# Patient Record
Sex: FEMALE | Race: WHITE | NOT HISPANIC OR LATINO | Employment: OTHER | URBAN - METROPOLITAN AREA
[De-identification: names, ages, dates, MRNs, and addresses within clinical notes are randomized per-mention and may not be internally consistent; named-entity substitution may affect disease eponyms.]

---

## 2017-01-10 DIAGNOSIS — G47.00 INSOMNIA, UNSPECIFIED TYPE: ICD-10-CM

## 2017-01-11 RX ORDER — ZOLPIDEM TARTRATE 10 MG/1
TABLET ORAL
Qty: 30 TAB | Refills: 3 | Status: SHIPPED
Start: 2017-01-11 | End: 2017-01-12 | Stop reason: SDUPTHER

## 2017-01-12 DIAGNOSIS — G47.00 INSOMNIA, UNSPECIFIED TYPE: ICD-10-CM

## 2017-01-12 RX ORDER — ZOLPIDEM TARTRATE 10 MG/1
TABLET ORAL
Qty: 30 TAB | Refills: 3 | Status: SHIPPED | OUTPATIENT
Start: 2017-01-12 | End: 2017-04-13 | Stop reason: SDUPTHER

## 2017-01-12 NOTE — TELEPHONE ENCOUNTER
Caller Name: Isatu Dang                 Call Back Number: [phone] (home)         Patient approves a detailed voicemail message: N\A    Have we ever prescribed this med? Yes.  If yes, what date? 7/26/2016    Last OV: 7/26/2016 lui     Next OV: 3/13/2017 lui    DX: insomnia    Medications:  Current Outpatient Prescriptions   Medication Sig Dispense Refill   • Cyanocobalamin (VITAMIN B-12 SL) Place  under tongue every day.     • citalopram (CELEXA) 20 MG Tab TAKE ONE TABLET BY MOUTH ONCE DAILY 90 Tab 3   • zolpidem (AMBIEN) 10 MG Tab Take 1 Tab by mouth at bedtime as needed for Sleep. 30 Tab 3   • potassium chloride SA (K-DUR) 10 MEQ Tab CR Take 1 Tab by mouth every day. 90 Tab 3   • furosemide (LASIX) 40 MG Tab Take 1 Tab by mouth 2 Times a Day. 60 Tab 11   • carvedilol (COREG) 25 MG Tab Take 1 Tab by mouth 2 times a day, with meals. 180 Tab 3   • losartan (COZAAR) 50 MG Tab Take 1 Tab by mouth every day. 90 Tab 3   • metformin (GLUCOPHAGE) 1000 MG tablet Take 1,000 mg by mouth 2 times a day, with meals.     • digoxin (LANOXIN) 125 MCG Tab Take 1 Tab by mouth every day. 90 Tab 3   • aspirin (ASA) 325 MG TABS Take 325 mg by mouth every day.     • Multiple Vitamin (MULTI-VITAMINS) TABS Take 1 Tab by mouth every day.     • Omega-3 Fatty Acids (FISH OIL) 300 MG CAPS Take 1 Tab by mouth every day.     • Calcium Carbonate-Vitamin D (CALCIUM 600+D) 600-200 MG-UNIT TABS Take 2 Tabs by mouth every day.     • allopurinol (ZYLOPRIM) 300 MG TABS Take 300 mg by mouth 2 times a day.     • gabapentin (NEURONTIN) 300 MG CAPS Take 600 mg by mouth 3 times a day.       No current facility-administered medications for this visit.

## 2017-01-12 NOTE — TELEPHONE ENCOUNTER
Caller Name: Isatu Dang                 Call Back Number: [phone] (home)         Patient approves a detailed voicemail message: N\A    Have we ever prescribed this med? Yes.  If yes, what date? 1/11/17    Last OV: 11/4/16    Next OV: 3/13/17    DX: insomnia     Medications:  Current Outpatient Prescriptions   Medication Sig Dispense Refill   • zolpidem (AMBIEN) 10 MG Tab TAKE ONE TABLET BY MOUTH AT BEDTIME 30 Tab 3   • Cyanocobalamin (VITAMIN B-12 SL) Place  under tongue every day.     • citalopram (CELEXA) 20 MG Tab TAKE ONE TABLET BY MOUTH ONCE DAILY 90 Tab 3   • potassium chloride SA (K-DUR) 10 MEQ Tab CR Take 1 Tab by mouth every day. 90 Tab 3   • furosemide (LASIX) 40 MG Tab Take 1 Tab by mouth 2 Times a Day. 60 Tab 11   • carvedilol (COREG) 25 MG Tab Take 1 Tab by mouth 2 times a day, with meals. 180 Tab 3   • losartan (COZAAR) 50 MG Tab Take 1 Tab by mouth every day. 90 Tab 3   • metformin (GLUCOPHAGE) 1000 MG tablet Take 1,000 mg by mouth 2 times a day, with meals.     • digoxin (LANOXIN) 125 MCG Tab Take 1 Tab by mouth every day. 90 Tab 3   • aspirin (ASA) 325 MG TABS Take 325 mg by mouth every day.     • Multiple Vitamin (MULTI-VITAMINS) TABS Take 1 Tab by mouth every day.     • Omega-3 Fatty Acids (FISH OIL) 300 MG CAPS Take 1 Tab by mouth every day.     • Calcium Carbonate-Vitamin D (CALCIUM 600+D) 600-200 MG-UNIT TABS Take 2 Tabs by mouth every day.     • allopurinol (ZYLOPRIM) 300 MG TABS Take 300 mg by mouth 2 times a day.     • gabapentin (NEURONTIN) 300 MG CAPS Take 600 mg by mouth 3 times a day.       No current facility-administered medications for this visit.

## 2017-01-13 NOTE — TELEPHONE ENCOUNTER
[facility] [address] [place] [address] - [address] [place] [id number]  Phone: [phone] Fax: [phone]    Called rx into pharmacy.

## 2017-01-13 NOTE — TELEPHONE ENCOUNTER
Faxed Ambien to [facility] on [address]. Spoke with pt and let her no I received confirmation

## 2017-02-06 ENCOUNTER — HOSPITAL ENCOUNTER (OUTPATIENT)
Dept: LAB | Facility: MEDICAL CENTER | Age: 67
End: 2017-02-06
Attending: PSYCHIATRY & NEUROLOGY
Payer: MEDICARE

## 2017-02-06 ENCOUNTER — HOSPITAL ENCOUNTER (OUTPATIENT)
Dept: LAB | Facility: MEDICAL CENTER | Age: 67
End: 2017-02-06
Attending: INTERNAL MEDICINE
Payer: MEDICARE

## 2017-02-06 LAB
25(OH)D3 SERPL-MCNC: 46 NG/ML (ref 30–100)
ALBUMIN SERPL BCP-MCNC: 4.4 G/DL (ref 3.2–4.9)
ALBUMIN/GLOB SERPL: 1.8 G/DL
ALP SERPL-CCNC: 60 U/L (ref 30–99)
ALT SERPL-CCNC: 19 U/L (ref 2–50)
ANION GAP SERPL CALC-SCNC: 9 MMOL/L (ref 0–11.9)
AST SERPL-CCNC: 32 U/L (ref 12–45)
BASOPHILS # BLD AUTO: 0.09 K/UL (ref 0–0.12)
BASOPHILS NFR BLD AUTO: 1.1 % (ref 0–1.8)
BILIRUB SERPL-MCNC: 0.6 MG/DL (ref 0.1–1.5)
BUN SERPL-MCNC: 13 MG/DL (ref 8–22)
CALCIUM SERPL-MCNC: 9.7 MG/DL (ref 8.5–10.5)
CHLORIDE SERPL-SCNC: 101 MMOL/L (ref 96–112)
CO2 SERPL-SCNC: 32 MMOL/L (ref 20–33)
CREAT SERPL-MCNC: 0.59 MG/DL (ref 0.5–1.4)
EOSINOPHIL # BLD: 1.01 K/UL (ref 0–0.51)
EOSINOPHIL NFR BLD AUTO: 12.5 % (ref 0–6.9)
ERYTHROCYTE [DISTWIDTH] IN BLOOD BY AUTOMATED COUNT: 45.5 FL (ref 35.9–50)
FOLATE SERPL-MCNC: 19.9 NG/ML
GLOBULIN SER CALC-MCNC: 2.4 G/DL (ref 1.9–3.5)
GLUCOSE SERPL-MCNC: 130 MG/DL (ref 65–99)
HCT VFR BLD AUTO: 41.8 % (ref 37–47)
HGB BLD-MCNC: 14.5 G/DL (ref 12–16)
IMM GRANULOCYTES # BLD AUTO: 0.03 K/UL (ref 0–0.11)
IMM GRANULOCYTES NFR BLD AUTO: 0.4 % (ref 0–0.9)
LDH SERPL L TO P-CCNC: 168 U/L (ref 107–266)
LYMPHOCYTES # BLD: 1.58 K/UL (ref 1–4.8)
LYMPHOCYTES NFR BLD AUTO: 19.6 % (ref 22–41)
MCH RBC QN AUTO: 32.3 PG (ref 27–33)
MCHC RBC AUTO-ENTMCNC: 34.7 G/DL (ref 33.6–35)
MCV RBC AUTO: 93.1 FL (ref 81.4–97.8)
MONOCYTES # BLD: 0.49 K/UL (ref 0–0.85)
MONOCYTES NFR BLD AUTO: 6.1 % (ref 0–13.4)
NEUTROPHILS # BLD: 4.86 K/UL (ref 2–7.15)
NEUTROPHILS NFR BLD AUTO: 60.3 % (ref 44–72)
NRBC # BLD AUTO: 0 K/UL
NRBC BLD-RTO: 0 /100 WBC
PLATELET # BLD AUTO: 201 K/UL (ref 164–446)
PMV BLD AUTO: 10.7 FL (ref 9–12.9)
POTASSIUM SERPL-SCNC: 4.3 MMOL/L (ref 3.6–5.5)
PROT SERPL-MCNC: 6.8 G/DL (ref 6–8.2)
RBC # BLD AUTO: 4.49 M/UL (ref 4.2–5.4)
SODIUM SERPL-SCNC: 142 MMOL/L (ref 135–145)
T3 SERPL-MCNC: 100.5 NG/DL (ref 60–181)
T4 SERPL-MCNC: 9.1 UG/DL (ref 4–12)
TSH SERPL DL<=0.005 MIU/L-ACNC: 1.2 UIU/ML (ref 0.3–3.7)
VIT B12 SERPL-MCNC: 601 PG/ML (ref 211–911)
WBC # BLD AUTO: 8.1 K/UL (ref 4.8–10.8)

## 2017-02-06 PROCEDURE — 84443 ASSAY THYROID STIM HORMONE: CPT

## 2017-02-06 PROCEDURE — 84436 ASSAY OF TOTAL THYROXINE: CPT

## 2017-02-06 PROCEDURE — 84480 ASSAY TRIIODOTHYRONINE (T3): CPT

## 2017-02-06 PROCEDURE — 82306 VITAMIN D 25 HYDROXY: CPT

## 2017-02-06 PROCEDURE — 82746 ASSAY OF FOLIC ACID SERUM: CPT

## 2017-02-06 PROCEDURE — 82607 VITAMIN B-12: CPT

## 2017-02-27 DIAGNOSIS — I47.20 VT (VENTRICULAR TACHYCARDIA) (HCC): ICD-10-CM

## 2017-02-28 RX ORDER — DIGOXIN 125 MCG
TABLET ORAL
Qty: 90 TAB | Refills: 3 | Status: SHIPPED | OUTPATIENT
Start: 2017-02-28 | End: 2017-04-18 | Stop reason: SDUPTHER

## 2017-03-03 ENCOUNTER — NON-PROVIDER VISIT (OUTPATIENT)
Dept: PULMONOLOGY | Facility: HOSPICE | Age: 67
End: 2017-03-03
Payer: MEDICARE

## 2017-03-03 DIAGNOSIS — J98.4 RESTRICTIVE LUNG DISEASE: ICD-10-CM

## 2017-03-03 PROCEDURE — 94726 PLETHYSMOGRAPHY LUNG VOLUMES: CPT | Performed by: INTERNAL MEDICINE

## 2017-03-03 PROCEDURE — 94060 EVALUATION OF WHEEZING: CPT | Performed by: INTERNAL MEDICINE

## 2017-03-03 PROCEDURE — 94729 DIFFUSING CAPACITY: CPT | Performed by: INTERNAL MEDICINE

## 2017-03-03 ASSESSMENT — PULMONARY FUNCTION TESTS
FEV1_PERCENT_PREDICTED: 46
FVC_PERCENT_PREDICTED: 55
FEV1_PERCENT_PREDICTED: 50
FEV1_PREDICTED: 2.04
FVC_PREDICTED: 2.7
FVC: 1.45
FEV1/FVC_PERCENT_PREDICTED: 87
FEV1/FVC: 66
FEV1/FVC_PERCENT_PREDICTED: 91
FVC_PERCENT_PREDICTED: 53
FEV1: 1.04
FEV1: .96
FVC: 1.49
FEV1_PERCENT_CHANGE: 3
FEV1/FVC_PERCENT_PREDICTED: 76
FEV1/FVC: 69.8
FEV1/FVC_PERCENT_CHANGE: 267
FEV1_PERCENT_CHANGE: 8

## 2017-03-03 NOTE — PROCEDURES
Technician: Lg Gauthier, RRT/CPFT  Technician Comments:  Good patient effort & cooperation.  The results of this test meet the ATS/ERS standards for acceptability and repeatability.  Test was performed on the [employer] Body Plethysmograph- Elite DX system.  Predicted equations for Spirometry are NHanes, DLCO- [facility] & Lung volumes- [facility].  The DLCO was uncorrected for Hgb.  A bronchodilator of Ventolin HFA- 2puffs via spacer administered.  SPIROMETRY:  1. FVC was 1.45L, 53 % of predicted  2. FEV1 was 0.96L, 46 % of predicted   3. FEV1/FVC ratio was 69 %  4. There was no significant response to bronchodilators   5. Flow volume loop small and school but consistent with mixed ventilatory defect    LUNG VOLUMES:  1. TLC was 74 % of predicted   2. RV was  90 % of predicted     DIFFUSION CAPACITY:  1.Defusion capacity was  89 % of predicted       IMPRESSION:  The patient has severe decrease in FVC and FEV1 with a reduced FEV1/FVC ratio. Her total lung capacity was also mildly decreased. These findings are consistent with mixed restrictive and obstructive ventilatory defect. Clinical correlation is required.

## 2017-03-03 NOTE — MR AVS SNAPSHOT
Isatu Dang   3/3/2017 8:00 AM   Non-Provider Visit   MRN: [id number]    Department:  [facility]   Dept Phone:  [phone]    Description:  Female : 1950   Provider:  Catie Santana M.D.           Reason for Visit     Shortness of Breath           Allergies as of 3/3/2017     Allergen Noted Reactions    Ace Inhibitors 10/20/2011       cough    Tape 2011   Contact Dermatitis      You were diagnosed with     Restrictive lung disease   [627603]         Vital Signs     Smoking Status                   Former Smoker           Basic Information     Date Of Birth Sex Race Ethnicity Preferred Language    1950 Female White Non- English      Your appointments     Mar 13, 2017 10:00 AM   Follow UP with JEAN CLAUDE Coe   [facility] (--)    [address]  [place] [address]   [phone]              Problem List              ICD-10-CM Priority Class Noted - Resolved    Congestive heart failure (CHF) (CMS-HCC) I50.9   Unknown - Present    Depression F32.9   Unknown - Present    Type 2 diabetes mellitus without complication (CMS-HCC) E11.9   Unknown - Present    Dilated cardiomyopathy (CMS-HCC) I42.0   Unknown - Present    Hypercholesteremia E78.00   Unknown - Present    LBBB (left bundle branch block) I44.7   Unknown - Present    Obesity E66.9   Unknown - Present    Peripheral neuropathy (CMS-HCC) G62.9   Unknown - Present    Renal cyst, acquired, left N28.1   Unknown - Present    Ventricular tachycardia (CMS-HCC) I47.2   Unknown - Present    Presence of biventricular AICD Z95.810 Medium  2011 - Present    Lymphoma (CMS-HCC) C85.90   2012 - Present    Mediastinal mass J98.59   2012 - Present    Insomnia G47.00   2013 - Present    Obstructive sleep apnea G47.33 Low  2013 - Present    Lung cancer (CMS-HCC) C34.90   2013 - Present    Acute systolic heart failure (CMS-HCC) I50.21 High  2016 -  Present    Type 2 diabetes mellitus with diabetic polyneuropathy (CMS-HCC) E11.42 Low  1/31/2016 - Present    Essential hypertension I10 Medium  1/31/2016 - Present    Aortic regurgitation I35.1   5/27/2016 - Present    Nocturnal hypoxemia G47.34   7/26/2016 - Present    Restrictive lung disease J98.4   7/26/2016 - Present      Health Maintenance        Date Due Completion Dates    DIABETES MONOFILAMENT / LE EXAM 5/15/1949 ---    RETINAL SCREENING 11/15/1966 ---    IMM DTaP/Tdap/Td Vaccine (1 - Tdap) 11/15/1967 ---    PAP SMEAR 11/15/1969 ---    COLONOSCOPY 11/15/1998 ---    IMM ZOSTER VACCINE 11/15/2008 ---    IMM PNEUMOCOCCAL 65+ (ADULT) HIGH/HIGHEST RISK SERIES (1 of 2 - PCV13) 11/15/2013 ---    A1C SCREENING 7/18/2016 1/18/2016, 11/24/2015, 8/24/2015, 3/26/2014, 6/14/2011    IMM INFLUENZA (1) 9/1/2016 10/1/2015, 9/14/2011    FASTING LIPID PROFILE 1/18/2017 1/18/2016, 8/24/2015, 3/26/2014, 2/24/2012, 9/12/2011, 6/14/2011    URINE ACR / MICROALBUMIN 1/18/2017 1/18/2016, 11/24/2015    BONE DENSITY 3/5/2017 3/5/2012    MAMMOGRAM 10/26/2017 10/26/2016, 9/4/2015, 3/28/2014, 3/24/2014, 3/22/2013, 3/5/2012, 11/5/2010, 11/17/2008, 11/17/2008, 6/17/2008, 6/17/2008, 11/26/2007, 10/18/2007, 10/18/2007, 5/12/2006, 8/5/2004    SERUM CREATININE 2/6/2018 2/6/2017, 11/9/2016, 7/12/2016, 5/24/2016, 1/30/2016, 1/30/2016, 1/18/2016, 8/24/2015, 7/27/2015, 5/11/2015, 11/14/2014, 10/6/2014, 3/26/2014, 11/26/2013, 11/12/2013, 6/6/2013, 3/5/2013, 3/5/2013, 11/14/2012, 8/28/2012, 8/13/2012, 6/14/2012, 5/22/2012, 3/23/2012, 2/24/2012, 10/20/2011, 9/13/2011, 9/11/2011, 6/14/2011, 3/25/2008, 5/21/2007            Current Immunizations     Influenza TIV (IM) 10/1/2015, 9/14/2011 11:27 AM    Pneumococcal Vaccine (PCV7) Historical Data 10/1/2015    Pneumococcal Vaccine (UF)Historical Data 1/12/2009      Below and/or attached are the medications your provider expects you to take. Review all of your home medications and newly ordered medications  with your provider and/or pharmacist. Follow medication instructions as directed by your provider and/or pharmacist. Please keep your medication list with you and share with your provider. Update the information when medications are discontinued, doses are changed, or new medications (including over-the-counter products) are added; and carry medication information at all times in the event of emergency situations     Allergies:  ACE INHIBITORS - (reactions not documented)     TAPE - Contact Dermatitis               Medications  Valid as of: March 03, 2017 -  9:07 AM    Generic Name Brand Name Tablet Size Instructions for use    Allopurinol (Tab) ZYLOPRIM 300 MG Take 300 mg by mouth 2 times a day.        Aspirin (Tab)  MG Take 325 mg by mouth every day.        Calcium Carbonate-Vitamin D (Tab) Calcium Carbonate-Vitamin D 600-200 MG-UNIT Take 2 Tabs by mouth every day.        Carvedilol (Tab) COREG 25 MG Take 1 Tab by mouth 2 times a day, with meals.        Citalopram Hydrobromide (Tab) CELEXA 20 MG TAKE ONE TABLET BY MOUTH ONCE DAILY        Cyanocobalamin   Place  under tongue every day.        Digoxin (Tab) LANOXIN 125 MCG TAKE ONE TABLET BY MOUTH ONCE DAILY        Furosemide (Tab) LASIX 40 MG Take 1 Tab by mouth 2 Times a Day.        Gabapentin (Cap) NEURONTIN 300 MG Take 600 mg by mouth 3 times a day.        Losartan Potassium (Tab) COZAAR 50 MG Take 1 Tab by mouth every day.        MetFORMIN HCl (Tab) GLUCOPHAGE 1000 MG Take 1,000 mg by mouth 2 times a day, with meals.        Multiple Vitamin (Tab) MULTI-VITAMINS  Take 1 Tab by mouth every day.        Omega-3 Fatty Acids (Cap) Fish Oil 300 MG Take 1 Tab by mouth every day.        Potassium Chloride Crystal CR (Tab CR) K-DUR 10 MEQ Take 1 Tab by mouth every day.        Zolpidem Tartrate (Tab) AMBIEN 10 MG TAKE ONE TABLET BY MOUTH AT BEDTIME        .                 Medicines prescribed today were sent to:     [facility] - [place], [place] - [address]  [address] [address]    Phone: [phone] Fax: [phone]    Open 24 Hours?: No      Medication refill instructions:       If your prescription bottle indicates you have medication refills left, it is not necessary to call your provider’s office. Please contact your pharmacy and they will refill your medication.    If your prescription bottle indicates you do not have any refills left, you may request refills at any time through one of the following ways: The online [email or web address] system (except Urgent Care), by calling your provider’s office, or by asking your pharmacy to contact your provider’s office with a refill request. Medication refills are processed only during regular business hours and may not be available until the next business day. Your provider may request additional information or to have a follow-up visit with you prior to refilling your medication.   *Please Note: Medication refills are assigned a new Rx number when refilled electronically. Your pharmacy may indicate that no refills were authorized even though a new prescription for the same medication is available at the pharmacy. Please request the medicine by name with the pharmacy before contacting your provider for a refill.           [email or web address] Access Code: [email or web address]  Expires: 3/8/2017  7:37 AM    PresseTrends.com  A secure, online tool to manage your health information     [employer]’s PresseTrends.com® is a secure, online tool that connects you to your personalized health information from the privacy of your home -- day or night - making it very easy for you to manage your healthcare. Once the activation process is completed, you can even access your medical information using the PresseTrends.com cristobal, which is available for free in the Apple Cristobal store or Google Play store.     PresseTrends.com provides the following levels of access (as shown below):   My Chart Features   [facility] Primary Care Doctor [facility]  Specialists [facility]  Urgent  Care  [facility]  Primary [facility]  Doctor   Email your healthcare team securely and privately 24/7 X X X    Manage appointments: schedule your next appointment; view details of past/upcoming appointments X      Request prescription refills. X      View recent personal medical records, including lab and immunizations X X X X   View health record, including health history, allergies, medications X X X X   Read reports about your outpatient visits, procedures, consult and ER notes X X X X   See your discharge summary, which is a recap of your hospital and/or ER visit that includes your diagnosis, lab results, and care plan. X X       How to register for [employer]:  1. Go to  [email or web address].  2. Click on the Sign Up Now box, which takes you to the New Member Sign Up page. You will need to provide the following information:  a. Enter your [employer] Access Code exactly as it appears at the top of this page. (You will not need to use this code after you’ve completed the sign-up process. If you do not sign up before the expiration date, you must request a new code.)   b. Enter your date of birth.   c. Enter your home email address.   d. Click Submit, and follow the next screen’s instructions.  3. Create a Cinchcast ID. This will be your [employer] login ID and cannot be changed, so think of one that is secure and easy to remember.  4. Create a [employer] password. You can change your password at any time.  5. Enter your Password Reset Question and Answer. This can be used at a later time if you forget your password.   6. Enter your e-mail address. This allows you to receive e-mail notifications when new information is available in [employer].  7. Click Sign Up. You can now view your health information.    For assistance activating your [employer] account, call [phone]

## 2017-03-13 ENCOUNTER — SLEEP CENTER VISIT (OUTPATIENT)
Dept: SLEEP MEDICINE | Facility: MEDICAL CENTER | Age: 67
End: 2017-03-13
Payer: MEDICARE

## 2017-03-13 VITALS
BODY MASS INDEX: 30.55 KG/M2 | WEIGHT: 166 LBS | OXYGEN SATURATION: 94 % | SYSTOLIC BLOOD PRESSURE: 116 MMHG | TEMPERATURE: 97.6 F | HEIGHT: 62 IN | HEART RATE: 78 BPM | RESPIRATION RATE: 16 BRPM | DIASTOLIC BLOOD PRESSURE: 64 MMHG

## 2017-03-13 DIAGNOSIS — J98.59 MEDIASTINAL MASS: ICD-10-CM

## 2017-03-13 DIAGNOSIS — C82.80 OTHER TYPE OF FOLLICULAR LYMPHOMA: ICD-10-CM

## 2017-03-13 DIAGNOSIS — Z85.118 H/O: LUNG CANCER: ICD-10-CM

## 2017-03-13 DIAGNOSIS — G47.33 OBSTRUCTIVE SLEEP APNEA: ICD-10-CM

## 2017-03-13 DIAGNOSIS — J98.4 RESTRICTIVE LUNG DISEASE: ICD-10-CM

## 2017-03-13 DIAGNOSIS — G47.34 NOCTURNAL HYPOXEMIA: ICD-10-CM

## 2017-03-13 PROCEDURE — 99214 OFFICE O/P EST MOD 30 MIN: CPT | Performed by: NURSE PRACTITIONER

## 2017-03-13 RX ORDER — ALBUTEROL SULFATE 90 UG/1
2 AEROSOL, METERED RESPIRATORY (INHALATION) EVERY 4 HOURS PRN
Qty: 1 INHALER | Refills: 6 | Status: SHIPPED | OUTPATIENT
Start: 2017-03-13 | End: 2017-09-29

## 2017-03-13 NOTE — PATIENT INSTRUCTIONS
Plan:    1) Continue CPAP at 11 CM H20 with 2 LPM 02 bleed in.   2) Sleep hygiene discussed. Continue prn Ambien qhs.   3) Reviewed PFT's in detail. She has had a decline in her FEV1. Her DLCO has improved. She does not dyspnea with exertion. Trial of Spiriva 2.5 mcg respimat 2 puffs INH daily along with an Albuterol HFA inhaler 2 puffs q 4 hours as needed. Encouraged routine walking/exercise.   4) Continue to follow with Cardiology.  5) Continue to follow with Oncology. Request consult records from Dr. Rodríguez.   6) Scheduled repeat PFT's in 6 months give decline in FEV1.  7) 3 month follow up at the Pulmonary office, sooner if needed.

## 2017-03-13 NOTE — MR AVS SNAPSHOT
"Isatu Dang   3/13/2017 10:00 AM   Sleep Center Visit   MRN: [id number]    Department:  [facility]   Dept Phone:  [phone]    Description:  Female : 1950   Provider:  JEAN CLAUDE Coe           Reason for Visit     Apnea CPAP 11      Allergies as of 3/13/2017     Allergen Noted Reactions    Ace Inhibitors 10/20/2011       cough    Tape 2011   Contact Dermatitis      You were diagnosed with     Obstructive sleep apnea   [769217]       Nocturnal hypoxemia   [236142]       Mediastinal mass   [516729]       H/O: lung cancer   [484501]       Other type of follicular lymphoma (CMS-HCC)   [1418616]       Restrictive lung disease   [665053]         Vital Signs     Blood Pressure Pulse Temperature Respirations Height Weight    116/64 mmHg 78 36.4 °C (97.6 °F) 16 1.575 m (5' 2\") 75.297 kg (166 lb)    Body Mass Index Oxygen Saturation Smoking Status             30.35 kg/m2 94% Former Smoker         Basic Information     Date Of Birth Sex Race Ethnicity Preferred Language    1950 Female White Non- English      Your appointments     2017  9:00 AM   Follow UP with JEAN CLAUDE Coe   [facility] (--)    [address] [facility]  [place] [id number] [phone]              Problem List              ICD-10-CM Priority Class Noted - Resolved    Congestive heart failure (CHF) (CMS-HCC) I50.9   Unknown - Present    Depression F32.9   Unknown - Present    Type 2 diabetes mellitus without complication (CMS-HCC) E11.9   Unknown - Present    Dilated cardiomyopathy (CMS-HCC) I42.0   Unknown - Present    Hypercholesteremia E78.00   Unknown - Present    LBBB (left bundle branch block) I44.7   Unknown - Present    Obesity E66.9   Unknown - Present    Peripheral neuropathy (CMS-HCC) G62.9   Unknown - Present    Renal cyst, acquired, left N28.1   Unknown - Present    Ventricular tachycardia (CMS-HCC) I47.2   Unknown - Present   " Presence of biventricular AICD Z95.810 Medium  12/20/2011 - Present    Lymphoma (CMS-HCC) C85.90   8/17/2012 - Present    Mediastinal mass J98.59   8/17/2012 - Present    Insomnia G47.00   4/17/2013 - Present    Obstructive sleep apnea G47.33 Low  4/17/2013 - Present    Lung cancer (CMS-HCC) C34.90   7/16/2013 - Present    Acute systolic heart failure (CMS-HCC) I50.21 High  1/30/2016 - Present    Type 2 diabetes mellitus with diabetic polyneuropathy (CMS-HCC) E11.42 Low  1/31/2016 - Present    Essential hypertension I10 Medium  1/31/2016 - Present    Aortic regurgitation I35.1   5/27/2016 - Present    Nocturnal hypoxemia G47.34   7/26/2016 - Present    Restrictive lung disease J98.4   7/26/2016 - Present      Health Maintenance        Date Due Completion Dates    DIABETES MONOFILAMENT / LE EXAM 5/15/1949 ---    RETINAL SCREENING 11/15/1966 ---    IMM DTaP/Tdap/Td Vaccine (1 - Tdap) 11/15/1967 ---    PAP SMEAR 11/15/1969 ---    COLONOSCOPY 11/15/1998 ---    IMM ZOSTER VACCINE 11/15/2008 ---    IMM PNEUMOCOCCAL 65+ (ADULT) HIGH/HIGHEST RISK SERIES (1 of 2 - PCV13) 11/15/2013 ---    A1C SCREENING 7/18/2016 1/18/2016, 11/24/2015, 8/24/2015, 3/26/2014, 6/14/2011    IMM INFLUENZA (1) 9/1/2016 10/1/2015, 9/14/2011    FASTING LIPID PROFILE 1/18/2017 1/18/2016, 8/24/2015, 3/26/2014, 2/24/2012, 9/12/2011, 6/14/2011    URINE ACR / MICROALBUMIN 1/18/2017 1/18/2016, 11/24/2015    BONE DENSITY 3/5/2017 3/5/2012    MAMMOGRAM 10/26/2017 10/26/2016, 9/4/2015, 3/28/2014, 3/24/2014, 3/22/2013, 3/5/2012, 11/5/2010, 11/17/2008, 11/17/2008, 6/17/2008, 6/17/2008, 11/26/2007, 10/18/2007, 10/18/2007, 5/12/2006, 8/5/2004    SERUM CREATININE 2/6/2018 2/6/2017, 11/9/2016, 7/12/2016, 5/24/2016, 1/30/2016, 1/30/2016, 1/18/2016, 8/24/2015, 7/27/2015, 5/11/2015, 11/14/2014, 10/6/2014, 3/26/2014, 11/26/2013, 11/12/2013, 6/6/2013, 3/5/2013, 3/5/2013, 11/14/2012, 8/28/2012, 8/13/2012, 6/14/2012, 5/22/2012, 3/23/2012, 2/24/2012, 10/20/2011, 9/13/2011,  9/11/2011, 6/14/2011, 3/25/2008, 5/21/2007            Current Immunizations     Influenza TIV (IM) 10/1/2015, 9/14/2011 11:27 AM    Pneumococcal Vaccine (PCV7) Historical Data 10/1/2015    Pneumococcal Vaccine (UF)Historical Data 1/12/2009      Below and/or attached are the medications your provider expects you to take. Review all of your home medications and newly ordered medications with your provider and/or pharmacist. Follow medication instructions as directed by your provider and/or pharmacist. Please keep your medication list with you and share with your provider. Update the information when medications are discontinued, doses are changed, or new medications (including over-the-counter products) are added; and carry medication information at all times in the event of emergency situations     Allergies:  ACE INHIBITORS - (reactions not documented)     TAPE - Contact Dermatitis               Medications  Valid as of: March 13, 2017 - 10:56 AM    Generic Name Brand Name Tablet Size Instructions for use    Albuterol Sulfate (Aero Soln) albuterol 108 (90 BASE) MCG/ACT Inhale 2 Puffs by mouth every four hours as needed for Shortness of Breath (Wheezing).        Allopurinol (Tab) ZYLOPRIM 300 MG Take 300 mg by mouth 2 times a day.        Aspirin (Tab)  MG Take 325 mg by mouth every day.        Calcium Carbonate-Vitamin D (Tab) Calcium Carbonate-Vitamin D 600-200 MG-UNIT Take 2 Tabs by mouth every day.        Carvedilol (Tab) COREG 25 MG Take 1 Tab by mouth 2 times a day, with meals.        Citalopram Hydrobromide (Tab) CELEXA 20 MG TAKE ONE TABLET BY MOUTH ONCE DAILY        Cyanocobalamin   Place  under tongue every day.        Digoxin (Tab) LANOXIN 125 MCG TAKE ONE TABLET BY MOUTH ONCE DAILY        Furosemide (Tab) LASIX 40 MG Take 1 Tab by mouth 2 Times a Day.        Gabapentin (Cap) NEURONTIN 300 MG Take 600 mg by mouth 3 times a day.        Losartan Potassium (Tab) COZAAR 50 MG Take 1 Tab by mouth every  day.        MetFORMIN HCl (Tab) GLUCOPHAGE 1000 MG Take 1,000 mg by mouth 2 times a day, with meals.        Multiple Vitamin (Tab) MULTI-VITAMINS  Take 1 Tab by mouth every day.        Omega-3 Fatty Acids (Cap) Fish Oil 300 MG Take 1 Tab by mouth every day.        Potassium Chloride Crystal CR (Tab CR) K-DUR 10 MEQ Take 1 Tab by mouth every day.        Tiotropium Bromide Monohydrate (Aero Soln) Tiotropium Bromide Monohydrate 2.5 MCG/ACT Inhale 2 Inhalation by mouth every day. Assemble and prime.        Zolpidem Tartrate (Tab) AMBIEN 10 MG TAKE ONE TABLET BY MOUTH AT BEDTIME        .                 Medicines prescribed today were sent to:     [facility] [address] [place] [address] [place] - [address] [place] [address]    Phone: [phone] Fax: [phone]    Open 24 Hours?: No      Medication refill instructions:       If your prescription bottle indicates you have medication refills left, it is not necessary to call your provider’s office. Please contact your pharmacy and they will refill your medication.    If your prescription bottle indicates you do not have any refills left, you may request refills at any time through one of the following ways: The online Triggit system (except Urgent Care), by calling your provider’s office, or by asking your pharmacy to contact your provider’s office with a refill request. Medication refills are processed only during regular business hours and may not be available until the next business day. Your provider may request additional information or to have a follow-up visit with you prior to refilling your medication.   *Please Note: Medication refills are assigned a new Rx number when refilled electronically. Your pharmacy may indicate that no refills were authorized even though a new prescription for the same medication is available at the pharmacy. Please request the medicine by name with the pharmacy before contacting your provider for a refill.           Your To Do List     Future Labs/Procedures Complete By Expires    AMB PULMONARY FUNCTION TEST/LAB  9/13/2017 (Approximate) 3/13/2018    Comments:    In 6 months      Instructions    Plan:    1) Continue CPAP at 11 CM H20 with 2 LPM 02 bleed in.   2) Sleep hygiene discussed. Continue prn Ambien qhs.   3) Reviewed PFT's in detail. She has had a decline in her FEV1. Her DLCO has improved. She does not dyspnea with exertion. Trial of Spiriva 2.5 mcg respimat 2 puffs INH daily along with an Albuterol HFA inhaler 2 puffs q 4 hours as needed. Encouraged routine walking/exercise.   4) Continue to follow with Cardiology.  5) Continue to follow with Oncology. Request consult records from Dr. Rodríguez.   6) Scheduled repeat PFT's in 6 months give decline in FEV1.  7) 3 month follow up at the Pulmonary office, sooner if needed.           FlexGen Access Code: [email or web address]  Expires: 4/12/2017 10:56 AM    FlexGen  A secure, online tool to manage your health information     [employer]’s FlexGen® is a secure, online tool that connects you to your personalized health information from the privacy of your home -- day or night - making it very easy for you to manage your healthcare. Once the activation process is completed, you can even access your medical information using the FlexGen cristobal, which is available for free in the Apple Cristobal store or Google Play store.     FlexGen provides the following levels of access (as shown below):   My Chart Features   [facility] Doctor [facility]  Specialists [facility]  Urgent  Care Non-[facility]  Primary Care  Doctor   Email your healthcare team securely and privately 24/7 X X X    Manage appointments: schedule your next appointment; view details of past/upcoming appointments X      Request prescription refills. X      View recent personal medical records, including lab and immunizations X X X X   View health record, including health history, allergies, medications X X X X   Read reports about  your outpatient visits, procedures, consult and ER notes X X X X   See your discharge summary, which is a recap of your hospital and/or ER visit that includes your diagnosis, lab results, and care plan. X X       How to register for etaskr:  1. Go to  [email or web address].  2. Click on the Sign Up Now box, which takes you to the New Member Sign Up page. You will need to provide the following information:  a. Enter your etaskr Access Code exactly as it appears at the top of this page. (You will not need to use this code after you’ve completed the sign-up process. If you do not sign up before the expiration date, you must request a new code.)   b. Enter your date of birth.   c. Enter your home email address.   d. Click Submit, and follow the next screen’s instructions.  3. Create a etaskr ID. This will be your etaskr login ID and cannot be changed, so think of one that is secure and easy to remember.  4. Create a etaskr password. You can change your password at any time.  5. Enter your Password Reset Question and Answer. This can be used at a later time if you forget your password.   6. Enter your e-mail address. This allows you to receive e-mail notifications when new information is available in etaskr.  7. Click Sign Up. You can now view your health information.    For assistance activating your etaskr account, call [phone]

## 2017-03-13 NOTE — PROGRESS NOTES
Chief Complaint   Patient presents with   • Apnea     CPAP 11       HPI:  Isatu Dang is a 66 y.o. year old female here today for follow-up on her Mild ISMAEL. She is compliant on CPAP at 11 CM H20 with 2 LPM 02 bleed in. Her compliance card download indicates an AHI of 2.3 on a CPAP pressure of 11 CM with an average use of 7-8 hours at night. She tolerates her CPAP pressure well. She uses Ambien at night as needed and feels this helps her to maintain sleep. She denies any morning headaches. She does feel more refreshed on therapy.   She has a history of Follicular Lymphoma and Lung Cancer with a prior lung resection in 2012. She is followed by Oncology, Dr. Rodríguez. She had PFT's while she was in the hospital February 2016 for an exacerbation of her CHF. PFT's indicated evidence of restriction with TLC 75% which was similar to her 2013 PFT's. However, her DLCO was 64% predicted and in 2013 it was 90% predicted.   PFT's were updated 3/3/2017 and indicate an FEV1 of 0.96 L, 46% predicted with an FEV1/FVC ratio of 66 with a TLC of 74% predicted and a DLCO of 89% predicted. Her FEV1 has declined from her February 2016 PFT's in which her FEV1 was 1.32 L, 63% predicted.   She notes dyspnea with exertion. She states she has not been walking as much as she had in the past. She denies any cough or mucous. She denies any wheezing. She denies any fevers or chills.     CT chest, abdomen and pelvis 8/4/2016;  1.  Focal opacities in the right middle lobe have resolved when compared to previous exam. Small bilateral pleural-based nodules are again appreciated. The nodule in the posterior left lower lobe appears broader and flatter than it did on the previous   examination. This changes of uncertain significance.  2.  Slight increase in size of heterogeneous enhancing anterior superior mediastinal mass with current measurements of 3.2 x 3.6 cm.  3.  Hepatomegaly with fatty infiltration.  4.  Interval resolution of small  bilateral pleural effusions.      Past Medical History   Diagnosis Date   • Joint replacement          • Neuropathy    • Dilated cardiomyopathy    • Hypercholesteremia    • Obesity    • Peripheral neuropathy (CMS-HCC)    • Depression    • Unspecified hemorrhagic conditions (CMS-HCC)      from aspirin   • Renal cyst, acquired, left       History of benign cystic mass on the lower left kidney.   • Hypertension 1998     Pt states bp runs low on current meds   • Myocardial infarct (CMS-[facility]) 1998   • Congestive heart failure (CHF) (CMS-HCC)    • Arrhythmia    • Cardiac arrest (CMS-HCC) 1998         • LBBB (left bundle branch block)    • Ventricular tachycardia (CMS-[facility])    • Pain 2011     Legs pain controled on meds   • Indigestion    • Sleep apnea    • Lung cancer (CMS-HCC) 2012         • Cancer (CMS-HCC) 2012     Lymphoma to neck   • CATARACT      Beginning stages no surgery yet   • Diabetes (CMS-HCC)    • Bowel habit changes      Diarrhea.   • Dental disorder      Upper/Lower dentures.       Past Surgical History   Procedure Laterality Date   • Hysterectomy, total abdominal  1995   • Implantable cardioverter defibrillator (icd)  4/1/98, 03/2007, 10/2011   • Carpal tunnel release Right 05/07         • Carpal tunnel release  4/3/08     Performed by DEEP GREEN at SURGERY [facility] ORS   • Lipoma excision  4/3/08     Performed by DEEP GREEN at SURGERY [facility] ORS   • Recovery  10/21/2011     Performed by SURGERY, CATH-RECOVERY at SURGERY SAME DAY [facility] ORS   • Hysterectomy, total abdominal           • Tonsillectomy and adenoidectomy           • Neck mass excision  5/24/2012     Performed by LAURA RAMIRES at SURGERY SAME DAY [facility] ORS   • Bone marrow aspiration  6/22/2012     Performed by ISAIAS EVERETT at ENDOSCOPY [facility] ORS   • Bone marrow biopsy, ndl/trocar  6/22/2012     Performed by ISAIAS EVERETT at ENDOSCOPY [facility] ORS   • Recovery  7/23/2012     Performed by SURGERY,  IR-RECOVERY at SURGERY SAME DAY [facility] ORS   • Thoracoscopy robotic  8/27/2012     Performed by GANSER, JOHN H at SURGERY [facility] ORS   • Laryngoscopy  11/14/2013     Performed by Judson Willis M.D. at SURGERY SAME DAY [facility] ORS   • Biopsy general  11/14/2013     Performed by Judson Willis M.D. at SURGERY SAME DAY [facility] ORS   • Aicd battery change  November 2016     Generator replacement with Medtronic Viva S CRT-D [id number] implanted by Dr. Alexis. Original device implanted in 1998       Family History   Problem Relation Age of Onset   • Diabetes Mother    • Cancer Mother    • Diabetes Maternal Grandmother    • Diabetes Sister        Social History     Social History   • Marital Status:      Spouse Name: N/A   • Number of Children: N/A   • Years of Education: N/A     Occupational History   • Not on file.     Social History Main Topics   • Smoking status: Former Smoker -- 2.00 packs/day for 30 years     Types: Cigarettes     Quit date: 10/20/1998   • Smokeless tobacco: Never Used   • Alcohol Use: Yes      Comment: occasional    • Drug Use: No   • Sexual Activity: Not Currently     Other Topics Concern   • Not on file     Social History Narrative         ROS:  Constitutional: Denies fevers, chills, sweats, fatigue, weight loss  Eyes: Denies glasses, vision loss, pain, drainage, double vision  Ears/Nose/Mouth/Throat: Denies rhinitis, nasal congestion, ear ache, difficulty hearing, sore throat, persistent hoarseness, decayed teeth/toothache  Cardiovascular: Denies chest pain, tightness, palpitations, swelling in feet/legs, fainting, difficulty breathing when laying down  Respiratory: See HPI   GI: Denies heartburn, difficulty swallowing, nausea, vomiting, abdominal pain, diarrhea, constipation  : Denies frequent urination, painful urination  Integumentary: Denies rashes, lumps or color changes  MSK: Denies painful joints, sore muscles, and back pain.   Neurological: Denies frequent headaches,  "dizziness, weakness  Sleep: See [facility]       Current Outpatient Prescriptions on File Prior to Visit   Medication Sig Dispense Refill   • digoxin (LANOXIN) 125 MCG Tab TAKE ONE TABLET BY MOUTH ONCE DAILY 90 Tab 3   • zolpidem (AMBIEN) 10 MG Tab TAKE ONE TABLET BY MOUTH AT BEDTIME 30 Tab 3   • Cyanocobalamin (VITAMIN B-12 SL) Place  under tongue every day.     • citalopram (CELEXA) 20 MG Tab TAKE ONE TABLET BY MOUTH ONCE DAILY 90 Tab 3   • potassium chloride SA (K-DUR) 10 MEQ Tab CR Take 1 Tab by mouth every day. 90 Tab 3   • furosemide (LASIX) 40 MG Tab Take 1 Tab by mouth 2 Times a Day. 60 Tab 11   • carvedilol (COREG) 25 MG Tab Take 1 Tab by mouth 2 times a day, with meals. 180 Tab 3   • losartan (COZAAR) 50 MG Tab Take 1 Tab by mouth every day. 90 Tab 3   • metformin (GLUCOPHAGE) 1000 MG tablet Take 1,000 mg by mouth 2 times a day, with meals.     • aspirin (ASA) 325 MG TABS Take 325 mg by mouth every day.     • Multiple Vitamin (MULTI-VITAMINS) TABS Take 1 Tab by mouth every day.     • Calcium Carbonate-Vitamin D (CALCIUM 600+D) 600-200 MG-UNIT TABS Take 2 Tabs by mouth every day.     • allopurinol (ZYLOPRIM) 300 MG TABS Take 300 mg by mouth 2 times a day.     • gabapentin (NEURONTIN) 300 MG CAPS Take 600 mg by mouth 3 times a day.     • Omega-3 Fatty Acids (FISH OIL) 300 MG CAPS Take 1 Tab by mouth every day.       No current facility-administered medications on file prior to visit.     Ace inhibitors and Tape    Blood pressure 116/64, pulse 78, temperature 36.4 °C (97.6 °F), resp. rate 16, height 1.575 m (5' 2\"), weight 75.297 kg (166 lb), SpO2 94 %.  PE:   Appearance: Well developed, well nourished, no acute distress  Eyes: PERRL, EOM intact, sclera white, conjunctiva moist  Ears: no lesions or deformities  Hearing: grossly intact  Nose: no lesions or deformities  Oropharynx: tongue normal, posterior pharynx without erythema or exudate  Mallampati Classification: Class 4   Neck: supple, trachea midline, no " masses   Respiratory effort: no intercostal retractions or use of accessory muscles  Lung auscultation: no rales, rhonchi or wheezes  Heart auscultation: no murmur rub or gallop  Extremities: no cyanosis or edema  Abdomen: soft ,non tender, no masses  Gait and Station: normal  Digits and nails: no clubbing, cyanosis, petechiae or nodes.  Cranial nerves: grossly intact  Skin: no rashes, lesions or ulcers noted  Orientation: Oriented to time, person and place  Mood and affect: mood and affect appropriate, normal interaction with examiner  Judgement: Intact          Assessment:  1. Obstructive sleep apnea     2. Nocturnal hypoxemia     3. Mediastinal mass     4. H/O: lung cancer     5. Other type of follicular lymphoma (HCC)     6. Restrictive lung disease           Plan:    1) Continue CPAP at 11 CM H20 with 2 LPM 02 bleed in.   2) Sleep hygiene discussed. Continue prn Ambien qhs.   3) Reviewed PFT's in detail. She has had a decline in her FEV1. Her DLCO has improved. She does not dyspnea with exertion. Trial of Spiriva 2.5 mcg respimat 2 puffs INH daily along with an Albuterol HFA inhaler 2 puffs q 4 hours as needed. Encouraged routine walking/exercise.   4) Continue to follow with Cardiology.  5) Continue to follow with Oncology. Request consult records from Dr. Rodríguez.   6) Scheduled repeat PFT's in 6 months give decline in FEV1.  7) 3 month follow up at the Pulmonary office, sooner if needed.

## 2017-04-05 DIAGNOSIS — I10 ESSENTIAL HYPERTENSION, BENIGN: ICD-10-CM

## 2017-04-05 RX ORDER — FUROSEMIDE 40 MG/1
40 TABLET ORAL 2 TIMES DAILY
Qty: 60 TAB | Refills: 11 | Status: SHIPPED | OUTPATIENT
Start: 2017-04-05 | End: 2017-04-18 | Stop reason: SDUPTHER

## 2017-04-13 ENCOUNTER — TELEPHONE (OUTPATIENT)
Dept: PULMONOLOGY | Facility: HOSPICE | Age: 67
End: 2017-04-13

## 2017-04-13 DIAGNOSIS — G47.00 INSOMNIA, UNSPECIFIED TYPE: ICD-10-CM

## 2017-04-13 DIAGNOSIS — J98.4 RESTRICTIVE LUNG DISEASE: ICD-10-CM

## 2017-04-13 RX ORDER — ZOLPIDEM TARTRATE 10 MG/1
TABLET ORAL
Qty: 90 TAB | Refills: 1 | Status: SHIPPED
Start: 2017-04-13 | End: 2017-07-13 | Stop reason: SDUPTHER

## 2017-04-13 NOTE — TELEPHONE ENCOUNTER
Have we ever prescribed this med? Yes.  If yes, what date? 1/12/17    Last OV: 3/13/17    Next OV: 7/19/17    DX: insomnia     Medications:  Current Outpatient Prescriptions   Medication Sig Dispense Refill   • furosemide (LASIX) 40 MG Tab Take 1 Tab by mouth 2 Times a Day. 60 Tab 11   • Tiotropium Bromide Monohydrate (SPIRIVA RESPIMAT) 2.5 MCG/ACT Aero Soln Inhale 2 Inhalation by mouth every day. Assemble and prime. 1 Inhaler 6   • albuterol (VENTOLIN HFA) 108 (90 BASE) MCG/ACT Aero Soln inhalation aerosol Inhale 2 Puffs by mouth every four hours as needed for Shortness of Breath (Wheezing). 1 Inhaler 6   • digoxin (LANOXIN) 125 MCG Tab TAKE ONE TABLET BY MOUTH ONCE DAILY 90 Tab 3   • zolpidem (AMBIEN) 10 MG Tab TAKE ONE TABLET BY MOUTH AT BEDTIME 30 Tab 3   • Cyanocobalamin (VITAMIN B-12 SL) Place  under tongue every day.     • citalopram (CELEXA) 20 MG Tab TAKE ONE TABLET BY MOUTH ONCE DAILY 90 Tab 3   • potassium chloride SA (K-DUR) 10 MEQ Tab CR Take 1 Tab by mouth every day. 90 Tab 3   • carvedilol (COREG) 25 MG Tab Take 1 Tab by mouth 2 times a day, with meals. 180 Tab 3   • losartan (COZAAR) 50 MG Tab Take 1 Tab by mouth every day. 90 Tab 3   • metformin (GLUCOPHAGE) 1000 MG tablet Take 1,000 mg by mouth 2 times a day, with meals.     • aspirin (ASA) 325 MG TABS Take 325 mg by mouth every day.     • Multiple Vitamin (MULTI-VITAMINS) TABS Take 1 Tab by mouth every day.     • Omega-3 Fatty Acids (FISH OIL) 300 MG CAPS Take 1 Tab by mouth every day.     • Calcium Carbonate-Vitamin D (CALCIUM 600+D) 600-200 MG-UNIT TABS Take 2 Tabs by mouth every day.     • allopurinol (ZYLOPRIM) 300 MG TABS Take 300 mg by mouth 2 times a day.     • gabapentin (NEURONTIN) 300 MG CAPS Take 600 mg by mouth 3 times a day.       No current facility-administered medications for this visit.

## 2017-04-13 NOTE — TELEPHONE ENCOUNTER
Pt also needs an RX for Spiriva.     Have we ever prescribed this med? Yes.  If yes, what date? 03/13/17    Last OV: 03/13/17    Next OV: 07/19/17    DX: RAFFI    Medications: Spiriva Respimat

## 2017-04-13 NOTE — TELEPHONE ENCOUNTER
[facility] is requesting that a new rx for Spiriva Respimat to be sent to pharmacy, called over to [facility] pharmacist Jose stated that the pt had 6 refills on file so called those into the [facility] 90/3.

## 2017-04-13 NOTE — TELEPHONE ENCOUNTER
Prescription faxed to:      [facility] - [address]  [address]  [facility] [address]  Phone: [phone] Fax: [phone]      Marcelino smith/darcie

## 2017-04-18 DIAGNOSIS — I50.30 DIASTOLIC CONGESTIVE HEART FAILURE, UNSPECIFIED CONGESTIVE HEART FAILURE CHRONICITY: ICD-10-CM

## 2017-04-18 DIAGNOSIS — I10 ESSENTIAL HYPERTENSION, BENIGN: ICD-10-CM

## 2017-04-18 DIAGNOSIS — I47.20 VT (VENTRICULAR TACHYCARDIA) (HCC): ICD-10-CM

## 2017-04-18 RX ORDER — LOSARTAN POTASSIUM 50 MG/1
50 TABLET ORAL DAILY
Qty: 90 TAB | Refills: 3 | Status: SHIPPED | OUTPATIENT
Start: 2017-04-18 | End: 2017-09-14

## 2017-04-18 RX ORDER — POTASSIUM CHLORIDE 750 MG/1
10 TABLET, EXTENDED RELEASE ORAL DAILY
Qty: 90 TAB | Refills: 3 | Status: ON HOLD | OUTPATIENT
Start: 2017-04-18 | End: 2017-11-10

## 2017-04-18 RX ORDER — FUROSEMIDE 40 MG/1
40 TABLET ORAL 2 TIMES DAILY
Qty: 180 TAB | Refills: 3 | Status: SHIPPED | OUTPATIENT
Start: 2017-04-18 | End: 2017-11-05

## 2017-04-18 RX ORDER — DIGOXIN 125 MCG
125 TABLET ORAL DAILY
Qty: 90 TAB | Refills: 3 | Status: SHIPPED | OUTPATIENT
Start: 2017-04-18 | End: 2017-11-30 | Stop reason: SDUPTHER

## 2017-04-18 RX ORDER — CARVEDILOL 25 MG/1
25 TABLET ORAL 2 TIMES DAILY WITH MEALS
Qty: 180 TAB | Refills: 3 | Status: ON HOLD | OUTPATIENT
Start: 2017-04-18 | End: 2017-11-10

## 2017-05-02 ENCOUNTER — NON-PROVIDER VISIT (OUTPATIENT)
Dept: CARDIOLOGY | Facility: MEDICAL CENTER | Age: 67
End: 2017-05-02
Payer: MEDICARE

## 2017-05-02 ENCOUNTER — OFFICE VISIT (OUTPATIENT)
Dept: CARDIOLOGY | Facility: MEDICAL CENTER | Age: 67
End: 2017-05-02
Payer: MEDICARE

## 2017-05-02 VITALS
DIASTOLIC BLOOD PRESSURE: 72 MMHG | SYSTOLIC BLOOD PRESSURE: 132 MMHG | WEIGHT: 167 LBS | HEIGHT: 60 IN | BODY MASS INDEX: 32.79 KG/M2 | HEART RATE: 88 BPM | OXYGEN SATURATION: 99 %

## 2017-05-02 DIAGNOSIS — I47.20 VENTRICULAR TACHYCARDIA (HCC): ICD-10-CM

## 2017-05-02 DIAGNOSIS — C34.00 MALIGNANT NEOPLASM OF HILUS OF LUNG, UNSPECIFIED LATERALITY (HCC): ICD-10-CM

## 2017-05-02 DIAGNOSIS — Z95.810 PRESENCE OF BIVENTRICULAR AICD: ICD-10-CM

## 2017-05-02 DIAGNOSIS — I44.7 LBBB (LEFT BUNDLE BRANCH BLOCK): ICD-10-CM

## 2017-05-02 DIAGNOSIS — I42.0 DILATED CARDIOMYOPATHY (HCC): ICD-10-CM

## 2017-05-02 DIAGNOSIS — I50.20 CHF (CONGESTIVE HEART FAILURE), NYHA CLASS III, UNSPECIFIED FAILURE CHRONICITY, SYSTOLIC (HCC): ICD-10-CM

## 2017-05-02 DIAGNOSIS — I50.9 CONGESTIVE HEART FAILURE, UNSPECIFIED CONGESTIVE HEART FAILURE CHRONICITY, UNSPECIFIED CONGESTIVE HEART FAILURE TYPE: ICD-10-CM

## 2017-05-02 DIAGNOSIS — C82.80 OTHER TYPE OF FOLLICULAR LYMPHOMA, UNSPECIFIED BODY REGION (HCC): ICD-10-CM

## 2017-05-02 DIAGNOSIS — Z95.810 PRESENCE OF BIVENTRICULAR AUTOMATIC CARDIOVERTER/DEFIBRILLATOR (AICD): ICD-10-CM

## 2017-05-02 PROCEDURE — 4040F PNEUMOC VAC/ADMIN/RCVD: CPT | Mod: 8P | Performed by: INTERNAL MEDICINE

## 2017-05-02 PROCEDURE — G8432 DEP SCR NOT DOC, RNG: HCPCS | Performed by: INTERNAL MEDICINE

## 2017-05-02 PROCEDURE — 99214 OFFICE O/P EST MOD 30 MIN: CPT | Performed by: INTERNAL MEDICINE

## 2017-05-02 PROCEDURE — 1036F TOBACCO NON-USER: CPT | Performed by: INTERNAL MEDICINE

## 2017-05-02 PROCEDURE — 93284 PRGRMG EVAL IMPLANTABLE DFB: CPT | Performed by: INTERNAL MEDICINE

## 2017-05-02 PROCEDURE — 3017F COLORECTAL CA SCREEN DOC REV: CPT | Mod: 8P | Performed by: INTERNAL MEDICINE

## 2017-05-02 PROCEDURE — G8417 CALC BMI ABV UP PARAM F/U: HCPCS | Performed by: INTERNAL MEDICINE

## 2017-05-02 PROCEDURE — 1101F PT FALLS ASSESS-DOCD LE1/YR: CPT | Mod: 8P | Performed by: INTERNAL MEDICINE

## 2017-05-02 PROCEDURE — 3014F SCREEN MAMMO DOC REV: CPT | Performed by: INTERNAL MEDICINE

## 2017-05-02 RX ORDER — ALBUTEROL SULFATE 90 UG/1
2 AEROSOL, METERED RESPIRATORY (INHALATION) EVERY 6 HOURS PRN
COMMUNITY
End: 2017-05-12 | Stop reason: SDUPTHER

## 2017-05-02 NOTE — PROGRESS NOTES
Subjective:   Isatu Dang is a 66 y.o. female who presents today with NICM and LBBB with BIV AICD. Mild increase in SOB and fatigue. Not hospitalize. Thoracic imp now ok. Mo chest pain. Low grade non hodgkin's lymphoma. Previous [place] cancer.     Past Medical History   Diagnosis Date   • Joint replacement          • Neuropathy    • Dilated cardiomyopathy    • Hypercholesteremia    • Obesity    • Peripheral neuropathy    • Depression    • Unspecified hemorrhagic conditions      from aspirin   • Renal cyst, acquired, left       History of benign cystic mass on the lower left kidney.   • Hypertension 1998     Pt states bp runs low on current meds   • Myocardial infarct (CMS-[facility]) 1998   • Congestive heart failure (CHF) (CMS-[facility])    • Arrhythmia    • Cardiac arrest (CMS-[facility]) 1998         • LBBB (left bundle branch block)    • Ventricular tachycardia (CMS-[facility])    • Pain 2011     Legs pain controled on meds   • Indigestion    • Sleep apnea    • Lung cancer (CMS-[facility]) 2012         • Cancer (CMS-[facility]) 2012     Lymphoma to neck   • CATARACT      Beginning stages no surgery yet   • Diabetes (CMS-[facility])    • Bowel habit changes      Diarrhea.   • Dental disorder      Upper/Lower dentures.     Past Surgical History   Procedure Laterality Date   • Hysterectomy, total abdominal  1995   • Implantable cardioverter defibrillator (icd)  4/1/98, 03/2007, 10/2011   • Carpal tunnel release Right 05/07         • Carpal tunnel release  4/3/08     Performed by DEEP GREEN at SURGERY [facility] ORS   • Lipoma excision  4/3/08     Performed by DEEP GREEN at SURGERY [facility] ORS   • Recovery  10/21/2011     Performed by SURGERY, [facility]-RECOVERY at SURGERY SAME DAY [facility] ORS   • Hysterectomy, total abdominal           • Tonsillectomy and adenoidectomy           • Neck mass excision  5/24/2012     Performed by LAURA RAMIRES at SURGERY SAME DAY [facility] ORS   • Bone marrow aspiration  6/22/2012     Performed by KARLOS  ISAIAS at ENDOSCOPY [facility] ORS   • Bone marrow biopsy, ndl/trocar  6/22/2012     Performed by ISAIAS EVERETT at ENDOSCOPY [facility] ORS   • Recovery  7/23/2012     Performed by SURGERY, IR-RECOVERY at SURGERY SAME DAY [facility] ORS   • Thoracoscopy robotic  8/27/2012     Performed by GANSER, JOHN H at SURGERY [facility] ORS   • Laryngoscopy  11/14/2013     Performed by Judson Willis M.D. at SURGERY SAME DAY [facility] ORS   • Biopsy general  11/14/2013     Performed by Judson Willis M.D. at SURGERY SAME DAY [facility] ORS   • Aicd battery change  November 2016     Generator replacement with [employer] Viva S CRT-D [id number] implanted by Dr. Alexis. Original device implanted in 1998     Family History   Problem Relation Age of Onset   • Diabetes Mother    • Cancer Mother    • Diabetes Maternal Grandmother    • Diabetes Sister      History   Smoking status   • Former Smoker -- 2.00 packs/day for 30 years   • Types: Cigarettes   • Quit date: 10/20/1998   Smokeless tobacco   • Never Used     Allergies   Allergen Reactions   • Ace Inhibitors      cough   • Tape Contact Dermatitis     Outpatient Encounter Prescriptions as of 5/2/2017   Medication Sig Dispense Refill   • albuterol (PROAIR HFA) 108 (90 BASE) MCG/ACT Aero Soln inhalation aerosol Inhale 2 Puffs by mouth every 6 hours as needed for Shortness of Breath.     • carvedilol (COREG) 25 MG Tab Take 1 Tab by mouth 2 times a day, with meals. 180 Tab 3   • digoxin (LANOXIN) 125 MCG Tab Take 1 Tab by mouth every day. 90 Tab 3   • furosemide (LASIX) 40 MG Tab Take 1 Tab by mouth 2 Times a Day. 180 Tab 3   • losartan (COZAAR) 50 MG Tab Take 1 Tab by mouth every day. 90 Tab 3   • potassium chloride SA (K-DUR) 10 MEQ Tab CR Take 1 Tab by mouth every day. 90 Tab 3   • zolpidem (AMBIEN) 10 MG Tab TAKE ONE TABLET BY MOUTH AT BEDTIME, as needed prn insomnia 90 Tab 1   • Tiotropium Bromide Monohydrate (SPIRIVA RESPIMAT) 2.5 MCG/ACT Aero Soln Inhale 2 Inhalation by mouth  every day. Assemble and prime. 3 Inhaler 3   • Cyanocobalamin (VITAMIN B-12 SL) Place  under tongue every day.     • citalopram (CELEXA) 20 MG Tab TAKE ONE TABLET BY MOUTH ONCE DAILY 90 Tab 3   • metformin (GLUCOPHAGE) 1000 MG tablet Take 1,000 mg by mouth 2 times a day, with meals.     • aspirin (ASA) 325 MG TABS Take 325 mg by mouth every day.     • Multiple Vitamin (MULTI-VITAMINS) TABS Take 1 Tab by mouth every day.     • Omega-3 Fatty Acids (FISH OIL) 300 MG CAPS Take 1 Tab by mouth every day.     • Calcium Carbonate-Vitamin D (CALCIUM 600+D) 600-200 MG-UNIT TABS Take 2 Tabs by mouth every day.     • allopurinol (ZYLOPRIM) 300 MG TABS Take 300 mg by mouth 2 times a day.     • gabapentin (NEURONTIN) 300 MG CAPS Take 600 mg by mouth 3 times a day.     • albuterol (VENTOLIN HFA) 108 (90 BASE) MCG/ACT Aero Soln inhalation aerosol Inhale 2 Puffs by mouth every four hours as needed for Shortness of Breath (Wheezing). 1 Inhaler 6     No facility-administered encounter medications on file as of 5/2/2017.     ROS     Objective:   /72 mmHg  Pulse 88  Ht 1.524 m (5')  Wt 75.751 kg (167 lb)  BMI 32.62 kg/m2  SpO2 99%    Physical Exam   Constitutional: She is oriented to person, place, and time. She appears well-developed and well-nourished. No distress.   HENT:   Mouth/Throat: Oropharynx is clear and moist.   Eyes: Conjunctivae and EOM are normal.   Neck: Neck supple. No JVD present. No thyroid mass present.   Cardiovascular: Normal rate, regular rhythm, S1 normal, S2 normal and normal pulses.  PMI is not displaced.  Exam reveals no gallop.    No murmur heard.  Pulses:       Carotid pulses are 2+ on the right side, and 2+ on the left side.       Radial pulses are 2+ on the right side, and 2+ on the left side.        Femoral pulses are 2+ on the right side, and 2+ on the left side.       Dorsalis pedis pulses are 2+ on the right side, and 2+ on the left side.   No peripheral edema.   Pulmonary/Chest: Effort  normal and breath sounds normal.   Abdominal: Soft. Normal appearance. She exhibits no abdominal bruit and no mass. There is no hepatosplenomegaly. There is no tenderness.   Musculoskeletal: Normal range of motion. She exhibits no edema.        Lumbar back: She exhibits no tenderness and no spasm.   Neurological: She is alert and oriented to person, place, and time. She has normal strength.   Skin: Skin is warm and dry. No rash noted. No cyanosis. Nails show no clubbing.   Psychiatric: She has a normal mood and affect.       Assessment:     1. CHF (congestive heart failure), NYHA class III, unspecified failure chronicity, systolic (CMS-HCC)  Echocardiogram Comp w/o Cont    COMP METABOLIC PANEL    BTYPE NATRIURETIC PEPTIDE   2. Ventricular tachycardia (CMS-HCC)     3. Other type of follicular lymphoma, unspecified body region (CMS-HCC)     4. Malignant neoplasm of hilus of lung, unspecified laterality (CMS-HCC)     5. Dilated cardiomyopathy (CMS-HCC)     6. LBBB (left bundle branch block)     7. Presence of biventricular AICD         Medical Decision Making:  Today's Assessment / Status / Plan:     1. CHF and NICM check BNP and CMP and echo may send over for entresto.  2. BIV AICD nl function.  3. Lymphoma low grade followed by Onc.  4. F/U in 4 months.

## 2017-05-02 NOTE — MR AVS SNAPSHOT
Isatu Dang   2017 9:40 AM   Office Visit   MRN: [id number]    Department:  [facility]   Dept Phone:  [phone]    Description:  Female : 1950   Provider:  Felix Alexis M.D.           Reason for Visit     Follow-Up           Allergies as of 2017     Allergen Noted Reactions    Ace Inhibitors 10/20/2011       cough    Tape 2011   Contact Dermatitis      You were diagnosed with     CHF (congestive heart failure), NYHA class III, unspecified failure chronicity, systolic (CMS-[facility])   [4068232]       Ventricular tachycardia (CMS-[facility])   [417528]       Other type of follicular lymphoma, unspecified body region (CMS-[facility])   [9633205]       Malignant neoplasm of hilus of lung, unspecified laterality (CMS-[facility])   [646349]       Dilated cardiomyopathy (CMS-[facility])   [864637]       LBBB (left bundle branch block)   [547224]       Presence of biventricular AICD   [235382]         Vital Signs     Blood Pressure Pulse Height Weight Body Mass Index Oxygen Saturation    132/72 mmHg 88 1.524 m (5') 75.751 kg (167 lb) 32.62 kg/m2 99%    Smoking Status                   Former Smoker           Basic Information     Date Of Birth Sex Race Ethnicity Preferred Language    1950 Female White Non- English      Your appointments     2017  9:40 AM   Follow UP with JEAN CLAUDE Coe   [facility] (--)    [address] [id number] [phone]              Problem List              ICD-10-CM Priority Class Noted - Resolved    Congestive heart failure (CHF) (CMS-HCC) I50.9   Unknown - Present    Depression F32.9   Unknown - Present    Type 2 diabetes mellitus without complication (CMS-HCC) E11.9   Unknown - Present    Dilated cardiomyopathy (CMS-HCC) I42.0   Unknown - Present    Hypercholesteremia E78.00   Unknown - Present    LBBB (left bundle branch block) I44.7   Unknown - Present    Obesity E66.9   Unknown - Present    Peripheral neuropathy G62.9   Unknown - Present    Renal cyst, acquired, left N28.1   Unknown - Present    Ventricular tachycardia (CMS-HCC) I47.2   Unknown - Present    Presence of biventricular AICD Z95.810 Medium  12/20/2011 - Present    Lymphoma (CMS-HCC) C85.90   8/17/2012 - Present    Mediastinal mass J98.59   8/17/2012 - Present    Insomnia G47.00   4/17/2013 - Present    Obstructive sleep apnea G47.33 Low  4/17/2013 - Present    Lung cancer (CMS-HCC) C34.90   7/16/2013 - Present    Acute systolic heart failure (CMS-HCC) I50.21 High  1/30/2016 - Present    Type 2 diabetes mellitus with diabetic polyneuropathy (CMS-HCC) E11.42 Low  1/31/2016 - Present    Essential hypertension I10 Medium  1/31/2016 - Present    Aortic regurgitation I35.1   5/27/2016 - Present    Nocturnal hypoxemia G47.34   7/26/2016 - Present    Restrictive lung disease J98.4   7/26/2016 - Present      Health Maintenance        Date Due Completion Dates    DIABETES MONOFILAMENT / LE EXAM 5/15/1949 ---    RETINAL SCREENING 11/15/1966 ---    IMM DTaP/Tdap/Td Vaccine (1 - Tdap) 11/15/1967 ---    PAP SMEAR 11/15/1969 ---    COLONOSCOPY 11/15/1998 ---    IMM ZOSTER VACCINE 11/15/2008 ---    IMM PNEUMOCOCCAL 65+ (ADULT) HIGH/HIGHEST RISK SERIES (1 of 2 - PCV13) 11/15/2013 ---    A1C SCREENING 7/18/2016 1/18/2016, 11/24/2015, 8/24/2015, 3/26/2014, 6/14/2011    FASTING LIPID PROFILE 1/18/2017 1/18/2016, 8/24/2015, 3/26/2014, 2/24/2012, 9/12/2011, 6/14/2011    URINE ACR / MICROALBUMIN 1/18/2017 1/18/2016, 11/24/2015    BONE DENSITY 3/5/2017 3/5/2012    MAMMOGRAM 10/26/2017 10/26/2016, 9/4/2015, 3/28/2014, 3/24/2014, 3/22/2013, 3/5/2012, 11/5/2010, 11/17/2008, 11/17/2008, 6/17/2008, 6/17/2008, 11/26/2007, 10/18/2007, 10/18/2007, 5/12/2006, 8/5/2004    SERUM CREATININE 2/6/2018 2/6/2017, 11/9/2016, 7/12/2016, 5/24/2016, 1/30/2016, 1/30/2016, 1/18/2016, 8/24/2015, 7/27/2015, 5/11/2015, 11/14/2014, 10/6/2014, 3/26/2014, 11/26/2013, 11/12/2013, 6/6/2013,  3/5/2013, 3/5/2013, 11/14/2012, 8/28/2012, 8/13/2012, 6/14/2012, 5/22/2012, 3/23/2012, 2/24/2012, 10/20/2011, 9/13/2011, 9/11/2011, 6/14/2011, 3/25/2008, 5/21/2007            Current Immunizations     Influenza TIV (IM) 10/1/2015, 9/14/2011 11:27 AM    Pneumococcal Vaccine (PCV7) Historical Data 10/1/2015    Pneumococcal Vaccine (UF)Historical Data 1/12/2009      Below and/or attached are the medications your provider expects you to take. Review all of your home medications and newly ordered medications with your provider and/or pharmacist. Follow medication instructions as directed by your provider and/or pharmacist. Please keep your medication list with you and share with your provider. Update the information when medications are discontinued, doses are changed, or new medications (including over-the-counter products) are added; and carry medication information at all times in the event of emergency situations     Allergies:  ACE INHIBITORS - (reactions not documented)     TAPE - Contact Dermatitis               Medications  Valid as of: May 02, 2017 -  9:55 AM    Generic Name Brand Name Tablet Size Instructions for use    Albuterol Sulfate (Aero Soln) albuterol 108 (90 BASE) MCG/ACT Inhale 2 Puffs by mouth every four hours as needed for Shortness of Breath (Wheezing).        Albuterol Sulfate (Aero Soln) albuterol 108 (90 BASE) MCG/ACT Inhale 2 Puffs by mouth every 6 hours as needed for Shortness of Breath.        Allopurinol (Tab) ZYLOPRIM 300 MG Take 300 mg by mouth 2 times a day.        Aspirin (Tab)  MG Take 325 mg by mouth every day.        Calcium Carbonate-Vitamin D (Tab) Calcium Carbonate-Vitamin D 600-200 MG-UNIT Take 2 Tabs by mouth every day.        Carvedilol (Tab) COREG 25 MG Take 1 Tab by mouth 2 times a day, with meals.        Citalopram Hydrobromide (Tab) CELEXA 20 MG TAKE ONE TABLET BY MOUTH ONCE DAILY        Cyanocobalamin   Place  under tongue every day.        Digoxin (Tab) LANOXIN 125  MCG Take 1 Tab by mouth every day.        Furosemide (Tab) LASIX 40 MG Take 1 Tab by mouth 2 Times a Day.        Gabapentin (Cap) NEURONTIN 300 MG Take 600 mg by mouth 3 times a day.        Losartan Potassium (Tab) COZAAR 50 MG Take 1 Tab by mouth every day.        MetFORMIN HCl (Tab) GLUCOPHAGE 1000 MG Take 1,000 mg by mouth 2 times a day, with meals.        Multiple Vitamin (Tab) MULTI-VITAMINS  Take 1 Tab by mouth every day.        Omega-3 Fatty Acids (Cap) Fish Oil 300 MG Take 1 Tab by mouth every day.        Potassium Chloride Crystal CR (Tab CR) K-DUR 10 MEQ Take 1 Tab by mouth every day.        Tiotropium Bromide Monohydrate (Aero Soln) Tiotropium Bromide Monohydrate 2.5 MCG/ACT Inhale 2 Inhalation by mouth every day. Assemble and prime.        Zolpidem Tartrate (Tab) AMBIEN 10 MG TAKE ONE TABLET BY MOUTH AT BEDTIME, as needed prn insomnia        .                 Medicines prescribed today were sent to:     [facility] - [address] [facility] [address]    Phone: [phone] Fax: [phone]    Open 24 Hours?: No      Medication refill instructions:       If your prescription bottle indicates you have medication refills left, it is not necessary to call your provider’s office. Please contact your pharmacy and they will refill your medication.    If your prescription bottle indicates you do not have any refills left, you may request refills at any time through one of the following ways: The online [employer] system (except Urgent Care), by calling your provider’s office, or by asking your pharmacy to contact your provider’s office with a refill request. Medication refills are processed only during regular business hours and may not be available until the next business day. Your provider may request additional information or to have a follow-up visit with you prior to refilling your medication.   *Please Note: Medication refills are assigned a  new Rx number when refilled electronically. Your pharmacy may indicate that no refills were authorized even though a new prescription for the same medication is available at the pharmacy. Please request the medicine by name with the pharmacy before contacting your provider for a refill.        Your To Do List     Future Labs/Procedures Complete By Expires    BTYPE NATRIURETIC PEPTIDE  As directed 5/2/2018    COMP METABOLIC PANEL  As directed 5/2/2018    Echocardiogram Comp w/o Cont  As directed 5/2/2018         MyChart Access Code: Activation code not generated  Current [employer] Status: Active

## 2017-05-02 NOTE — Clinical Note
[facility]   [address]  [place] [address]  Phone: [phone]  Fax: [phone]              Isatu Dang  1950    Encounter Date: 5/2/2017    Felix Alexis M.D.          PROGRESS NOTE:  Subjective:   Isatu Dang is a 66 y.o. female who presents today with NICM and LBBB with BIV AICD. Mild increase in SOB and fatigue. Not hospitalize. Thoracic imp now ok. Mo chest pain. Low grade non hodgkin's lymphoma. Previous [place] cancer.     Past Medical History   Diagnosis Date   • Joint replacement          • Neuropathy    • Dilated cardiomyopathy    • Hypercholesteremia    • Obesity    • Peripheral neuropathy    • Depression    • Unspecified hemorrhagic conditions      from aspirin   • Renal cyst, acquired, left       History of benign cystic mass on the lower left kidney.   • Hypertension 1998     Pt states bp runs low on current meds   • Myocardial infarct (CMS-[facility]) 1998   • Congestive heart failure (CHF) (CMS-[facility])    • Arrhythmia    • Cardiac arrest (CMS-[facility]) 1998         • LBBB (left bundle branch block)    • Ventricular tachycardia (CMS-[facility])    • Pain 2011     Legs pain controled on meds   • Indigestion    • Sleep apnea    • Lung cancer (CMS-[facility]) 2012         • Cancer (CMS-[facility]) 2012     Lymphoma to neck   • CATARACT      Beginning stages no surgery yet   • Diabetes (CMS-[facility])    • Bowel habit changes      Diarrhea.   • Dental disorder      Upper/Lower dentures.     Past Surgical History   Procedure Laterality Date   • Hysterectomy, total abdominal  1995   • Implantable cardioverter defibrillator (icd)  4/1/98, 03/2007, 10/2011   • Carpal tunnel release Right 05/07         • Carpal tunnel release  4/3/08     Performed by DEEP GREEN at SURGERY [facility] ORS   • Lipoma excision  4/3/08     Performed by DEEP GREEN at [facility] ORS   • Recovery  10/21/2011     Performed by SURGERY, [facility] at SURGERY SAME DAY  [facility] ORS   • Hysterectomy, total abdominal           • Tonsillectomy and adenoidectomy           • Neck mass excision  5/24/2012     Performed by JUDSON WILLIS at SURGERY SAME DAY [facility] ORS   • Bone marrow aspiration  6/22/2012     Performed by ISAIAS EVERETT at ENDOSCOPY [facility] ORS   • Bone marrow biopsy, ndl/trocar  6/22/2012     Performed by ISAIAS EVERETT at ENDOSCOPY [facility] ORS   • Recovery  7/23/2012     Performed by SURGERY, IR-RECOVERY at SURGERY SAME DAY [facility] ORS   • Thoracoscopy robotic  8/27/2012     Performed by GANSER, JOHN H at SURGERY [facility] ORS   • Laryngoscopy  11/14/2013     Performed by Judson Willis M.D. at SURGERY SAME DAY [facility]   • Biopsy general  11/14/2013     Performed by Judson Willis M.D. at SURGERY SAME DAY [facility]   • Aicd battery change  November 2016     Generator replacement with [employer] Viva S CRT-D [id number] implanted by Dr. Alexis. Original device implanted in 1998     Family History   Problem Relation Age of Onset   • Diabetes Mother    • Cancer Mother    • Diabetes Maternal Grandmother    • Diabetes Sister      History   Smoking status   • Former Smoker -- 2.00 packs/day for 30 years   • Types: Cigarettes   • Quit date: 10/20/1998   Smokeless tobacco   • Never Used     Allergies   Allergen Reactions   • Ace Inhibitors      cough   • Tape Contact Dermatitis     Outpatient Encounter Prescriptions as of 5/2/2017   Medication Sig Dispense Refill   • albuterol (PROAIR HFA) 108 (90 BASE) MCG/ACT Aero Soln inhalation aerosol Inhale 2 Puffs by mouth every 6 hours as needed for Shortness of Breath.     • carvedilol (COREG) 25 MG Tab Take 1 Tab by mouth 2 times a day, with meals. 180 Tab 3   • digoxin (LANOXIN) 125 MCG Tab Take 1 Tab by mouth every day. 90 Tab 3   • furosemide (LASIX) 40 MG Tab Take 1 Tab by mouth 2 Times a Day. 180 Tab 3   • losartan (COZAAR) 50 MG Tab Take 1 Tab by mouth every day. 90 Tab 3   • potassium chloride SA  (K-DUR) 10 MEQ Tab CR Take 1 Tab by mouth every day. 90 Tab 3   • zolpidem (AMBIEN) 10 MG Tab TAKE ONE TABLET BY MOUTH AT BEDTIME, as needed prn insomnia 90 Tab 1   • Tiotropium Bromide Monohydrate (SPIRIVA RESPIMAT) 2.5 MCG/ACT Aero Soln Inhale 2 Inhalation by mouth every day. Assemble and prime. 3 Inhaler 3   • Cyanocobalamin (VITAMIN B-12 SL) Place  under tongue every day.     • citalopram (CELEXA) 20 MG Tab TAKE ONE TABLET BY MOUTH ONCE DAILY 90 Tab 3   • metformin (GLUCOPHAGE) 1000 MG tablet Take 1,000 mg by mouth 2 times a day, with meals.     • aspirin (ASA) 325 MG TABS Take 325 mg by mouth every day.     • Multiple Vitamin (MULTI-VITAMINS) TABS Take 1 Tab by mouth every day.     • Omega-3 Fatty Acids (FISH OIL) 300 MG CAPS Take 1 Tab by mouth every day.     • Calcium Carbonate-Vitamin D (CALCIUM 600+D) 600-200 MG-UNIT TABS Take 2 Tabs by mouth every day.     • allopurinol (ZYLOPRIM) 300 MG TABS Take 300 mg by mouth 2 times a day.     • gabapentin (NEURONTIN) 300 MG CAPS Take 600 mg by mouth 3 times a day.     • albuterol (VENTOLIN HFA) 108 (90 BASE) MCG/ACT Aero Soln inhalation aerosol Inhale 2 Puffs by mouth every four hours as needed for Shortness of Breath (Wheezing). 1 Inhaler 6     No facility-administered encounter medications on file as of 5/2/2017.     ROS     Objective:   /72 mmHg  Pulse 88  Ht 1.524 m (5')  Wt 75.751 kg (167 lb)  BMI 32.62 kg/m2  SpO2 99%    Physical Exam   Constitutional: She is oriented to person, place, and time. She appears well-developed and well-nourished. No distress.   HENT:   Mouth/Throat: Oropharynx is clear and moist.   Eyes: Conjunctivae and EOM are normal.   Neck: Neck supple. No JVD present. No thyroid mass present.   Cardiovascular: Normal rate, regular rhythm, S1 normal, S2 normal and normal pulses.  PMI is not displaced.  Exam reveals no gallop.    No murmur heard.  Pulses:       Carotid pulses are 2+ on the right side, and 2+ on the left side.        Radial pulses are 2+ on the right side, and 2+ on the left side.        Femoral pulses are 2+ on the right side, and 2+ on the left side.       Dorsalis pedis pulses are 2+ on the right side, and 2+ on the left side.   No peripheral edema.   Pulmonary/Chest: Effort normal and breath sounds normal.   Abdominal: Soft. Normal appearance. She exhibits no abdominal bruit and no mass. There is no hepatosplenomegaly. There is no tenderness.   Musculoskeletal: Normal range of motion. She exhibits no edema.        Lumbar back: She exhibits no tenderness and no spasm.   Neurological: She is alert and oriented to person, place, and time. She has normal strength.   Skin: Skin is warm and dry. No rash noted. No cyanosis. Nails show no clubbing.   Psychiatric: She has a normal mood and affect.       Assessment:     1. CHF (congestive heart failure), NYHA class III, unspecified failure chronicity, systolic (CMS-HCC)  Echocardiogram Comp w/o Cont    COMP METABOLIC PANEL    BTYPE NATRIURETIC PEPTIDE   2. Ventricular tachycardia (CMS-HCC)     3. Other type of follicular lymphoma, unspecified body region (CMS-HCC)     4. Malignant neoplasm of hilus of lung, unspecified laterality (CMS-HCC)     5. Dilated cardiomyopathy (CMS-HCC)     6. LBBB (left bundle branch block)     7. Presence of biventricular AICD         Medical Decision Making:  Today's Assessment / Status / Plan:     1. CHF and NICM check BNP and CMP and echo may send over for entresto.  2. BIV AICD nl function.  3. Lymphoma low grade followed by Onc.  4. F/U in 4 months.      Edwin Urias M.D.  [address] [place] [address] [address]  [address]  [place] [id number]  VIA Facsimile: [phone]

## 2017-05-12 DIAGNOSIS — R06.02 SOB (SHORTNESS OF BREATH): ICD-10-CM

## 2017-05-12 RX ORDER — ALBUTEROL SULFATE 90 UG/1
2 AEROSOL, METERED RESPIRATORY (INHALATION) EVERY 6 HOURS PRN
Qty: 3 INHALER | Refills: 3 | Status: SHIPPED | OUTPATIENT
Start: 2017-05-12

## 2017-05-12 NOTE — TELEPHONE ENCOUNTER
Pharmacy is requesting a new rx for Proair HFA AER    Last OV: 3/13/17  Next OV: 7/19/17  restrictive lung disease

## 2017-06-01 ENCOUNTER — HOSPITAL ENCOUNTER (OUTPATIENT)
Dept: CARDIOLOGY | Facility: MEDICAL CENTER | Age: 67
End: 2017-06-01
Attending: INTERNAL MEDICINE
Payer: MEDICARE

## 2017-06-01 DIAGNOSIS — I50.20 CHF (CONGESTIVE HEART FAILURE), NYHA CLASS III, UNSPECIFIED FAILURE CHRONICITY, SYSTOLIC (HCC): ICD-10-CM

## 2017-06-01 PROCEDURE — 93306 TTE W/DOPPLER COMPLETE: CPT | Mod: 26 | Performed by: INTERNAL MEDICINE

## 2017-06-01 PROCEDURE — 93306 TTE W/DOPPLER COMPLETE: CPT

## 2017-06-02 LAB
LV EJECT FRACT  99904: 35
LV EJECT FRACT MOD 2C 99903: 33.9
LV EJECT FRACT MOD 4C 99902: 41.67
LV EJECT FRACT MOD BP 99901: 39.66

## 2017-06-13 ENCOUNTER — TELEPHONE (OUTPATIENT)
Dept: CARDIOLOGY | Facility: MEDICAL CENTER | Age: 67
End: 2017-06-13

## 2017-06-13 NOTE — TELEPHONE ENCOUNTER
Echocardiogram Comp w/o Cont   Status: Final result     Visible to patient:  Tee     Dx:  CHF (congestive heart failure), NYHA ...               Notes Recorded by Felix Alexis M.D. on 6/12/2017 at 4:44 PM  Tell her the echo is stable  Notes Recorded by Amelia Heck R.N. on 6/2/2017 at 3:42 PM  Follow up   DS 9/12/17     Pt notified of echo results per Dr. Alexis, pt to FU 9/12

## 2017-07-13 DIAGNOSIS — G47.00 INSOMNIA, UNSPECIFIED TYPE: ICD-10-CM

## 2017-07-13 RX ORDER — ZOLPIDEM TARTRATE 10 MG/1
TABLET ORAL
Qty: 90 TAB | Refills: 1 | Status: SHIPPED
Start: 2017-07-13 | End: 2017-12-06 | Stop reason: SDUPTHER

## 2017-07-13 NOTE — TELEPHONE ENCOUNTER
Have we ever prescribed this med? Yes.  If yes, what date? 4/13/17    Last OV: 3/13/17    Next OV: 7/19/17    DX: Insomnia    Medications: Zolpidem 10 MG  Current Outpatient Prescriptions   Medication Sig Dispense Refill   • albuterol (PROAIR HFA) 108 (90 BASE) MCG/ACT Aero Soln inhalation aerosol Inhale 2 Puffs by mouth every 6 hours as needed for Shortness of Breath. 3 Inhaler 3   • carvedilol (COREG) 25 MG Tab Take 1 Tab by mouth 2 times a day, with meals. 180 Tab 3   • digoxin (LANOXIN) 125 MCG Tab Take 1 Tab by mouth every day. 90 Tab 3   • furosemide (LASIX) 40 MG Tab Take 1 Tab by mouth 2 Times a Day. 180 Tab 3   • losartan (COZAAR) 50 MG Tab Take 1 Tab by mouth every day. 90 Tab 3   • potassium chloride SA (K-DUR) 10 MEQ Tab CR Take 1 Tab by mouth every day. 90 Tab 3   • zolpidem (AMBIEN) 10 MG Tab TAKE ONE TABLET BY MOUTH AT BEDTIME, as needed prn insomnia 90 Tab 1   • Tiotropium Bromide Monohydrate (SPIRIVA RESPIMAT) 2.5 MCG/ACT Aero Soln Inhale 2 Inhalation by mouth every day. Assemble and prime. 3 Inhaler 3   • albuterol (VENTOLIN HFA) 108 (90 BASE) MCG/ACT Aero Soln inhalation aerosol Inhale 2 Puffs by mouth every four hours as needed for Shortness of Breath (Wheezing). 1 Inhaler 6   • Cyanocobalamin (VITAMIN B-12 SL) Place  under tongue every day.     • citalopram (CELEXA) 20 MG Tab TAKE ONE TABLET BY MOUTH ONCE DAILY 90 Tab 3   • metformin (GLUCOPHAGE) 1000 MG tablet Take 1,000 mg by mouth 2 times a day, with meals.     • aspirin (ASA) 325 MG TABS Take 325 mg by mouth every day.     • Multiple Vitamin (MULTI-VITAMINS) TABS Take 1 Tab by mouth every day.     • Omega-3 Fatty Acids (FISH OIL) 300 MG CAPS Take 1 Tab by mouth every day.     • Calcium Carbonate-Vitamin D (CALCIUM 600+D) 600-200 MG-UNIT TABS Take 2 Tabs by mouth every day.     • allopurinol (ZYLOPRIM) 300 MG TABS Take 300 mg by mouth 2 times a day.     • gabapentin (NEURONTIN) 300 MG CAPS Take 600 mg by mouth 3 times a day.       No current  facility-administered medications for this visit.

## 2017-07-14 NOTE — TELEPHONE ENCOUNTER
Rx faxed to   [facility] - [place], [place] - [address] [place] [address]  [address]  [facility] [address]  Phone: [phone] Fax: [phone]    I spoke to the patient she is aware rx faxed and appt coming up

## 2017-07-19 ENCOUNTER — SLEEP CENTER VISIT (OUTPATIENT)
Dept: SLEEP MEDICINE | Facility: MEDICAL CENTER | Age: 67
End: 2017-07-19
Payer: MEDICARE

## 2017-07-19 VITALS
OXYGEN SATURATION: 93 % | BODY MASS INDEX: 32.79 KG/M2 | DIASTOLIC BLOOD PRESSURE: 62 MMHG | SYSTOLIC BLOOD PRESSURE: 110 MMHG | HEART RATE: 67 BPM | HEIGHT: 60 IN | WEIGHT: 167 LBS | RESPIRATION RATE: 16 BRPM

## 2017-07-19 DIAGNOSIS — R09.02 HYPOXEMIA: ICD-10-CM

## 2017-07-19 DIAGNOSIS — I27.20 PULMONARY HYPERTENSION (HCC): ICD-10-CM

## 2017-07-19 DIAGNOSIS — Z85.118 H/O: LUNG CANCER: ICD-10-CM

## 2017-07-19 DIAGNOSIS — J98.4 RESTRICTIVE LUNG DISEASE: ICD-10-CM

## 2017-07-19 DIAGNOSIS — C82.80 OTHER TYPE OF FOLLICULAR LYMPHOMA, UNSPECIFIED BODY REGION (HCC): ICD-10-CM

## 2017-07-19 DIAGNOSIS — G47.33 OBSTRUCTIVE SLEEP APNEA: ICD-10-CM

## 2017-07-19 DIAGNOSIS — G47.34 NOCTURNAL HYPOXEMIA: ICD-10-CM

## 2017-07-19 DIAGNOSIS — J98.59 MEDIASTINAL MASS: ICD-10-CM

## 2017-07-19 DIAGNOSIS — I50.40 COMBINED SYSTOLIC AND DIASTOLIC CONGESTIVE HEART FAILURE, UNSPECIFIED CONGESTIVE HEART FAILURE CHRONICITY: ICD-10-CM

## 2017-07-19 PROCEDURE — 99214 OFFICE O/P EST MOD 30 MIN: CPT | Performed by: NURSE PRACTITIONER

## 2017-07-19 NOTE — PATIENT INSTRUCTIONS
Plan:      1) Continue CPAP at 11 CM H20 with 2 LPM 02 bleed in. CNOX now on her current setting to ensure adequate 02 saturations given her pulmonary hypertension noted on Echo.   2) Sleep hygiene discussed. Continue prn Ambien qhs.    3) Continue Spiriva 2.5 mcg respimat 2 puffs INH daily along with an Albuterol HFA inhaler 2 puffs q 4 hours as needed. Encouraged routine walking/exercise. Recommend updated PFT's given decline in FEV1 noted in March.   4) Continue to follow with Cardiology.  5) Continue to follow with Oncology. Request consult records from Dr. Rodríguez including recent CT imaging.    6) Continue 02 2 LPM prn exertion. Obtain 6 minute walk test to evaluate 02 requirements.   7) 1 month follow up at the Pulmonary office with results of her overnight oximetry with updated PFT's and a 6 minute walk test, sooner if needed.

## 2017-07-19 NOTE — MR AVS SNAPSHOT
Isatu Dang   2017 9:40 AM   Sleep Center Visit   MRN: [id number]    Department:  [facility]   Dept Phone:  [phone]    Description:  Female : 1950   Provider:  JEAN CLAUDE Coe           Reason for Visit     Apnea CPAP 11      Allergies as of 2017     Allergen Noted Reactions    Ace Inhibitors 10/20/2011       cough    Tape 2011   Contact Dermatitis      You were diagnosed with     Obstructive sleep apnea   [450622]       Nocturnal hypoxemia   [941918]       Mediastinal mass   [241625]       H/O: lung cancer   [460748]       Other type of follicular lymphoma, unspecified body region (CMS-HCC)   [9539500]       Restrictive lung disease   [223506]       Pulmonary hypertension (CMS-HCC)   [648654]       Combined systolic and diastolic congestive heart failure, unspecified congestive heart failure chronicity (CMS-HCC)   [1883968]       Hypoxemia   [799.02.ICD-9-CM]         Vital Signs     Blood Pressure Pulse Respirations Height Weight Body Mass Index    110/62 mmHg 67 16 1.524 m (5') 75.751 kg (167 lb) 32.62 kg/m2    Oxygen Saturation Smoking Status                93% Former Smoker          Basic Information     Date Of Birth Sex Race Ethnicity Preferred Language    1950 Female White Non- English      Your appointments     Sep 12, 2017  9:00 AM   PACER CHECK ONLY with PACER CHECK-CAM B   [facility] (--)    [address], [address]  [place] [address]   [phone]            Sep 12, 2017  9:40 AM   FOLLOW UP with Felix Alexis M.D.   [facility] (--)    [address], [address]  [place] [address]   [phone]              Problem List              ICD-10-CM Priority Class Noted - Resolved    Congestive heart failure (CHF) (CMS-HCC) I50.9   Unknown - Present    Depression F32.9   Unknown - Present    Type 2 diabetes mellitus without complication (CMS-HCC) E11.9    Unknown - Present    Dilated cardiomyopathy (CMS-HCC) I42.0   Unknown - Present    Hypercholesteremia E78.00   Unknown - Present    LBBB (left bundle branch block) I44.7   Unknown - Present    Obesity E66.9   Unknown - Present    Peripheral neuropathy G62.9   Unknown - Present    Renal cyst, acquired, left N28.1   Unknown - Present    Ventricular tachycardia (CMS-HCC) I47.2   Unknown - Present    Presence of biventricular AICD Z95.810 Medium  12/20/2011 - Present    Lymphoma (CMS-HCC) C85.90   8/17/2012 - Present    Mediastinal mass J98.59   8/17/2012 - Present    Insomnia G47.00   4/17/2013 - Present    Obstructive sleep apnea G47.33 Low  4/17/2013 - Present    Lung cancer (CMS-HCC) C34.90   7/16/2013 - Present    Acute systolic heart failure (CMS-HCC) I50.21 High  1/30/2016 - Present    Type 2 diabetes mellitus with diabetic polyneuropathy (CMS-HCC) E11.42 Low  1/31/2016 - Present    Essential hypertension I10 Medium  1/31/2016 - Present    Aortic regurgitation I35.1   5/27/2016 - Present    Nocturnal hypoxemia G47.34   7/26/2016 - Present    Restrictive lung disease J98.4   7/26/2016 - Present      Health Maintenance        Date Due Completion Dates    DIABETES MONOFILAMENT / LE EXAM 5/15/1949 ---    RETINAL SCREENING 11/15/1966 ---    IMM DTaP/Tdap/Td Vaccine (1 - Tdap) 11/15/1967 ---    PAP SMEAR 11/15/1969 ---    COLONOSCOPY 11/15/1998 ---    IMM ZOSTER VACCINE 11/15/2008 ---    IMM PNEUMOCOCCAL 65+ (ADULT) HIGH/HIGHEST RISK SERIES (1 of 2 - PCV13) 11/15/2013 ---    A1C SCREENING 7/18/2016 1/18/2016, 11/24/2015, 8/24/2015, 3/26/2014, 6/14/2011    FASTING LIPID PROFILE 1/18/2017 1/18/2016, 8/24/2015, 3/26/2014, 2/24/2012, 9/12/2011, 6/14/2011    URINE ACR / MICROALBUMIN 1/18/2017 1/18/2016, 11/24/2015    BONE DENSITY 3/5/2017 3/5/2012    IMM INFLUENZA (1) 9/1/2017 10/1/2015, 9/14/2011    MAMMOGRAM 10/26/2017 10/26/2016, 9/4/2015, 3/28/2014, 3/24/2014, 3/22/2013, 3/5/2012, 11/5/2010, 11/17/2008, 11/17/2008,  6/17/2008, 6/17/2008, 11/26/2007, 10/18/2007, 10/18/2007, 5/12/2006, 8/5/2004    SERUM CREATININE 2/6/2018 2/6/2017, 11/9/2016, 7/12/2016, 5/24/2016, 1/30/2016, 1/30/2016, 1/18/2016, 8/24/2015, 7/27/2015, 5/11/2015, 11/14/2014, 10/6/2014, 3/26/2014, 11/26/2013, 11/12/2013, 6/6/2013, 3/5/2013, 3/5/2013, 11/14/2012, 8/28/2012, 8/13/2012, 6/14/2012, 5/22/2012, 3/23/2012, 2/24/2012, 10/20/2011, 9/13/2011, 9/11/2011, 6/14/2011, 3/25/2008, 5/21/2007            Current Immunizations     Influenza TIV (IM) 10/1/2015, 9/14/2011 11:27 AM    Pneumococcal Vaccine (PCV7) Historical Data 10/1/2015    Pneumococcal Vaccine (UF)Historical Data 1/12/2009      Below and/or attached are the medications your provider expects you to take. Review all of your home medications and newly ordered medications with your provider and/or pharmacist. Follow medication instructions as directed by your provider and/or pharmacist. Please keep your medication list with you and share with your provider. Update the information when medications are discontinued, doses are changed, or new medications (including over-the-counter products) are added; and carry medication information at all times in the event of emergency situations     Allergies:  ACE INHIBITORS - (reactions not documented)     TAPE - Contact Dermatitis               Medications  Valid as of: July 19, 2017 - 10:26 AM    Generic Name Brand Name Tablet Size Instructions for use    Albuterol Sulfate (Aero Soln) albuterol 108 (90 BASE) MCG/ACT Inhale 2 Puffs by mouth every four hours as needed for Shortness of Breath (Wheezing).        Albuterol Sulfate (Aero Soln) albuterol 108 (90 BASE) MCG/ACT Inhale 2 Puffs by mouth every 6 hours as needed for Shortness of Breath.        Allopurinol (Tab) ZYLOPRIM 300 MG Take 300 mg by mouth 2 times a day.        Aspirin (Tab)  MG Take 325 mg by mouth every day.        Calcium Carbonate-Vitamin D (Tab) Calcium Carbonate-Vitamin D 600-200 MG-UNIT Take  2 Tabs by mouth every day.        Carvedilol (Tab) COREG 25 MG Take 1 Tab by mouth 2 times a day, with meals.        Citalopram Hydrobromide (Tab) CELEXA 20 MG TAKE ONE TABLET BY MOUTH ONCE DAILY        Cyanocobalamin   Place  under tongue every day.        Digoxin (Tab) LANOXIN 125 MCG Take 1 Tab by mouth every day.        Furosemide (Tab) LASIX 40 MG Take 1 Tab by mouth 2 Times a Day.        Gabapentin (Cap) NEURONTIN 300 MG Take 600 mg by mouth 3 times a day.        Losartan Potassium (Tab) COZAAR 50 MG Take 1 Tab by mouth every day.        MetFORMIN HCl (Tab) GLUCOPHAGE 1000 MG Take 1,000 mg by mouth 2 times a day, with meals.        Multiple Vitamin (Tab) MULTI-VITAMINS  Take 1 Tab by mouth every day.        Omega-3 Fatty Acids (Cap) Fish Oil 300 MG Take 1 Tab by mouth every day.        Potassium Chloride Crystal CR (Tab CR) K-DUR 10 MEQ Take 1 Tab by mouth every day.        Tiotropium Bromide Monohydrate (Aero Soln) Tiotropium Bromide Monohydrate 2.5 MCG/ACT Inhale 2 Inhalation by mouth every day. Assemble and prime.        Zolpidem Tartrate (Tab) AMBIEN 10 MG TAKE ONE TABLET BY MOUTH AT BEDTIME, as needed prn insomnia        .                 Medicines prescribed today were sent to:     [facility] - [address] [facility] [address]    Phone: [phone] Fax: [phone]    Open 24 Hours?: No      Medication refill instructions:       If your prescription bottle indicates you have medication refills left, it is not necessary to call your provider’s office. Please contact your pharmacy and they will refill your medication.    If your prescription bottle indicates you do not have any refills left, you may request refills at any time through one of the following ways: The online [employer] system (except Urgent Care), by calling your provider’s office, or by asking your pharmacy to contact your provider’s office with a refill request. Medication  refills are processed only during regular business hours and may not be available until the next business day. Your provider may request additional information or to have a follow-up visit with you prior to refilling your medication.   *Please Note: Medication refills are assigned a new Rx number when refilled electronically. Your pharmacy may indicate that no refills were authorized even though a new prescription for the same medication is available at the pharmacy. Please request the medicine by name with the pharmacy before contacting your provider for a refill.        Your To Do List     Future Labs/Procedures Complete By Expires    AMB PULMONARY FUNCTION TEST/LAB  8/18/2017 (Approximate) 7/19/2018    Comments:    In 1 month    AMB PULMONARY STRESS TESTING (6 MIN WALK)  8/18/2017 (Approximate) 7/19/2018    Comments:    In 1 month at follow up visit    OVERNIGHT OXIMETRY  8/18/2017 (Approximate) 7/19/2018    Comments:    Now on CPAP 11 CM with 2 LPM 02 bleed in      Instructions    Plan:      1) Continue CPAP at 11 CM H20 with 2 LPM 02 bleed in. CNOX now on her current setting to ensure adequate 02 saturations given her pulmonary hypertension noted on Echo.   2) Sleep hygiene discussed. Continue prn Ambien qhs.    3) Continue Spiriva 2.5 mcg respimat 2 puffs INH daily along with an Albuterol HFA inhaler 2 puffs q 4 hours as needed. Encouraged routine walking/exercise. Recommend updated PFT's given decline in FEV1 noted in March.   4) Continue to follow with Cardiology.  5) Continue to follow with Oncology. Request consult records from Dr. Rodríguez including recent CT imaging.    6) Continue 02 2 LPM prn exertion. Obtain 6 minute walk test to evaluate 02 requirements.   7) 1 month follow up at the Pulmonary office with results of her overnight oximetry with updated PFT's and a 6 minute walk test, sooner if needed.                        [employer] Access Code: Activation code not generated  Current [employer] Status:  Active

## 2017-08-18 ENCOUNTER — HOME STUDY (OUTPATIENT)
Dept: PULMONOLOGY | Facility: HOSPICE | Age: 67
End: 2017-08-18
Attending: NURSE PRACTITIONER
Payer: MEDICARE

## 2017-08-18 DIAGNOSIS — G47.33 OBSTRUCTIVE SLEEP APNEA: ICD-10-CM

## 2017-08-18 PROCEDURE — 94762 N-INVAS EAR/PLS OXIMTRY CONT: CPT | Performed by: INTERNAL MEDICINE

## 2017-08-18 NOTE — MR AVS SNAPSHOT
Isatu Dang   2017 2:00 PM   Appointment   MRN: [id number]    Department:  [facility]   Dept Phone:  [phone]    Description:  Female : 1950   Provider:  PULMONARY CLINIC           Allergies as of 2017     Allergen Noted Reactions    Ace Inhibitors 10/20/2011       cough    Tape 2011   Contact Dermatitis      You were diagnosed with     Obstructive sleep apnea   [956934]         Vital Signs     Smoking Status                   Former Smoker           Basic Information     Date Of Birth Sex Race Ethnicity Preferred Language    1950 Female White Non- English      Your appointments     Aug 18, 2017  2:00 PM   Overnight Oximetry with PULMONARY CLINIC   [facility] (--)    [address]  [address]  [place] [address]   [phone]            Aug 21, 2017 12:00 PM   Pulmonary Function Test with PFT-RM2   [facility] (--)    [address]  [address]  [place] [address]   [phone]            Aug 21, 2017  1:00 PM   Pulmonary Function Test with SPIROMETRY/6MW   [facility] (--)    [address]  [address]  [place] [address]   [phone]            Aug 21, 2017  2:00 PM   Established Patient Pul with JEAN CLAUDE Coe   [facility] (--)    [address]  [address]  [place] [address]   [phone]            Sep 01, 2017  6:00 AM   Adult Draw/Collection with LAB [place]   [facility] (--)    [address]  [place] [address]   [phone]            Sep 12, 2017  9:00 AM   PACER CHECK ONLY with PACER CHECK-CAM B   [facility] (--)    [address], [address]  [place] [address] [phone]            Sep 12, 2017  9:40 AM   FOLLOW UP with Felix Alexis M.D.   [facility] (--)    [address], [address]  [place] [address]   [phone]              Problem List              ICD-10-CM Priority Class Noted - Resolved    Congestive heart failure (CHF) (CMS-HCC) I50.9   Unknown - Present    Depression F32.9   Unknown - Present    Type 2 diabetes mellitus without complication (CMS-HCC) E11.9   Unknown - Present    Dilated cardiomyopathy (CMS-HCC) I42.0   Unknown - Present    Hypercholesteremia E78.00   Unknown - Present    LBBB (left bundle branch block) I44.7   Unknown - Present    Obesity E66.9   Unknown - Present    Peripheral neuropathy G62.9   Unknown - Present    Renal cyst, acquired, left N28.1   Unknown - Present    Ventricular tachycardia (CMS-HCC) I47.2   Unknown - Present    Presence of biventricular AICD Z95.810 Medium  12/20/2011 - Present    Lymphoma (CMS-HCC) C85.90   8/17/2012 - Present    Mediastinal mass J98.59   8/17/2012 - Present    Insomnia G47.00   4/17/2013 - Present    Obstructive sleep apnea G47.33 Low  4/17/2013 - Present    Lung cancer (CMS-HCC) C34.90   7/16/2013 - Present    Acute systolic heart failure (CMS-HCC) I50.21 High  1/30/2016 - Present    Type 2 diabetes mellitus with diabetic polyneuropathy (CMS-HCC) E11.42 Low  1/31/2016 - Present    Essential hypertension I10 Medium  1/31/2016 - Present    Aortic regurgitation I35.1   5/27/2016 - Present    Nocturnal hypoxemia G47.34   7/26/2016 - Present    Restrictive lung disease J98.4   7/26/2016 - Present      Health Maintenance        Date Due Completion Dates    DIABETES MONOFILAMENT / LE EXAM 5/15/1949 ---    RETINAL SCREENING 11/15/1966 ---    IMM DTaP/Tdap/Td Vaccine (1 - Tdap) 11/15/1967 ---    PAP SMEAR 11/15/1969 ---    COLONOSCOPY 11/15/1998 ---    IMM ZOSTER VACCINE 11/15/2008 ---    IMM PNEUMOCOCCAL 65+ (ADULT) HIGH/HIGHEST RISK SERIES (1 of 2 - PCV13) 11/15/2013 ---    A1C SCREENING 7/18/2016 1/18/2016, 11/24/2015, 8/24/2015, 3/26/2014, 6/14/2011    FASTING LIPID PROFILE 1/18/2017 1/18/2016, 8/24/2015, 3/26/2014, 2/24/2012, 9/12/2011, 6/14/2011    URINE ACR /  MICROALBUMIN 1/18/2017 1/18/2016, 11/24/2015    BONE DENSITY 3/5/2017 3/5/2012    IMM INFLUENZA (1) 9/1/2017 10/1/2015, 9/14/2011    MAMMOGRAM 10/26/2017 10/26/2016, 9/4/2015, 3/28/2014, 3/24/2014, 3/22/2013, 3/5/2012, 11/5/2010, 11/17/2008, 11/17/2008, 6/17/2008, 6/17/2008, 11/26/2007, 10/18/2007, 10/18/2007, 5/12/2006, 8/5/2004    SERUM CREATININE 2/6/2018 2/6/2017, 11/9/2016, 7/12/2016, 5/24/2016, 1/30/2016, 1/30/2016, 1/18/2016, 8/24/2015, 7/27/2015, 5/11/2015, 11/14/2014, 10/6/2014, 3/26/2014, 11/26/2013, 11/12/2013, 6/6/2013, 3/5/2013, 3/5/2013, 11/14/2012, 8/28/2012, 8/13/2012, 6/14/2012, 5/22/2012, 3/23/2012, 2/24/2012, 10/20/2011, 9/13/2011, 9/11/2011, 6/14/2011, 3/25/2008, 5/21/2007            Current Immunizations     Influenza TIV (IM) 10/1/2015, 9/14/2011 11:27 AM    Pneumococcal Vaccine (PCV7) Historical Data 10/1/2015    Pneumococcal Vaccine (UF)Historical Data 1/12/2009      Below and/or attached are the medications your provider expects you to take. Review all of your home medications and newly ordered medications with your provider and/or pharmacist. Follow medication instructions as directed by your provider and/or pharmacist. Please keep your medication list with you and share with your provider. Update the information when medications are discontinued, doses are changed, or new medications (including over-the-counter products) are added; and carry medication information at all times in the event of emergency situations     Allergies:  ACE INHIBITORS - (reactions not documented)     TAPE - Contact Dermatitis               Medications  Valid as of: August 18, 2017 -  1:53 PM    Generic Name Brand Name Tablet Size Instructions for use    Albuterol Sulfate (Aero Soln) albuterol 108 (90 Base) MCG/ACT Inhale 2 Puffs by mouth every four hours as needed for Shortness of Breath (Wheezing).        Albuterol Sulfate (Aero Soln) albuterol 108 (90 Base) MCG/ACT Inhale 2 Puffs by mouth every 6 hours as needed  for Shortness of Breath.        Allopurinol (Tab) ZYLOPRIM 300 MG Take 300 mg by mouth 2 times a day.        Aspirin (Tab)  MG Take 325 mg by mouth every day.        Calcium Carbonate-Vitamin D (Tab) Calcium Carbonate-Vitamin D 600-200 MG-UNIT Take 2 Tabs by mouth every day.        Carvedilol (Tab) COREG 25 MG Take 1 Tab by mouth 2 times a day, with meals.        Citalopram Hydrobromide (Tab) CELEXA 20 MG TAKE ONE TABLET BY MOUTH ONCE DAILY        Cyanocobalamin   Place  under tongue every day.        Digoxin (Tab) LANOXIN 125 MCG Take 1 Tab by mouth every day.        Furosemide (Tab) LASIX 40 MG Take 1 Tab by mouth 2 Times a Day.        Gabapentin (Cap) NEURONTIN 300 MG Take 600 mg by mouth 3 times a day.        Losartan Potassium (Tab) COZAAR 50 MG Take 1 Tab by mouth every day.        MetFORMIN HCl (Tab) GLUCOPHAGE 1000 MG Take 1,000 mg by mouth 2 times a day, with meals.        Multiple Vitamin (Tab) MULTI-VITAMINS  Take 1 Tab by mouth every day.        Omega-3 Fatty Acids (Cap) Fish Oil 300 MG Take 1 Tab by mouth every day.        Potassium Chloride Crystal CR (Tab CR) K-DUR 10 MEQ Take 1 Tab by mouth every day.        Tiotropium Bromide Monohydrate (Aero Soln) Tiotropium Bromide Monohydrate 2.5 MCG/ACT Inhale 2 Inhalation by mouth every day. Assemble and prime.        Zolpidem Tartrate (Tab) AMBIEN 10 MG TAKE ONE TABLET BY MOUTH AT BEDTIME, as needed prn insomnia        .                 Medicines prescribed today were sent to:     [facility] - [address] [facility] [address]    Phone: [phone] Fax: [phone]    Open 24 Hours?: No      Medication refill instructions:       If your prescription bottle indicates you have medication refills left, it is not necessary to call your provider’s office. Please contact your pharmacy and they will refill your medication.    If your prescription bottle indicates you do not have any refills  left, you may request refills at any time through one of the following ways: The online [employer] system (except Urgent Care), by calling your provider’s office, or by asking your pharmacy to contact your provider’s office with a refill request. Medication refills are processed only during regular business hours and may not be available until the next business day. Your provider may request additional information or to have a follow-up visit with you prior to refilling your medication.   *Please Note: Medication refills are assigned a new Rx number when refilled electronically. Your pharmacy may indicate that no refills were authorized even though a new prescription for the same medication is available at the pharmacy. Please request the medicine by name with the pharmacy before contacting your provider for a refill.           [employer] Access Code: Activation code not generated  Current [employer] Status: Active

## 2017-08-21 ENCOUNTER — NON-PROVIDER VISIT (OUTPATIENT)
Dept: PULMONOLOGY | Facility: HOSPICE | Age: 67
End: 2017-08-21
Attending: NURSE PRACTITIONER
Payer: MEDICARE

## 2017-08-21 ENCOUNTER — OFFICE VISIT (OUTPATIENT)
Dept: PULMONOLOGY | Facility: HOSPICE | Age: 67
End: 2017-08-21
Payer: MEDICARE

## 2017-08-21 ENCOUNTER — NON-PROVIDER VISIT (OUTPATIENT)
Dept: PULMONOLOGY | Facility: HOSPICE | Age: 67
End: 2017-08-21
Payer: MEDICARE

## 2017-08-21 VITALS
HEART RATE: 73 BPM | RESPIRATION RATE: 16 BRPM | SYSTOLIC BLOOD PRESSURE: 90 MMHG | DIASTOLIC BLOOD PRESSURE: 48 MMHG | OXYGEN SATURATION: 91 % | BODY MASS INDEX: 29.81 KG/M2 | HEIGHT: 62 IN | WEIGHT: 162 LBS

## 2017-08-21 DIAGNOSIS — J98.4 RESTRICTIVE LUNG DISEASE: ICD-10-CM

## 2017-08-21 DIAGNOSIS — Z85.118 H/O: LUNG CANCER: ICD-10-CM

## 2017-08-21 DIAGNOSIS — R09.02 HYPOXEMIA: ICD-10-CM

## 2017-08-21 DIAGNOSIS — I27.20 PULMONARY HYPERTENSION (HCC): ICD-10-CM

## 2017-08-21 DIAGNOSIS — J98.59 MEDIASTINAL MASS: ICD-10-CM

## 2017-08-21 DIAGNOSIS — C82.80 OTHER TYPE OF FOLLICULAR LYMPHOMA, UNSPECIFIED BODY REGION (HCC): ICD-10-CM

## 2017-08-21 DIAGNOSIS — I50.40 COMBINED SYSTOLIC AND DIASTOLIC CONGESTIVE HEART FAILURE, UNSPECIFIED CONGESTIVE HEART FAILURE CHRONICITY: ICD-10-CM

## 2017-08-21 DIAGNOSIS — G47.33 OBSTRUCTIVE SLEEP APNEA: ICD-10-CM

## 2017-08-21 PROCEDURE — 99214 OFFICE O/P EST MOD 30 MIN: CPT | Performed by: NURSE PRACTITIONER

## 2017-08-21 PROCEDURE — 94726 PLETHYSMOGRAPHY LUNG VOLUMES: CPT | Performed by: INTERNAL MEDICINE

## 2017-08-21 PROCEDURE — 94620 AMB PULMONARY STRESS TESTING (6 MIN WALK): CPT | Performed by: INTERNAL MEDICINE

## 2017-08-21 PROCEDURE — 94060 EVALUATION OF WHEEZING: CPT | Performed by: INTERNAL MEDICINE

## 2017-08-21 PROCEDURE — 94729 DIFFUSING CAPACITY: CPT | Performed by: INTERNAL MEDICINE

## 2017-08-21 ASSESSMENT — 6 MINUTE WALK TEST (6MWT)
NUMBER OF RESTS: 0
SAO2 AT 2 MIN: 93
HEART RATE AT 5 MIN: 114
PERCEIVED FATIGUE AT 2 MIN: 0
O2 SAT PERCENT ROOM AIR: 90
HEART RATE AT 2 MIN: 97
HEART RATE AT 6 MIN: 106
PERCEIVED BREATHLESSNESS AT 2 MIN: 2
BLOOD PRESSURE: LEFT ARM
PERCEIVED BREATHLESSNESS AT 6 MIN: 2
TOTAL REST TIME: 0
HEART RATE AT 4 MIN: 106
PERCEIVED BREATHLESSNESS AT 3 MIN: 2
SAO2 AT 5 MIN: 91
BLOOD PRESSURE AT 1 MIN: 122/68
PERCEIVED FATIGUE AT 4 MIN: 0
PERCEIVED FATIGUE AT 3 MIN: 0
PERCEIVED FATIGUE AT 5 MIN: 0
HEART RATE AT 2 MIN: 89
PERCEIVED BREATHLESSNESS AT 4 MIN: 2
SITTING BLOOD PRESSURE: 112/62
PERCENT OF NORMAL WALKED: 69
PERCEIVED BREATHLESSNESS AT 2 MIN: 1
HEART RATE AT 1 MIN: 98
HEART RATE AT 1 MIN: 92
PERCEIVED BREATHLESSNESS AT 1 MIN: 1
SAO2 AT 1 MIN: 91
PERCEIVED FATIGUE AT 2 MIN: 0
SAO2 AT 3 MIN: 91
PERCEIVED BREATHLESSNESS AT 5 MIN: 3
SAO2 AT 2 MIN: 93
PERCEIVED FATIGUE AT 6 MIN: 0
HEART RATE AT 3 MIN: 106
SAO2 AT 4 MIN: 90
PERCEIVED FATIGUE AT 1 MIN: 0
SAO2 AT 1 MIN: 91
HEART RATE: 79
SAO2 AT 6 MIN: 91
PERCEIVED BREATHLESSNESS AT 1 MIN: 1
PERCEIVED FATIGUE AT 1 MIN: 0

## 2017-08-21 ASSESSMENT — PULMONARY FUNCTION TESTS
FVC_PERCENT_PREDICTED: 43
FEV1/FVC_PERCENT_PREDICTED: 76
FVC_PERCENT_PREDICTED: 44
FEV1_PERCENT_PREDICTED: 46
FEV1_PERCENT_PREDICTED: 46
FEV1_PERCENT_CHANGE: -2
FEV1_PREDICTED: 2.21
FEV1: 1.04
FEV1/FVC_PERCENT_PREDICTED: 105
FEV1/FVC: 79
FVC_PREDICTED: 2.92
FVC: 1.31
FVC: 1.28
FEV1/FVC_PERCENT_CHANGE: 50
FEV1_PERCENT_CHANGE: -1
FEV1: 1.02
FEV1/FVC_PERCENT_PREDICTED: 107
FEV1/FVC: 79.69

## 2017-08-21 NOTE — PATIENT INSTRUCTIONS
Plan:    1) I reviewed her PFT's in detail with Dr. Malone. She does have evidence of restriction. She is followed routinely be Oncology. I do not have records of her recent chest imaging. She states she is scheduled to see him next month. We discussed obtaining a chest xray at today's office visit for further work up. She would like to hold off. Her 6 minute walk test indicated adequate 02 saturations. Her dyspnea is unchanged. Dr. Malone states her restriction may be related to CHF. We will request consult records from her Oncologist including any recent chest imaging. We will arrange 3 month follow up. However, sooner if symptoms warrant. He recommend repeating PFT's in 6 months. We will order this at her follow up visit.  2) Continue CPAP at 11 CM H20 with 2 LPM 02 bleed in. Overnight oximetry indicates adequate saturations.   3) Sleep hygiene discussed. Continue prn Ambien qhs.    4) Continue Spiriva 2.5 mcg respimat 2 puffs INH daily along with an Albuterol HFA inhaler 2 puffs q 4 hours as needed. Encouraged routine walking/exercise.   5) Continue to follow with Cardiology.  6) She has had a Pneumovax 23 in 2009. Recommend a Prevnar 13 vaccination and updated Influenza vaccine in the Fall. She states she will get this through her Pharmacy.   7) 3 month follow up with Oncology consult records and copies of recent chest imaging, sooner OV if needed.

## 2017-08-21 NOTE — PROGRESS NOTES
Chief Complaint   Patient presents with   • Apnea     11 CM H2O   • Hypoxemia       HPI:  Isatu Dang is a 66 y.o. year old female here today for follow-up on PFT's and 6 minute walk testing.     She has a history of mild ISMAEL. She is compliant on CPAP at 11 CM H20 with 2 LPM 02 bleed in. Her compliance card download at her last office visit indicated an AHI of 1.6 on a CPAP pressure of 11 CM with an average use of 7-8 hours at night. She did not bring her chip to today's office visit. She tolerates her CPAP pressure well. She uses Ambien at night as needed and feels this helps her to maintain sleep. She denies any morning headaches. She does feel more refreshed on therapy. Overnight oximetry was performed 8/21/2017 on CPAP of 11 CM H20 with 2 LPM 02 bleed in and indicates a mean 02 saturation of 92.8%.     She has a history of Follicular Lymphoma and Lung Cancer with a prior lung resection in 2012. She is followed by Oncology, Dr. Rodríguez. She had PFT's while she was in the hospital February 2016 for an exacerbation of her CHF. PFT's indicated evidence of restriction with TLC 75% which was similar to her 2013 PFT's. However, her DLCO was 64% predicted and in 2013 it was 90% predicted.    PFT's were updated 3/3/2017 and indicate an FEV1 of 0.96 L, 46% predicted with an FEV1/FVC ratio of 66 with a TLC of 74% predicted and a DLCO of 89% predicted. Her FEV1 has declined from her February 2016 PFT's in which her FEV1 was 1.32 L, 63% predicted. PFT's were updated today in the office and indicates an FEV1 of 1.04 L, 46% predicted with an FEV1/FVC ratio of 79 with a TLC of 55% and a DLCO of 83%. She is on 02 at 2 LPM prn exertion. 6 minute walk testing indicates adequate RA saturations of 90-93% with exertion.    She notes dyspnea with exertion. She states she has not been walking as much as she had in the past. She denies any cough or mucous. She denies any wheezing. She denies any fevers or chills. She was placed on  a trial of Spiriva respimat 2.5 mcg 2 puffs INH daily. She is using the Spiriva daily and feels it has helped some. She has used the Albuterol inhaler a couple times when she was traveling and more active. She denies any recent respiratory infections. She denies current lower extremity swelling.       CT chest, abdomen and pelvis 8/4/2016;  1.  Focal opacities in the right middle lobe have resolved when compared to previous exam. Small bilateral pleural-based nodules are again appreciated. The nodule in the posterior left lower lobe appears broader and flatter than it did on the previous   examination. This changes of uncertain significance.  2.  Slight increase in size of heterogeneous enhancing anterior superior mediastinal mass with current measurements of 3.2 x 3.6 cm.  3.  Hepatomegaly with fatty infiltration.  4.  Interval resolution of small bilateral pleural effusions.    She continue to follow with Cardiology for her CHF. She has a biventricular AICD.     She had an Echocardigram 6/2/2017 which indicated;  CONCLUSIONS  Prior echo 1-31-16.Normal left ventricular chamber size.  Mild left ventricular hypertrophy.  Left ventricular ejection fraction is visually estimated to be 35%.  Global hypokinesis with regional variation.  Grade IV diastolic dysfunction (restrictive pattern).  Pacer/ICD wire seen in right ventricle.  Pacer/ICD wire seen in right atrium.  Aortic sclerosis without stenosis.  Moderate aortic insufficiency.  Mitral annular calcification.  Moderate tricuspid regurgitation.  Estimated right ventricular systolic pressure  is 65 mmHg.  Right heart pressures are consistent with moderate pulmonary   hypertension.  Mild pulmonic insufficiency.  Normal pericardium without effusion.      Past Medical History   Diagnosis Date   • Joint replacement          • Neuropathy    • Dilated cardiomyopathy    • Hypercholesteremia    • Obesity    • Peripheral neuropathy    • Depression    • Unspecified hemorrhagic  conditions      from aspirin   • Renal cyst, acquired, left       History of benign cystic mass on the lower left kidney.   • Hypertension 1998     Pt states bp runs low on current meds   • Myocardial infarct (CMS-HCC) 1998   • Congestive heart failure (CHF) (CMS-HCC)    • Arrhythmia    • Cardiac arrest (CMS-HCC) 1998         • LBBB (left bundle branch block)    • Ventricular tachycardia (CMS-HCC)    • Pain 2011     Legs pain controled on meds   • Indigestion    • Sleep apnea    • Lung cancer (CMS-HCC) 2012         • Cancer (CMS-HCC) 2012     Lymphoma to neck   • CATARACT      Beginning stages no surgery yet   • Diabetes (CMS-HCC)    • Bowel habit changes      Diarrhea.   • Dental disorder      Upper/Lower dentures.       Past Surgical History   Procedure Laterality Date   • Hysterectomy, total abdominal  1995   • Implantable cardioverter defibrillator (icd)  4/1/98, 03/2007, 10/2011   • Carpal tunnel release Right 05/07         • Carpal tunnel release  4/3/08     Performed by DEEP GREEN at SURGERY [facility]   • Lipoma excision  4/3/08     Performed by DEEP GREEN at [facility]   • Recovery  10/21/2011     Performed by SURGERY, [facility]   • Hysterectomy, total abdominal           • Tonsillectomy and adenoidectomy           • Neck mass excision  5/24/2012     Performed by JUDSON WILLIS at SURGERY SAME DAY [facility]   • Bone marrow aspiration  6/22/2012     Performed by ISAIAS EVERETT at ENDOSCOPY [facility]   • Bone marrow biopsy, ndl/trocar  6/22/2012     Performed by ISAIAS EVERETT at ENDOSCOPY [facility]S   • Recovery  7/23/2012     Performed by SURGERY, IR-RECOVERY at SURGERY SAME DAY [facility]   • Thoracoscopy robotic  8/27/2012     Performed by GANSER, JOHN H at SURGERY [facility]   • Laryngoscopy  11/14/2013     Performed by Judson Willis M.D. at [facility]   • Biopsy general  11/14/2013      Performed by Judson Willis M.D. at SURGERY SAME DAY [facility] ORS   • Aicd battery change  November 2016     Generator replacement with Medtronic Viva S CRT-D [id number] implanted by Dr. Alexis. Original device implanted in 1998       Family History   Problem Relation Age of Onset   • Diabetes Mother    • Cancer Mother    • Diabetes Maternal Grandmother    • Diabetes Sister        Social History     Social History   • Marital Status:      Spouse Name: N/A   • Number of Children: N/A   • Years of Education: N/A     Occupational History   • Not on file.     Social History Main Topics   • Smoking status: Former Smoker -- 2.00 packs/day for 30 years     Types: Cigarettes     Quit date: 10/20/1998   • Smokeless tobacco: Never Used   • Alcohol Use: 0.0 - 1.2 oz/week     0-1 Glasses of wine, 0-1 Shots of liquor per week      Comment: occasional    • Drug Use: No   • Sexual Activity: Not Currently     Other Topics Concern   • Not on file     Social History Narrative       ROS:  Constitutional: Denies fevers, chills, sweats, fatigue, weight loss  Eyes: Denies glasses, vision loss, pain, drainage, double vision  Ears/Nose/Mouth/Throat: Denies rhinitis, nasal congestion, ear ache, difficulty hearing, sore throat, persistent hoarseness, decayed teeth/toothache  Cardiovascular: Denies chest pain, tightness, palpitations, swelling in feet/legs, fainting, difficulty breathing when laying down  Respiratory: Se HPI   GI: Denies heartburn, difficulty swallowing, nausea, vomiting, abdominal pain, diarrhea, constipation  : Denies frequent urination, painful urination  Integumentary: Denies rashes, lumps or color changes  MSK: Denies painful joints, sore muscles, and back pain.   Neurological: Denies frequent headaches, dizziness, weakness  Sleep: See HPI       Current Outpatient Prescriptions on File Prior to Visit   Medication Sig Dispense Refill   • zolpidem (AMBIEN) 10 MG Tab TAKE ONE TABLET BY MOUTH AT BEDTIME, as needed prn  "insomnia 90 Tab 1   • albuterol (PROAIR HFA) 108 (90 BASE) MCG/ACT Aero Soln inhalation aerosol Inhale 2 Puffs by mouth every 6 hours as needed for Shortness of Breath. 3 Inhaler 3   • carvedilol (COREG) 25 MG Tab Take 1 Tab by mouth 2 times a day, with meals. 180 Tab 3   • digoxin (LANOXIN) 125 MCG Tab Take 1 Tab by mouth every day. 90 Tab 3   • furosemide (LASIX) 40 MG Tab Take 1 Tab by mouth 2 Times a Day. 180 Tab 3   • losartan (COZAAR) 50 MG Tab Take 1 Tab by mouth every day. 90 Tab 3   • potassium chloride SA (K-DUR) 10 MEQ Tab CR Take 1 Tab by mouth every day. 90 Tab 3   • Tiotropium Bromide Monohydrate (SPIRIVA RESPIMAT) 2.5 MCG/ACT Aero Soln Inhale 2 Inhalation by mouth every day. Assemble and prime. 3 Inhaler 3   • albuterol (VENTOLIN HFA) 108 (90 BASE) MCG/ACT Aero Soln inhalation aerosol Inhale 2 Puffs by mouth every four hours as needed for Shortness of Breath (Wheezing). 1 Inhaler 6   • Cyanocobalamin (VITAMIN B-12 SL) Place  under tongue every day.     • citalopram (CELEXA) 20 MG Tab TAKE ONE TABLET BY MOUTH ONCE DAILY 90 Tab 3   • metformin (GLUCOPHAGE) 1000 MG tablet Take 1,000 mg by mouth 2 times a day, with meals.     • aspirin (ASA) 325 MG TABS Take 325 mg by mouth every day.     • Multiple Vitamin (MULTI-VITAMINS) TABS Take 1 Tab by mouth every day.     • Omega-3 Fatty Acids (FISH OIL) 300 MG CAPS Take 1 Tab by mouth every day.     • Calcium Carbonate-Vitamin D (CALCIUM 600+D) 600-200 MG-UNIT TABS Take 2 Tabs by mouth every day.     • allopurinol (ZYLOPRIM) 300 MG TABS Take 300 mg by mouth 2 times a day.     • gabapentin (NEURONTIN) 300 MG CAPS Take 600 mg by mouth 3 times a day.       No current facility-administered medications on file prior to visit.     Ace inhibitors and Tape    Blood pressure 90/48, pulse 73, resp. rate 16, height 1.575 m (5' 2\"), weight 73.483 kg (162 lb), SpO2 91 %.  PE:   Appearance: Well developed, well nourished, no acute distress  Eyes: PERRL, EOM intact, sclera " white, conjunctiva moist  Ears: no lesions or deformities  Hearing: grossly intact  Nose: no lesions or deformities  Oropharynx: tongue normal, posterior pharynx without erythema or exudate  Neck: supple, trachea midline, no masses   Respiratory effort: no intercostal retractions or use of accessory muscles  Lung auscultation: no rales, rhonchi or wheezes  Heart auscultation: no murmur rub or gallop  Extremities: no cyanosis or edema  Abdomen: soft ,non tender, no masses  Gait and Station: normal  Digits and nails: no clubbing, cyanosis, petechiae or nodes.  Cranial nerves: grossly intact  Skin: no rashes, lesions or ulcers noted  Orientation: Oriented to time, person and place  Mood and affect: mood and affect appropriate, normal interaction with examiner  Judgement: Intact          Assessment:  1. Obstructive sleep apnea     2. Hypoxemia     3. Mediastinal mass     4. H/O: lung cancer     5. Other type of follicular lymphoma, unspecified body region (CMS-HCC)     6. Restrictive lung disease     7. Pulmonary hypertension (CMS-HCC)     8. Combined systolic and diastolic congestive heart failure, unspecified congestive heart failure chronicity (CMS-HCC)           Plan:    1) I reviewed her PFT's in detail with Dr. Malone. She does have evidence of restriction. She is followed routinely be Oncology. I do not have records of her recent chest imaging. She states she is scheduled to see him next month. We discussed obtaining a chest xray at today's office visit for further work up. She would like to hold off. Her 6 minute walk test indicated adequate 02 saturations. Her dyspnea is unchanged. Dr. Malone states her restriction may be related to CHF. We will request consult records from her Oncologist including any recent chest imaging. We will arrange 3 month follow up. However, sooner if symptoms warrant. He recommend repeating PFT's in 6 months. We will order this at her follow up visit.  2) Continue CPAP at 11 CM H20 with 2  LPM 02 bleed in. Overnight oximetry indicates adequate saturations.   3) Sleep hygiene discussed. Continue prn Ambien qhs.    4) Continue Spiriva 2.5 mcg respimat 2 puffs INH daily along with an Albuterol HFA inhaler 2 puffs q 4 hours as needed. Encouraged routine walking/exercise.   5) Continue to follow with Cardiology.  6) She has had a Pneumovax 23 in 2009. Recommend a Prevnar 13 vaccination and updated Influenza vaccine in the Fall. She states she will get this through her Pharmacy.   7) 3 month follow up with Oncology consult records and copies of recent chest imaging, sooner OV if needed.

## 2017-08-21 NOTE — MR AVS SNAPSHOT
"Isatu Dang   2017 2:00 PM   Office Visit   MRN: [id number]    Department:  [facility]   Dept Phone:  [phone]    Description:  Female : 1950   Provider:  JEAN CLAUDE Coe           Reason for Visit     Apnea 11 CM H2O    Hypoxemia           Allergies as of 2017     Allergen Noted Reactions    Ace Inhibitors 10/20/2011       cough    Tape 2011   Contact Dermatitis      You were diagnosed with     Obstructive sleep apnea   [336999]       Hypoxemia   [799.02.ICD-9-CM]       Mediastinal mass   [194162]       H/O: lung cancer   [476462]       Other type of follicular lymphoma, unspecified body region (CMS-HCC)   [9970353]       Restrictive lung disease   [981448]       Pulmonary hypertension (CMS-HCC)   [303883]       Combined systolic and diastolic congestive heart failure, unspecified congestive heart failure chronicity (CMS-HCC)   [7511079]         Vital Signs     Blood Pressure Pulse Respirations Height Weight Body Mass Index    90/48 mmHg 73 16 1.575 m (5' 2\") 73.483 kg (162 lb) 29.62 kg/m2    Oxygen Saturation Smoking Status                91% Former Smoker          Basic Information     Date Of Birth Sex Race Ethnicity Preferred Language    1950 Female White Non- English      Your appointments     Sep 01, 2017  6:00 AM   Adult Draw/Collection with [facility]   [facility] (--)    [address]  [place] [address]   [phone]            Sep 12, 2017  9:00 AM   PACER CHECK ONLY with PACER [facility]   [facility] (--)    [address], [address]  [place] [id number] [phone]            Sep 12, 2017  9:40 AM   FOLLOW UP with Felix Alexis M.D.   [facility] (--)    [address], [address]  [place] [address] [phone]            2017  9:40 AM   Established Patient Pul with JEAN CLAUDE Coe   [facility] (--)    [address]  [address]  [place] [id number] [phone]              Problem List              ICD-10-CM Priority Class Noted - Resolved    Congestive heart failure (CHF) (CMS-HCC) I50.9   Unknown - Present    Depression F32.9   Unknown - Present    Type 2 diabetes mellitus without complication (CMS-HCC) E11.9   Unknown - Present    Dilated cardiomyopathy (CMS-HCC) I42.0   Unknown - Present    Hypercholesteremia E78.00   Unknown - Present    LBBB (left bundle branch block) I44.7   Unknown - Present    Obesity E66.9   Unknown - Present    Peripheral neuropathy G62.9   Unknown - Present    Renal cyst, acquired, left N28.1   Unknown - Present    Ventricular tachycardia (CMS-HCC) I47.2   Unknown - Present    Presence of biventricular AICD Z95.810 Medium  12/20/2011 - Present    Lymphoma (CMS-HCC) C85.90   8/17/2012 - Present    Mediastinal mass J98.59   8/17/2012 - Present    Insomnia G47.00   4/17/2013 - Present    Obstructive sleep apnea G47.33 Low  4/17/2013 - Present    Lung cancer (CMS-HCC) C34.90   7/16/2013 - Present    Acute systolic heart failure (CMS-HCC) I50.21 High  1/30/2016 - Present    Type 2 diabetes mellitus with diabetic polyneuropathy (CMS-HCC) E11.42 Low  1/31/2016 - Present    Essential hypertension I10 Medium  1/31/2016 - Present    Aortic regurgitation I35.1   5/27/2016 - Present    Nocturnal hypoxemia G47.34   7/26/2016 - Present    Restrictive lung disease J98.4   7/26/2016 - Present      Health Maintenance        Date Due Completion Dates    DIABETES MONOFILAMENT / LE EXAM 5/15/1949 ---    RETINAL SCREENING 11/15/1966 ---    IMM DTaP/Tdap/Td Vaccine (1 - Tdap) 11/15/1967 ---    PAP SMEAR 11/15/1969 ---    COLONOSCOPY 11/15/1998 ---    IMM ZOSTER VACCINE 11/15/2008 ---    IMM PNEUMOCOCCAL 65+ (ADULT) HIGH/HIGHEST RISK SERIES (1 of 2 - PCV13) 11/15/2013 ---    A1C SCREENING 7/18/2016 1/18/2016, 11/24/2015, 8/24/2015, 3/26/2014, 6/14/2011    FASTING LIPID PROFILE  1/18/2017 1/18/2016, 8/24/2015, 3/26/2014, 2/24/2012, 9/12/2011, 6/14/2011    URINE ACR / MICROALBUMIN 1/18/2017 1/18/2016, 11/24/2015    BONE DENSITY 3/5/2017 3/5/2012    IMM INFLUENZA (1) 9/1/2017 10/1/2015, 9/14/2011    MAMMOGRAM 10/26/2017 10/26/2016, 9/4/2015, 3/28/2014, 3/24/2014, 3/22/2013, 3/5/2012, 11/5/2010, 11/17/2008, 11/17/2008, 6/17/2008, 6/17/2008, 11/26/2007, 10/18/2007, 10/18/2007, 5/12/2006, 8/5/2004    SERUM CREATININE 2/6/2018 2/6/2017, 11/9/2016, 7/12/2016, 5/24/2016, 1/30/2016, 1/30/2016, 1/18/2016, 8/24/2015, 7/27/2015, 5/11/2015, 11/14/2014, 10/6/2014, 3/26/2014, 11/26/2013, 11/12/2013, 6/6/2013, 3/5/2013, 3/5/2013, 11/14/2012, 8/28/2012, 8/13/2012, 6/14/2012, 5/22/2012, 3/23/2012, 2/24/2012, 10/20/2011, 9/13/2011, 9/11/2011, 6/14/2011, 3/25/2008, 5/21/2007            Current Immunizations     Influenza TIV (IM) 10/1/2015, 9/14/2011 11:27 AM    Pneumococcal Vaccine (PCV7) Historical Data 10/1/2015    Pneumococcal Vaccine (UF)Historical Data 1/12/2009      Below and/or attached are the medications your provider expects you to take. Review all of your home medications and newly ordered medications with your provider and/or pharmacist. Follow medication instructions as directed by your provider and/or pharmacist. Please keep your medication list with you and share with your provider. Update the information when medications are discontinued, doses are changed, or new medications (including over-the-counter products) are added; and carry medication information at all times in the event of emergency situations     Allergies:  ACE INHIBITORS - (reactions not documented)     TAPE - Contact Dermatitis               Medications  Valid as of: August 21, 2017 -  2:50 PM    Generic Name Brand Name Tablet Size Instructions for use    Albuterol Sulfate (Aero Soln) albuterol 108 (90 Base) MCG/ACT Inhale 2 Puffs by mouth every four hours as needed for Shortness of Breath (Wheezing).        Albuterol Sulfate  (Aero Soln) albuterol 108 (90 Base) MCG/ACT Inhale 2 Puffs by mouth every 6 hours as needed for Shortness of Breath.        Allopurinol (Tab) ZYLOPRIM 300 MG Take 300 mg by mouth 2 times a day.        Aspirin (Tab)  MG Take 325 mg by mouth every day.        Calcium Carbonate-Vitamin D (Tab) Calcium Carbonate-Vitamin D 600-200 MG-UNIT Take 2 Tabs by mouth every day.        Carvedilol (Tab) COREG 25 MG Take 1 Tab by mouth 2 times a day, with meals.        Citalopram Hydrobromide (Tab) CELEXA 20 MG TAKE ONE TABLET BY MOUTH ONCE DAILY        Cyanocobalamin   Place  under tongue every day.        Digoxin (Tab) LANOXIN 125 MCG Take 1 Tab by mouth every day.        Furosemide (Tab) LASIX 40 MG Take 1 Tab by mouth 2 Times a Day.        Gabapentin (Cap) NEURONTIN 300 MG Take 600 mg by mouth 3 times a day.        Losartan Potassium (Tab) COZAAR 50 MG Take 1 Tab by mouth every day.        MetFORMIN HCl (Tab) GLUCOPHAGE 1000 MG Take 1,000 mg by mouth 2 times a day, with meals.        Multiple Vitamin (Tab) MULTI-VITAMINS  Take 1 Tab by mouth every day.        Omega-3 Fatty Acids (Cap) Fish Oil 300 MG Take 1 Tab by mouth every day.        Potassium Chloride Crystal CR (Tab CR) K-DUR 10 MEQ Take 1 Tab by mouth every day.        Tiotropium Bromide Monohydrate (Aero Soln) Tiotropium Bromide Monohydrate 2.5 MCG/ACT Inhale 2 Inhalation by mouth every day. Assemble and prime.        Zolpidem Tartrate (Tab) AMBIEN 10 MG TAKE ONE TABLET BY MOUTH AT BEDTIME, as needed prn insomnia        .                 Medicines prescribed today were sent to:     [email or web address] - [address] [facility] [address]    Phone: [phone] Fax: [phone]    Open 24 Hours?: No      Medication refill instructions:       If your prescription bottle indicates you have medication refills left, it is not necessary to call your provider’s office. Please contact your pharmacy and they will  refill your medication.    If your prescription bottle indicates you do not have any refills left, you may request refills at any time through one of the following ways: The online [employer] system (except Urgent Care), by calling your provider’s office, or by asking your pharmacy to contact your provider’s office with a refill request. Medication refills are processed only during regular business hours and may not be available until the next business day. Your provider may request additional information or to have a follow-up visit with you prior to refilling your medication.   *Please Note: Medication refills are assigned a new Rx number when refilled electronically. Your pharmacy may indicate that no refills were authorized even though a new prescription for the same medication is available at the pharmacy. Please request the medicine by name with the pharmacy before contacting your provider for a refill.           [employer] Access Code: Activation code not generated  Current [employer] Status: Active

## 2017-08-21 NOTE — MR AVS SNAPSHOT
Isatu Dang   2017 12:00 PM   Non-Provider Visit   MRN: [id number]    Department:  [facility]   Dept Phone:  [phone]    Description:  Female : 1950   Provider:  Santos Malone M.D.           Reason for Visit     Shortness of Breath           Allergies as of 2017     Allergen Noted Reactions    Ace Inhibitors 10/20/2011       cough    Tape 2011   Contact Dermatitis      You were diagnosed with     Restrictive lung disease   [896150]         Vital Signs     Smoking Status                   Former Smoker           Basic Information     Date Of Birth Sex Race Ethnicity Preferred Language    1950 Female White Non- English      Your appointments     Aug 21, 2017  1:00 PM   Pulmonary Function Test with SPIROMETRY/6MW   [facility] (--)    [address]  [address]  [place] [address]   [phone]            Aug 21, 2017  2:00 PM   Established Patient Pul with JEAN CLAUDE Coe   [facility] (--)    [address]  [address]  [place] [address]   [phone]            Sep 01, 2017  6:00 AM   Adult Draw/Collection with LAB [place]   [facility] [place] (--)    [address]  [place] [address]   [phone]            Sep 12, 2017  9:00 AM   PACER CHECK ONLY with PACER CHECK-CAM B   [facility] (--)    [address], [address]  [place] [address]   [phone]            Sep 12, 2017  9:40 AM   FOLLOW UP with Felix Alexis M.D.   [facility] (--)    [address], [address]  [place] [address]   [phone]              Problem List              ICD-10-CM Priority Class Noted - Resolved    Congestive heart failure (CHF) (CMS-HCC) I50.9   Unknown - Present    Depression F32.9   Unknown - Present    Type 2 diabetes mellitus without complication (CMS-[facility]) E11.9   Unknown - Present    Dilated cardiomyopathy  (CMS-HCC) I42.0   Unknown - Present    Hypercholesteremia E78.00   Unknown - Present    LBBB (left bundle branch block) I44.7   Unknown - Present    Obesity E66.9   Unknown - Present    Peripheral neuropathy G62.9   Unknown - Present    Renal cyst, acquired, left N28.1   Unknown - Present    Ventricular tachycardia (CMS-HCC) I47.2   Unknown - Present    Presence of biventricular AICD Z95.810 Medium  12/20/2011 - Present    Lymphoma (CMS-HCC) C85.90   8/17/2012 - Present    Mediastinal mass J98.59   8/17/2012 - Present    Insomnia G47.00   4/17/2013 - Present    Obstructive sleep apnea G47.33 Low  4/17/2013 - Present    Lung cancer (CMS-HCC) C34.90   7/16/2013 - Present    Acute systolic heart failure (CMS-HCC) I50.21 High  1/30/2016 - Present    Type 2 diabetes mellitus with diabetic polyneuropathy (CMS-HCC) E11.42 Low  1/31/2016 - Present    Essential hypertension I10 Medium  1/31/2016 - Present    Aortic regurgitation I35.1   5/27/2016 - Present    Nocturnal hypoxemia G47.34   7/26/2016 - Present    Restrictive lung disease J98.4   7/26/2016 - Present      Health Maintenance        Date Due Completion Dates    DIABETES MONOFILAMENT / LE EXAM 5/15/1949 ---    RETINAL SCREENING 11/15/1966 ---    IMM DTaP/Tdap/Td Vaccine (1 - Tdap) 11/15/1967 ---    PAP SMEAR 11/15/1969 ---    COLONOSCOPY 11/15/1998 ---    IMM ZOSTER VACCINE 11/15/2008 ---    IMM PNEUMOCOCCAL 65+ (ADULT) HIGH/HIGHEST RISK SERIES (1 of 2 - PCV13) 11/15/2013 ---    A1C SCREENING 7/18/2016 1/18/2016, 11/24/2015, 8/24/2015, 3/26/2014, 6/14/2011    FASTING LIPID PROFILE 1/18/2017 1/18/2016, 8/24/2015, 3/26/2014, 2/24/2012, 9/12/2011, 6/14/2011    URINE ACR / MICROALBUMIN 1/18/2017 1/18/2016, 11/24/2015    BONE DENSITY 3/5/2017 3/5/2012    IMM INFLUENZA (1) 9/1/2017 10/1/2015, 9/14/2011    MAMMOGRAM 10/26/2017 10/26/2016, 9/4/2015, 3/28/2014, 3/24/2014, 3/22/2013, 3/5/2012, 11/5/2010, 11/17/2008, 11/17/2008, 6/17/2008, 6/17/2008, 11/26/2007, 10/18/2007,  10/18/2007, 5/12/2006, 8/5/2004    SERUM CREATININE 2/6/2018 2/6/2017, 11/9/2016, 7/12/2016, 5/24/2016, 1/30/2016, 1/30/2016, 1/18/2016, 8/24/2015, 7/27/2015, 5/11/2015, 11/14/2014, 10/6/2014, 3/26/2014, 11/26/2013, 11/12/2013, 6/6/2013, 3/5/2013, 3/5/2013, 11/14/2012, 8/28/2012, 8/13/2012, 6/14/2012, 5/22/2012, 3/23/2012, 2/24/2012, 10/20/2011, 9/13/2011, 9/11/2011, 6/14/2011, 3/25/2008, 5/21/2007            Current Immunizations     Influenza TIV (IM) 10/1/2015, 9/14/2011 11:27 AM    Pneumococcal Vaccine (PCV7) Historical Data 10/1/2015    Pneumococcal Vaccine (UF)Historical Data 1/12/2009      Below and/or attached are the medications your provider expects you to take. Review all of your home medications and newly ordered medications with your provider and/or pharmacist. Follow medication instructions as directed by your provider and/or pharmacist. Please keep your medication list with you and share with your provider. Update the information when medications are discontinued, doses are changed, or new medications (including over-the-counter products) are added; and carry medication information at all times in the event of emergency situations     Allergies:  ACE INHIBITORS - (reactions not documented)     TAPE - Contact Dermatitis               Medications  Valid as of: August 21, 2017 - 12:25 PM    Generic Name Brand Name Tablet Size Instructions for use    Albuterol Sulfate (Aero Soln) albuterol 108 (90 Base) MCG/ACT Inhale 2 Puffs by mouth every four hours as needed for Shortness of Breath (Wheezing).        Albuterol Sulfate (Aero Soln) albuterol 108 (90 Base) MCG/ACT Inhale 2 Puffs by mouth every 6 hours as needed for Shortness of Breath.        Allopurinol (Tab) ZYLOPRIM 300 MG Take 300 mg by mouth 2 times a day.        Aspirin (Tab)  MG Take 325 mg by mouth every day.        Calcium Carbonate-Vitamin D (Tab) Calcium Carbonate-Vitamin D 600-200 MG-UNIT Take 2 Tabs by mouth every day.         Carvedilol (Tab) COREG 25 MG Take 1 Tab by mouth 2 times a day, with meals.        Citalopram Hydrobromide (Tab) CELEXA 20 MG TAKE ONE TABLET BY MOUTH ONCE DAILY        Cyanocobalamin   Place  under tongue every day.        Digoxin (Tab) LANOXIN 125 MCG Take 1 Tab by mouth every day.        Furosemide (Tab) LASIX 40 MG Take 1 Tab by mouth 2 Times a Day.        Gabapentin (Cap) NEURONTIN 300 MG Take 600 mg by mouth 3 times a day.        Losartan Potassium (Tab) COZAAR 50 MG Take 1 Tab by mouth every day.        MetFORMIN HCl (Tab) GLUCOPHAGE 1000 MG Take 1,000 mg by mouth 2 times a day, with meals.        Multiple Vitamin (Tab) MULTI-VITAMINS  Take 1 Tab by mouth every day.        Omega-3 Fatty Acids (Cap) Fish Oil 300 MG Take 1 Tab by mouth every day.        Potassium Chloride Crystal CR (Tab CR) K-DUR 10 MEQ Take 1 Tab by mouth every day.        Tiotropium Bromide Monohydrate (Aero Soln) Tiotropium Bromide Monohydrate 2.5 MCG/ACT Inhale 2 Inhalation by mouth every day. Assemble and prime.        Zolpidem Tartrate (Tab) AMBIEN 10 MG TAKE ONE TABLET BY MOUTH AT BEDTIME, as needed prn insomnia        .                 Medicines prescribed today were sent to:     [facility] - [address] [facility] [address]    Phone: [phone] Fax: [phone]    Open 24 Hours?: No      Medication refill instructions:       If your prescription bottle indicates you have medication refills left, it is not necessary to call your provider’s office. Please contact your pharmacy and they will refill your medication.    If your prescription bottle indicates you do not have any refills left, you may request refills at any time through one of the following ways: The online VISup system (except Urgent Care), by calling your provider’s office, or by asking your pharmacy to contact your provider’s office with a refill request. Medication refills are processed only during  regular business hours and may not be available until the next business day. Your provider may request additional information or to have a follow-up visit with you prior to refilling your medication.   *Please Note: Medication refills are assigned a new Rx number when refilled electronically. Your pharmacy may indicate that no refills were authorized even though a new prescription for the same medication is available at the pharmacy. Please request the medicine by name with the pharmacy before contacting your provider for a refill.           [employer] Access Code: Activation code not generated  Current [employer] Status: Active

## 2017-08-21 NOTE — PROCEDURES
Test on Room Air. Total distance walked 1000 ft.    The patient's baseline oxygen saturation was 90% on room air. With walking there was no evidence of oxygen desaturation. The patient was able to walk for 69% of predicted distance.

## 2017-08-21 NOTE — PROCEDURES
Good patient effort & cooperation.  The results of this test meet the ATS/ERS standards for acceptability and repeatability.  A bronchodilator of Ventolin HFA- 2puffs via spacer were administered.  The DLCO was uncorrected for Hgb.  Patient had difficulty with pleth test.    FEV1 is 1.04 L which is 46% of predicted. FEV1 FVC ratio is normal at 79%. MVV is reduced at 54% of predicted.  There is no response to an inhaled bronchodilator.  Total lung capacity is 55% of predicted.  DLCO is 83% of predicted.    The study does show the presence of a restrictive process. The relative preservation of DLCO suggests the possibility of congestive heart failure. Clinical correlation will be necessary for that diagnosis.

## 2017-08-21 NOTE — MR AVS SNAPSHOT
Isatu Dang   2017 1:00 PM   Non-Provider Visit   MRN: [id number]    Department:  [facility]   Dept Phone:  [phone]    Description:  Female : 1950   Provider:  SPIROMETRY/6MW           Reason for Visit     Hypoxemia           Allergies as of 2017     Allergen Noted Reactions    Ace Inhibitors 10/20/2011       cough    Tape 2011   Contact Dermatitis      You were diagnosed with     Hypoxemia   [799.02.ICD-9-CM]         Vital Signs     Smoking Status                   Former Smoker           Basic Information     Date Of Birth Sex Race Ethnicity Preferred Language    1950 Female White Non- English      Your appointments     Aug 21, 2017  1:00 PM   Pulmonary Function Test with SPIROMETRY/6MW   [facility] (--)    [address]  [address]  [place] [address]   [phone]            Aug 21, 2017  2:00 PM   Established Patient Pul with JEAN CLAUDE Coe   [facility] (--)    [address]  [address]  [place] [address]   [phone]            Sep 01, 2017  6:00 AM   Adult Draw/Collection with LAB [place]   [facility] [place] (--)    [address]  [place] [address]   [phone]            Sep 12, 2017  9:00 AM   PACER CHECK ONLY with PACER CHECK-CAM B   [facility] (--)    [address], [address]  [place] [address]   [phone]            Sep 12, 2017  9:40 AM   FOLLOW UP with Felix Alexis M.D.   [facility] (--)    [address], [address]  [place] [address]   [phone]              Problem List              ICD-10-CM Priority Class Noted - Resolved    Congestive heart failure (CHF) (CMS-[facility]) I50.9   Unknown - Present    Depression F32.9   Unknown - Present    Type 2 diabetes mellitus without complication (CMS-HCC) E11.9   Unknown - Present    Dilated cardiomyopathy (CMS-HCC) I42.0    Unknown - Present    Hypercholesteremia E78.00   Unknown - Present    LBBB (left bundle branch block) I44.7   Unknown - Present    Obesity E66.9   Unknown - Present    Peripheral neuropathy G62.9   Unknown - Present    Renal cyst, acquired, left N28.1   Unknown - Present    Ventricular tachycardia (CMS-HCC) I47.2   Unknown - Present    Presence of biventricular AICD Z95.810 Medium  12/20/2011 - Present    Lymphoma (CMS-HCC) C85.90   8/17/2012 - Present    Mediastinal mass J98.59   8/17/2012 - Present    Insomnia G47.00   4/17/2013 - Present    Obstructive sleep apnea G47.33 Low  4/17/2013 - Present    Lung cancer (CMS-HCC) C34.90   7/16/2013 - Present    Acute systolic heart failure (CMS-HCC) I50.21 High  1/30/2016 - Present    Type 2 diabetes mellitus with diabetic polyneuropathy (CMS-HCC) E11.42 Low  1/31/2016 - Present    Essential hypertension I10 Medium  1/31/2016 - Present    Aortic regurgitation I35.1   5/27/2016 - Present    Nocturnal hypoxemia G47.34   7/26/2016 - Present    Restrictive lung disease J98.4   7/26/2016 - Present      Health Maintenance        Date Due Completion Dates    DIABETES MONOFILAMENT / LE EXAM 5/15/1949 ---    RETINAL SCREENING 11/15/1966 ---    IMM DTaP/Tdap/Td Vaccine (1 - Tdap) 11/15/1967 ---    PAP SMEAR 11/15/1969 ---    COLONOSCOPY 11/15/1998 ---    IMM ZOSTER VACCINE 11/15/2008 ---    IMM PNEUMOCOCCAL 65+ (ADULT) HIGH/HIGHEST RISK SERIES (1 of 2 - PCV13) 11/15/2013 ---    A1C SCREENING 7/18/2016 1/18/2016, 11/24/2015, 8/24/2015, 3/26/2014, 6/14/2011    FASTING LIPID PROFILE 1/18/2017 1/18/2016, 8/24/2015, 3/26/2014, 2/24/2012, 9/12/2011, 6/14/2011    URINE ACR / MICROALBUMIN 1/18/2017 1/18/2016, 11/24/2015    BONE DENSITY 3/5/2017 3/5/2012    IMM INFLUENZA (1) 9/1/2017 10/1/2015, 9/14/2011    MAMMOGRAM 10/26/2017 10/26/2016, 9/4/2015, 3/28/2014, 3/24/2014, 3/22/2013, 3/5/2012, 11/5/2010, 11/17/2008, 11/17/2008, 6/17/2008, 6/17/2008, 11/26/2007, 10/18/2007, 10/18/2007,  5/12/2006, 8/5/2004    SERUM CREATININE 2/6/2018 2/6/2017, 11/9/2016, 7/12/2016, 5/24/2016, 1/30/2016, 1/30/2016, 1/18/2016, 8/24/2015, 7/27/2015, 5/11/2015, 11/14/2014, 10/6/2014, 3/26/2014, 11/26/2013, 11/12/2013, 6/6/2013, 3/5/2013, 3/5/2013, 11/14/2012, 8/28/2012, 8/13/2012, 6/14/2012, 5/22/2012, 3/23/2012, 2/24/2012, 10/20/2011, 9/13/2011, 9/11/2011, 6/14/2011, 3/25/2008, 5/21/2007            Current Immunizations     Influenza TIV (IM) 10/1/2015, 9/14/2011 11:27 AM    Pneumococcal Vaccine (PCV7) Historical Data 10/1/2015    Pneumococcal Vaccine (UF)Historical Data 1/12/2009      Below and/or attached are the medications your provider expects you to take. Review all of your home medications and newly ordered medications with your provider and/or pharmacist. Follow medication instructions as directed by your provider and/or pharmacist. Please keep your medication list with you and share with your provider. Update the information when medications are discontinued, doses are changed, or new medications (including over-the-counter products) are added; and carry medication information at all times in the event of emergency situations     Allergies:  ACE INHIBITORS - (reactions not documented)     TAPE - Contact Dermatitis               Medications  Valid as of: August 21, 2017 - 12:26 PM    Generic Name Brand Name Tablet Size Instructions for use    Albuterol Sulfate (Aero Soln) albuterol 108 (90 Base) MCG/ACT Inhale 2 Puffs by mouth every four hours as needed for Shortness of Breath (Wheezing).        Albuterol Sulfate (Aero Soln) albuterol 108 (90 Base) MCG/ACT Inhale 2 Puffs by mouth every 6 hours as needed for Shortness of Breath.        Allopurinol (Tab) ZYLOPRIM 300 MG Take 300 mg by mouth 2 times a day.        Aspirin (Tab)  MG Take 325 mg by mouth every day.        Calcium Carbonate-Vitamin D (Tab) Calcium Carbonate-Vitamin D 600-200 MG-UNIT Take 2 Tabs by mouth every day.        Carvedilol (Tab)  COREG 25 MG Take 1 Tab by mouth 2 times a day, with meals.        Citalopram Hydrobromide (Tab) CELEXA 20 MG TAKE ONE TABLET BY MOUTH ONCE DAILY        Cyanocobalamin   Place  under tongue every day.        Digoxin (Tab) LANOXIN 125 MCG Take 1 Tab by mouth every day.        Furosemide (Tab) LASIX 40 MG Take 1 Tab by mouth 2 Times a Day.        Gabapentin (Cap) NEURONTIN 300 MG Take 600 mg by mouth 3 times a day.        Losartan Potassium (Tab) COZAAR 50 MG Take 1 Tab by mouth every day.        MetFORMIN HCl (Tab) GLUCOPHAGE 1000 MG Take 1,000 mg by mouth 2 times a day, with meals.        Multiple Vitamin (Tab) MULTI-VITAMINS  Take 1 Tab by mouth every day.        Omega-3 Fatty Acids (Cap) Fish Oil 300 MG Take 1 Tab by mouth every day.        Potassium Chloride Crystal CR (Tab CR) K-DUR 10 MEQ Take 1 Tab by mouth every day.        Tiotropium Bromide Monohydrate (Aero Soln) Tiotropium Bromide Monohydrate 2.5 MCG/ACT Inhale 2 Inhalation by mouth every day. Assemble and prime.        Zolpidem Tartrate (Tab) AMBIEN 10 MG TAKE ONE TABLET BY MOUTH AT BEDTIME, as needed prn insomnia        .                 Medicines prescribed today were sent to:     [facility] - [address] [facility] [place] [address]    Phone: [phone] Fax: [phone]    Open 24 Hours?: No      Medication refill instructions:       If your prescription bottle indicates you have medication refills left, it is not necessary to call your provider’s office. Please contact your pharmacy and they will refill your medication.    If your prescription bottle indicates you do not have any refills left, you may request refills at any time through one of the following ways: The online CellPly system (except Urgent Care), by calling your provider’s office, or by asking your pharmacy to contact your provider’s office with a refill request. Medication refills are processed only during regular business  hours and may not be available until the next business day. Your provider may request additional information or to have a follow-up visit with you prior to refilling your medication.   *Please Note: Medication refills are assigned a new Rx number when refilled electronically. Your pharmacy may indicate that no refills were authorized even though a new prescription for the same medication is available at the pharmacy. Please request the medicine by name with the pharmacy before contacting your provider for a refill.           [employer] Access Code: Activation code not generated  Current [employer] Status: Active

## 2017-08-22 NOTE — PROCEDURES
The patient was studied with continuous pulse oximetry while utilizing CPAP at 11 cm of water pressure. No evidence of any significant oxygen desaturation occurred during the study.

## 2017-09-01 ENCOUNTER — HOSPITAL ENCOUNTER (OUTPATIENT)
Dept: LAB | Facility: MEDICAL CENTER | Age: 67
End: 2017-09-01
Attending: INTERNAL MEDICINE
Payer: MEDICARE

## 2017-09-01 DIAGNOSIS — I50.20 CHF (CONGESTIVE HEART FAILURE), NYHA CLASS III, UNSPECIFIED FAILURE CHRONICITY, SYSTOLIC (HCC): ICD-10-CM

## 2017-09-01 LAB
ALBUMIN SERPL BCP-MCNC: 4.2 G/DL (ref 3.2–4.9)
ALBUMIN SERPL BCP-MCNC: 4.2 G/DL (ref 3.2–4.9)
ALBUMIN/GLOB SERPL: 1.5 G/DL
ALBUMIN/GLOB SERPL: 1.6 G/DL
ALP SERPL-CCNC: 61 U/L (ref 30–99)
ALP SERPL-CCNC: 65 U/L (ref 30–99)
ALT SERPL-CCNC: 14 U/L (ref 2–50)
ALT SERPL-CCNC: 17 U/L (ref 2–50)
ANION GAP SERPL CALC-SCNC: 10 MMOL/L (ref 0–11.9)
ANION GAP SERPL CALC-SCNC: 9 MMOL/L (ref 0–11.9)
AST SERPL-CCNC: 26 U/L (ref 12–45)
AST SERPL-CCNC: 27 U/L (ref 12–45)
BASOPHILS # BLD AUTO: 0.8 % (ref 0–1.8)
BASOPHILS # BLD: 0.07 K/UL (ref 0–0.12)
BILIRUB SERPL-MCNC: 0.5 MG/DL (ref 0.1–1.5)
BILIRUB SERPL-MCNC: 0.5 MG/DL (ref 0.1–1.5)
BNP SERPL-MCNC: 300 PG/ML (ref 0–100)
BUN SERPL-MCNC: 16 MG/DL (ref 8–22)
BUN SERPL-MCNC: 16 MG/DL (ref 8–22)
CALCIUM SERPL-MCNC: 10 MG/DL (ref 8.5–10.5)
CALCIUM SERPL-MCNC: 9.9 MG/DL (ref 8.5–10.5)
CHLORIDE SERPL-SCNC: 102 MMOL/L (ref 96–112)
CHLORIDE SERPL-SCNC: 102 MMOL/L (ref 96–112)
CO2 SERPL-SCNC: 28 MMOL/L (ref 20–33)
CO2 SERPL-SCNC: 28 MMOL/L (ref 20–33)
CREAT SERPL-MCNC: 0.67 MG/DL (ref 0.5–1.4)
CREAT SERPL-MCNC: 0.69 MG/DL (ref 0.5–1.4)
EOSINOPHIL # BLD AUTO: 0.73 K/UL (ref 0–0.51)
EOSINOPHIL NFR BLD: 8.4 % (ref 0–6.9)
ERYTHROCYTE [DISTWIDTH] IN BLOOD BY AUTOMATED COUNT: 45.5 FL (ref 35.9–50)
GFR SERPL CREATININE-BSD FRML MDRD: >60 ML/MIN/1.73 M 2
GFR SERPL CREATININE-BSD FRML MDRD: >60 ML/MIN/1.73 M 2
GLOBULIN SER CALC-MCNC: 2.7 G/DL (ref 1.9–3.5)
GLOBULIN SER CALC-MCNC: 2.8 G/DL (ref 1.9–3.5)
GLUCOSE SERPL-MCNC: 102 MG/DL (ref 65–99)
GLUCOSE SERPL-MCNC: 104 MG/DL (ref 65–99)
HCT VFR BLD AUTO: 43.6 % (ref 37–47)
HGB BLD-MCNC: 15 G/DL (ref 12–16)
IMM GRANULOCYTES # BLD AUTO: 0.05 K/UL (ref 0–0.11)
IMM GRANULOCYTES NFR BLD AUTO: 0.6 % (ref 0–0.9)
LDH SERPL-CCNC: 214 U/L (ref 107–266)
LYMPHOCYTES # BLD AUTO: 1.72 K/UL (ref 1–4.8)
LYMPHOCYTES NFR BLD: 19.7 % (ref 22–41)
MCH RBC QN AUTO: 31.7 PG (ref 27–33)
MCHC RBC AUTO-ENTMCNC: 34.4 G/DL (ref 33.6–35)
MCV RBC AUTO: 92.2 FL (ref 81.4–97.8)
MONOCYTES # BLD AUTO: 0.63 K/UL (ref 0–0.85)
MONOCYTES NFR BLD AUTO: 7.2 % (ref 0–13.4)
NEUTROPHILS # BLD AUTO: 5.53 K/UL (ref 2–7.15)
NEUTROPHILS NFR BLD: 63.3 % (ref 44–72)
NRBC # BLD AUTO: 0 K/UL
NRBC BLD AUTO-RTO: 0 /100 WBC
PLATELET # BLD AUTO: 224 K/UL (ref 164–446)
PMV BLD AUTO: 11.4 FL (ref 9–12.9)
POTASSIUM SERPL-SCNC: 4.2 MMOL/L (ref 3.6–5.5)
POTASSIUM SERPL-SCNC: 4.3 MMOL/L (ref 3.6–5.5)
PROT SERPL-MCNC: 6.9 G/DL (ref 6–8.2)
PROT SERPL-MCNC: 7 G/DL (ref 6–8.2)
RBC # BLD AUTO: 4.73 M/UL (ref 4.2–5.4)
SODIUM SERPL-SCNC: 139 MMOL/L (ref 135–145)
SODIUM SERPL-SCNC: 140 MMOL/L (ref 135–145)
WBC # BLD AUTO: 8.7 K/UL (ref 4.8–10.8)

## 2017-09-01 PROCEDURE — 83615 LACTATE (LD) (LDH) ENZYME: CPT

## 2017-09-01 PROCEDURE — 85025 COMPLETE CBC W/AUTO DIFF WBC: CPT

## 2017-09-01 PROCEDURE — 80053 COMPREHEN METABOLIC PANEL: CPT

## 2017-09-05 ENCOUNTER — TELEPHONE (OUTPATIENT)
Dept: CARDIOLOGY | Facility: MEDICAL CENTER | Age: 67
End: 2017-09-05

## 2017-09-05 DIAGNOSIS — I50.9 CONGESTIVE HEART FAILURE, UNSPECIFIED CONGESTIVE HEART FAILURE CHRONICITY, UNSPECIFIED CONGESTIVE HEART FAILURE TYPE: ICD-10-CM

## 2017-09-05 NOTE — TELEPHONE ENCOUNTER
Notes Recorded by Ketruah Lemos R.N. on 9/5/2017 at 3:18 PM PDT  Pt notified by phone. Referral placed.  ------    Notes Recorded by Felix Alexis M.D. on 9/2/2017 at 10:55 AM PDT  Please refer to [facility] clinic  ------    Notes Recorded by Amelia Heck R.N. on 9/1/2017 at 4:30 PM PDT  Next appt:  09/12/2017 at 09:00 AM in Cardiology (PACER CHECK-CAM B) Dx:  CHF (congestive

## 2017-09-12 ENCOUNTER — NON-PROVIDER VISIT (OUTPATIENT)
Dept: CARDIOLOGY | Facility: MEDICAL CENTER | Age: 67
End: 2017-09-12
Payer: MEDICARE

## 2017-09-12 ENCOUNTER — OFFICE VISIT (OUTPATIENT)
Dept: CARDIOLOGY | Facility: MEDICAL CENTER | Age: 67
End: 2017-09-12
Payer: MEDICARE

## 2017-09-12 VITALS
DIASTOLIC BLOOD PRESSURE: 74 MMHG | HEIGHT: 62 IN | BODY MASS INDEX: 29.81 KG/M2 | OXYGEN SATURATION: 94 % | WEIGHT: 162 LBS | SYSTOLIC BLOOD PRESSURE: 116 MMHG | HEART RATE: 80 BPM

## 2017-09-12 DIAGNOSIS — I47.20 VENTRICULAR TACHYCARDIA (HCC): ICD-10-CM

## 2017-09-12 DIAGNOSIS — I42.0 DILATED CARDIOMYOPATHY (HCC): ICD-10-CM

## 2017-09-12 DIAGNOSIS — Z95.810 PRESENCE OF BIVENTRICULAR AICD: ICD-10-CM

## 2017-09-12 DIAGNOSIS — I44.7 LBBB (LEFT BUNDLE BRANCH BLOCK): ICD-10-CM

## 2017-09-12 DIAGNOSIS — I10 ESSENTIAL HYPERTENSION: ICD-10-CM

## 2017-09-12 PROCEDURE — 93284 PRGRMG EVAL IMPLANTABLE DFB: CPT | Performed by: INTERNAL MEDICINE

## 2017-09-12 PROCEDURE — 99214 OFFICE O/P EST MOD 30 MIN: CPT | Mod: 25 | Performed by: INTERNAL MEDICINE

## 2017-09-12 NOTE — PROGRESS NOTES
Subjective:   Isatu Dang is a 66 y.o. female who presents today with NICM with mild to moderate CHF. No chest  Pain. Mild edema. Not great about salt and H20 restriction. No shocks. LBBB with BIV device.    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   • CATARACT     Beginning stages no surgery yet   • Congestive heart failure (CHF) (CMS-HCC)    • Dental disorder     Upper/Lower dentures.   • Depression    • Diabetes (CMS-HCC)    • Dilated cardiomyopathy    • Hypercholesteremia    • Indigestion    • Joint replacement         • LBBB (left bundle branch block)    • Neuropathy    • Obesity    • Peripheral neuropathy    • Renal cyst, acquired, left      History of benign cystic mass on the lower left kidney.   • Sleep apnea    • Unspecified hemorrhagic conditions     from aspirin   • Ventricular tachycardia (CMS-HCC)      Past Surgical History:   Procedure Laterality Date   • AICD BATTERY CHANGE  November 2016    Generator replacement with Medtronic Viva S CRT-D [id number] implanted by Dr. Alexis. Original device implanted in 1998   • LARYNGOSCOPY  11/14/2013    Performed by Judson Willis M.D. at SURGERY SAME DAY [facility]   • BIOPSY GENERAL  11/14/2013    Performed by Judson Willis M.D. at SURGERY SAME DAY [facility]   • THORACOSCOPY ROBOTIC  8/27/2012    Performed by GANSER, JOHN H at SURGERY [facility] ORS   • RECOVERY  7/23/2012    Performed by SURGERY, IR-RECOVERY at SURGERY SAME DAY [facility] ORS   • BONE MARROW ASPIRATION  6/22/2012    Performed by ISAIAS EVERETT at ENDOSCOPY [facility] ORS   • BONE MARROW BIOPSY, NDL/TROCAR  6/22/2012    Performed by ISAIAS EVERETT at  ENDOSCOPY [facility] ORS   • NECK MASS EXCISION  5/24/2012    Performed by LAURA RAMIRES at SURGERY SAME DAY [facility] ORS   • RECOVERY  10/21/2011    Performed by MIKE SOTO at SURGERY SAME DAY [facility] ORS   • CARPAL TUNNEL RELEASE  4/3/08    Performed by DEEP GREEN at SURGERY [facility] ORS   • LIPOMA EXCISION  4/3/08    Performed by DEEP GREEN at SURGERY [facility] ORS   • CARPAL TUNNEL RELEASE Right 05/07        • HYSTERECTOMY, TOTAL ABDOMINAL  1995   • HYSTERECTOMY, TOTAL ABDOMINAL          • IMPLANTABLE CARDIOVERTER DEFIBRILLATOR (ICD)  4/1/98, 03/2007, 10/2011   • TONSILLECTOMY AND ADENOIDECTOMY            Family History   Problem Relation Age of Onset   • Diabetes Mother    • Cancer Mother    • Diabetes Sister    • Diabetes Maternal Grandmother      History   Smoking Status   • Former Smoker   • Packs/day: 2.00   • Years: 30.00   • Types: Cigarettes   • Quit date: 10/20/1998   Smokeless Tobacco   • Never Used     Allergies   Allergen Reactions   • Ace Inhibitors      cough   • Tape Contact Dermatitis     Outpatient Encounter Prescriptions as of 9/12/2017   Medication Sig Dispense Refill   • zolpidem (AMBIEN) 10 MG Tab TAKE ONE TABLET BY MOUTH AT BEDTIME, as needed prn insomnia 90 Tab 1   • albuterol (PROAIR HFA) 108 (90 BASE) MCG/ACT Aero Soln inhalation aerosol Inhale 2 Puffs by mouth every 6 hours as needed for Shortness of Breath. 3 Inhaler 3   • carvedilol (COREG) 25 MG Tab Take 1 Tab by mouth 2 times a day, with meals. 180 Tab 3   • digoxin (LANOXIN) 125 MCG Tab Take 1 Tab by mouth every day. 90 Tab 3   • furosemide (LASIX) 40 MG Tab Take 1 Tab by mouth 2 Times a Day. 180 Tab 3   • losartan (COZAAR) 50 MG Tab Take 1 Tab by mouth every day. 90 Tab 3   • potassium chloride SA (K-DUR) 10 MEQ Tab CR Take 1 Tab by mouth every day. 90 Tab 3   • Tiotropium Bromide Monohydrate (SPIRIVA RESPIMAT) 2.5 MCG/ACT Aero Soln Inhale 2 Inhalation by mouth every day. Assemble and prime. 3  "Inhaler 3   • Cyanocobalamin (VITAMIN B-12 SL) Place  under tongue every day.     • citalopram (CELEXA) 20 MG Tab TAKE ONE TABLET BY MOUTH ONCE DAILY 90 Tab 3   • metformin (GLUCOPHAGE) 1000 MG tablet Take 1,000 mg by mouth 2 times a day, with meals.     • aspirin (ASA) 325 MG TABS Take 325 mg by mouth every day.     • Multiple Vitamin (MULTI-VITAMINS) TABS Take 1 Tab by mouth every day.     • Calcium Carbonate-Vitamin D (CALCIUM 600+D) 600-200 MG-UNIT TABS Take 2 Tabs by mouth every day.     • allopurinol (ZYLOPRIM) 300 MG TABS Take 300 mg by mouth 2 times a day.     • gabapentin (NEURONTIN) 300 MG CAPS Take 600 mg by mouth 3 times a day.     • albuterol (VENTOLIN HFA) 108 (90 BASE) MCG/ACT Aero Soln inhalation aerosol Inhale 2 Puffs by mouth every four hours as needed for Shortness of Breath (Wheezing). 1 Inhaler 6   • Omega-3 Fatty Acids (FISH OIL) 300 MG CAPS Take 1 Tab by mouth every day.       No facility-administered encounter medications on file as of 9/12/2017.      ROS     Objective:   /74   Pulse 80   Ht 1.575 m (5' 2.01\")   Wt 73.5 kg (162 lb)   SpO2 94%   BMI 29.62 kg/m²     Physical Exam   Constitutional: She is oriented to person, place, and time. She appears well-developed. No distress.   HENT:   Mouth/Throat: Mucous membranes are normal.   Eyes: Conjunctivae and EOM are normal.   Neck: No JVD present. No tracheal deviation present. No thyroid mass and no thyromegaly present.   Cardiovascular: Normal rate, regular rhythm and intact distal pulses.    No murmur heard.  Mild edema   Pulmonary/Chest: Effort normal and breath sounds normal. No respiratory distress. She exhibits no tenderness.   Abdominal: Soft. There is no tenderness.   Musculoskeletal: Normal range of motion. She exhibits no edema.   Neurological: She is alert and oriented to person, place, and time. She has normal strength. She displays no tremor.   Skin: Skin is warm and dry. She is not diaphoretic.   Psychiatric: She has a " normal mood and affect. Her behavior is normal.   Vitals reviewed.      Assessment:     1. Presence of biventricular AICD     2. Essential hypertension     3. Ventricular tachycardia (CMS-HCC)     4. LBBB (left bundle branch block)     5. Dilated cardiomyopathy (CMS-HCC)         Medical Decision Making:  Today's Assessment / Status / Plan:   1. NICM on appropriate meds. Being seen CHF clinic in a few weeks.  2. VT none.  3. BIV AICD nl function.  4. F/U in 4 months.

## 2017-09-12 NOTE — LETTER
[facility]   [address]  [place] [address]  Phone: [phone]  Fax: [phone]              Isatu Dang  1950    Encounter Date: 9/12/2017    Felix Alexis M.D.          PROGRESS NOTE:  Subjective:   Isatu Dang is a 66 y.o. female who presents today with NICM with mild to moderate CHF. No chest  Pain. Mild edema. Not great about salt and H20 restriction. No shocks. LBBB with BIV device.    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   • CATARACT     Beginning stages no surgery yet   • Congestive heart failure (CHF) (CMS-HCC)    • Dental disorder     Upper/Lower dentures.   • Depression    • Diabetes (CMS-HCC)    • Dilated cardiomyopathy    • Hypercholesteremia    • Indigestion    • Joint replacement         • LBBB (left bundle branch block)    • Neuropathy    • Obesity    • Peripheral neuropathy    • Renal cyst, acquired, left      History of benign cystic mass on the lower left kidney.   • Sleep apnea    • Unspecified hemorrhagic conditions     from aspirin   • Ventricular tachycardia (CMS-HCC)      Past Surgical History:   Procedure Laterality Date   • AICD BATTERY CHANGE  November 2016    Generator replacement with Medtronic Viva S CRT-D [id number] implanted by Dr. Alexis. Original device implanted in 1998   • LARYNGOSCOPY  11/14/2013    Performed by Judson Willis M.D. at SURGERY SAME DAY [facility] ORS   • BIOPSY GENERAL  11/14/2013    Performed by Judson Willis M.D. at SURGERY SAME DAY [facility] ORS   • THORACOSCOPY ROBOTIC  8/27/2012    Performed by GANSER, JOHN H at SURGERY [facility] ORS   • RECOVERY  7/23/2012       Performed by SURGERY, IR-RECOVERY at SURGERY SAME DAY [facility] ORS   • BONE MARROW ASPIRATION  6/22/2012    Performed by ISAIAS EVERETT at ENDOSCOPY [facility] ORS   • BONE MARROW BIOPSY, NDL/TROCAR  6/22/2012    Performed by ISAIAS EVERETT at ENDOSCOPY [facility] ORS   • NECK MASS EXCISION  5/24/2012    Performed by LAURA RAMIRES at SURGERY SAME DAY [facility] ORS   • RECOVERY  10/21/2011    Performed by SURGERY, CATH-RECOVERY at SURGERY SAME DAY [facility] ORS   • CARPAL TUNNEL RELEASE  4/3/08    Performed by DEEP GRENE at SURGERY [facility] ORS   • LIPOMA EXCISION  4/3/08    Performed by DEEP GREEN at [facility] ORS   • CARPAL TUNNEL RELEASE Right 05/07        • HYSTERECTOMY, TOTAL ABDOMINAL  1995   • HYSTERECTOMY, TOTAL ABDOMINAL          • IMPLANTABLE CARDIOVERTER DEFIBRILLATOR (ICD)  4/1/98, 03/2007, 10/2011   • TONSILLECTOMY AND ADENOIDECTOMY            Family History   Problem Relation Age of Onset   • Diabetes Mother    • Cancer Mother    • Diabetes Sister    • Diabetes Maternal Grandmother      History   Smoking Status   • Former Smoker   • Packs/day: 2.00   • Years: 30.00   • Types: Cigarettes   • Quit date: 10/20/1998   Smokeless Tobacco   • Never Used     Allergies   Allergen Reactions   • Ace Inhibitors      cough   • Tape Contact Dermatitis     Outpatient Encounter Prescriptions as of 9/12/2017   Medication Sig Dispense Refill   • zolpidem (AMBIEN) 10 MG Tab TAKE ONE TABLET BY MOUTH AT BEDTIME, as needed prn insomnia 90 Tab 1   • albuterol (PROAIR HFA) 108 (90 BASE) MCG/ACT Aero Soln inhalation aerosol Inhale 2 Puffs by mouth every 6 hours as needed for Shortness of Breath. 3 Inhaler 3   • carvedilol (COREG) 25 MG Tab Take 1 Tab by mouth 2 times a day, with meals. 180 Tab 3   • digoxin (LANOXIN) 125 MCG Tab Take 1 Tab by mouth every day. 90 Tab 3   • furosemide (LASIX) 40 MG Tab Take 1 Tab by mouth 2 Times a Day. 180 Tab 3   • losartan (COZAAR) 50 MG Tab Take 1 Tab  "by mouth every day. 90 Tab 3   • potassium chloride SA (K-DUR) 10 MEQ Tab CR Take 1 Tab by mouth every day. 90 Tab 3   • Tiotropium Bromide Monohydrate (SPIRIVA RESPIMAT) 2.5 MCG/ACT Aero Soln Inhale 2 Inhalation by mouth every day. Assemble and prime. 3 Inhaler 3   • Cyanocobalamin (VITAMIN B-12 SL) Place  under tongue every day.     • citalopram (CELEXA) 20 MG Tab TAKE ONE TABLET BY MOUTH ONCE DAILY 90 Tab 3   • metformin (GLUCOPHAGE) 1000 MG tablet Take 1,000 mg by mouth 2 times a day, with meals.     • aspirin (ASA) 325 MG TABS Take 325 mg by mouth every day.     • Multiple Vitamin (MULTI-VITAMINS) TABS Take 1 Tab by mouth every day.     • Calcium Carbonate-Vitamin D (CALCIUM 600+D) 600-200 MG-UNIT TABS Take 2 Tabs by mouth every day.     • allopurinol (ZYLOPRIM) 300 MG TABS Take 300 mg by mouth 2 times a day.     • gabapentin (NEURONTIN) 300 MG CAPS Take 600 mg by mouth 3 times a day.     • albuterol (VENTOLIN HFA) 108 (90 BASE) MCG/ACT Aero Soln inhalation aerosol Inhale 2 Puffs by mouth every four hours as needed for Shortness of Breath (Wheezing). 1 Inhaler 6   • Omega-3 Fatty Acids (FISH OIL) 300 MG CAPS Take 1 Tab by mouth every day.       No facility-administered encounter medications on file as of 9/12/2017.      ROS     Objective:   /74   Pulse 80   Ht 1.575 m (5' 2.01\")   Wt 73.5 kg (162 lb)   SpO2 94%   BMI 29.62 kg/m²      Physical Exam   Constitutional: She is oriented to person, place, and time. She appears well-developed. No distress.   HENT:   Mouth/Throat: Mucous membranes are normal.   Eyes: Conjunctivae and EOM are normal.   Neck: No JVD present. No tracheal deviation present. No thyroid mass and no thyromegaly present.   Cardiovascular: Normal rate, regular rhythm and intact distal pulses.    No murmur heard.  Mild edema   Pulmonary/Chest: Effort normal and breath sounds normal. No respiratory distress. She exhibits no tenderness.   Abdominal: Soft. There is no tenderness. "   Musculoskeletal: Normal range of motion. She exhibits no edema.   Neurological: She is alert and oriented to person, place, and time. She has normal strength. She displays no tremor.   Skin: Skin is warm and dry. She is not diaphoretic.   Psychiatric: She has a normal mood and affect. Her behavior is normal.   Vitals reviewed.      Assessment:     1. Presence of biventricular AICD     2. Essential hypertension     3. Ventricular tachycardia (CMS-HCC)     4. LBBB (left bundle branch block)     5. Dilated cardiomyopathy (CMS-HCC)         Medical Decision Making:  Today's Assessment / Status / Plan:   1. NICM on appropriate meds. Being seen CHF clinic in a few weeks.  2. VT none.  3. BIV AICD nl function.  4. F/U in 4 months.      Edwin Urias M.D.  [address] [place] [address] [address]  [address]  [place] [id number]  VIA Facsimile: [phone]

## 2017-09-14 ENCOUNTER — OFFICE VISIT (OUTPATIENT)
Dept: CARDIOLOGY | Facility: MEDICAL CENTER | Age: 67
End: 2017-09-14
Payer: MEDICARE

## 2017-09-14 VITALS
BODY MASS INDEX: 29.81 KG/M2 | WEIGHT: 162 LBS | SYSTOLIC BLOOD PRESSURE: 92 MMHG | DIASTOLIC BLOOD PRESSURE: 60 MMHG | HEART RATE: 82 BPM | HEIGHT: 62 IN | OXYGEN SATURATION: 96 %

## 2017-09-14 DIAGNOSIS — I47.20 VENTRICULAR TACHYCARDIA (HCC): ICD-10-CM

## 2017-09-14 DIAGNOSIS — I50.22 STAGE C CHRONIC SYSTOLIC CONGESTIVE HEART FAILURE (HCC): ICD-10-CM

## 2017-09-14 DIAGNOSIS — I50.40 COMBINED SYSTOLIC AND DIASTOLIC CONGESTIVE HEART FAILURE, UNSPECIFIED CONGESTIVE HEART FAILURE CHRONICITY: ICD-10-CM

## 2017-09-14 DIAGNOSIS — I42.0 DILATED CARDIOMYOPATHY (HCC): ICD-10-CM

## 2017-09-14 DIAGNOSIS — I44.7 LBBB (LEFT BUNDLE BRANCH BLOCK): ICD-10-CM

## 2017-09-14 DIAGNOSIS — Z95.810 PRESENCE OF BIVENTRICULAR AICD: ICD-10-CM

## 2017-09-14 PROCEDURE — 94620 PR PULMONARY STRESS TESTING,SIMPLE: CPT | Performed by: INTERNAL MEDICINE

## 2017-09-14 PROCEDURE — 99215 OFFICE O/P EST HI 40 MIN: CPT | Mod: 25 | Performed by: INTERNAL MEDICINE

## 2017-09-14 RX ORDER — AZELASTINE HYDROCHLORIDE, FLUTICASONE PROPIONATE 137; 50 UG/1; UG/1
SPRAY, METERED NASAL
COMMUNITY
End: 2017-09-29

## 2017-09-14 RX ORDER — LOPERAMIDE HYDROCHLORIDE 2 MG/1
2 CAPSULE ORAL 4 TIMES DAILY PRN
COMMUNITY
End: 2017-11-16

## 2017-09-14 ASSESSMENT — 6 MINUTE WALK TEST (6MWT): TOTAL DISTANCE WALKED (METERS): 369

## 2017-09-14 ASSESSMENT — MINNESOTA LIVING WITH HEART FAILURE QUESTIONNAIRE (MLHF)
TIRED, FATIGUED OR LOW ON ENERGY: 2
SWELLING IN ANKLES OR LEGS: 1
DIFFICULTY WITH SEXUAL ACTIVITIES: 3
MAKING YOU FEEL DEPRESSED: 1
GIVING YOU SIDE EFFECTS FROM TREATMENTS: 0
DIFFICULTY GOING AWAY FROM HOME: 0
COSTING YOU MONEY FOR MEDICAL CARE: 0
DIFFICULTY SOCIALIZING WITH FAMILY OR FRIENDS: 1
DIFFICULTY WITH RECREATIONAL PASTIMES, SPORTS, HOBBIES: 1
DIFFICULTY TO CONCENTRATE OR REMEMBERING THINGS: 2
MAKING YOU WORRY: 2
HAVING TO SIT OR LIE DOWN DURING THE DAY: 3
WORKING AROUND THE HOUSE OR YARD DIFFICULT: 3
EATING LESS FOODS YOU LIKE: 4
MAKING YOU STAY IN A HOSPITAL: 0
DIFFICULTY SLEEPING WELL AT NIGHT: 1
MAKING YOU SHORT OF BREATH: 2
LOSS OF SELF CONTROL IN YOUR LIFE: 0
TOTAL_SCORE: 28
DIFFICULTY WORKING TO EARN A LIVING: 0
WALKING ABOUT OR CLIMBING STAIRS DIFFICULT: 2
FEELING LIKE A BURDEN TO FAMILY AND FRIENDS: 0

## 2017-09-14 ASSESSMENT — ENCOUNTER SYMPTOMS
CHILLS: 0
BRUISES/BLEEDS EASILY: 0
LOSS OF CONSCIOUSNESS: 0
WHEEZING: 0
CLAUDICATION: 0
EYES NEGATIVE: 1
STRIDOR: 0
CONSTITUTIONAL NEGATIVE: 1
CARDIOVASCULAR NEGATIVE: 1
NEUROLOGICAL NEGATIVE: 1
SHORTNESS OF BREATH: 0
MUSCULOSKELETAL NEGATIVE: 1
PND: 0
HEMOPTYSIS: 0
COUGH: 0
DIZZINESS: 0
FEVER: 0
ORTHOPNEA: 0
GASTROINTESTINAL NEGATIVE: 1
PALPITATIONS: 0
SPUTUM PRODUCTION: 0
SORE THROAT: 0
WEAKNESS: 0
RESPIRATORY NEGATIVE: 1

## 2017-09-14 NOTE — PROGRESS NOTES
Subjective:   Isatu Dang is a 66 y.o. female who presents today as a follow-up in new consultation for her heart failure. She's been managed as an outpatient with her heart failure for some time and been well compliant. She's been on losartan 50 for a long period of time. She does have an ACE inhibitor allergy which is only cough but no angioedema. Otherwise her blood pressure usually running about 110-120. She has no lower extremity edema.    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   • CATARACT     Beginning stages no surgery yet   • Congestive heart failure (CHF) (CMS-HCC)    • Dental disorder     Upper/Lower dentures.   • Depression    • Diabetes (CMS-HCC)    • Dilated cardiomyopathy    • Hypercholesteremia    • Indigestion    • Joint replacement         • LBBB (left bundle branch block)    • Neuropathy    • Obesity    • Peripheral neuropathy    • Renal cyst, acquired, left      History of benign cystic mass on the lower left kidney.   • Sleep apnea    • Unspecified hemorrhagic conditions     from aspirin   • Ventricular tachycardia (CMS-HCC)      Past Surgical History:   Procedure Laterality Date   • AICD BATTERY CHANGE  November 2016    Generator replacement with Medtronic Viva S CRT-D [id number] implanted by Dr. Alexis. Original device implanted in 1998   • LARYNGOSCOPY  11/14/2013    Performed by Judson Willis M.D. at SURGERY SAME DAY [facility] ORS   • BIOPSY GENERAL  11/14/2013    Performed by Judson Willis M.D. at SURGERY SAME DAY [facility] ORS   • THORACOSCOPY ROBOTIC  8/27/2012    Performed by GANSER, JOHN H at SURGERY [facility] ORS   • RECOVERY  7/23/2012    Performed by SURGERY,  IR-RECOVERY at SURGERY SAME DAY [facility] ORS   • BONE MARROW ASPIRATION  6/22/2012    Performed by ISAIAS EVERETT at ENDOSCOPY [facility] ORS   • BONE MARROW BIOPSY, NDL/TROCAR  6/22/2012    Performed by ISAIAS EVERETT at ENDOSCOPY [facility] ORS   • NECK MASS EXCISION  5/24/2012    Performed by LAURA RAMIRES at SURGERY SAME DAY [facility] ORS   • RECOVERY  10/21/2011    Performed by SURGERY, CATH-RECOVERY at SURGERY SAME DAY [facility] ORS   • CARPAL TUNNEL RELEASE  4/3/08    Performed by DEEP GREEN at SURGERY [facility] ORS   • LIPOMA EXCISION  4/3/08    Performed by DEEP GREEN at SURGERY [facility] ORS   • CARPAL TUNNEL RELEASE Right 05/07        • HYSTERECTOMY, TOTAL ABDOMINAL  1995   • HYSTERECTOMY, TOTAL ABDOMINAL          • IMPLANTABLE CARDIOVERTER DEFIBRILLATOR (ICD)  4/1/98, 03/2007, 10/2011   • TONSILLECTOMY AND ADENOIDECTOMY            Family History   Problem Relation Age of Onset   • Diabetes Mother    • Cancer Mother    • Diabetes Sister    • Diabetes Maternal Grandmother      History   Smoking Status   • Former Smoker   • Packs/day: 2.00   • Years: 30.00   • Types: Cigarettes   • Quit date: 10/20/1998   Smokeless Tobacco   • Never Used     Allergies   Allergen Reactions   • Ace Inhibitors      cough   • Tape Contact Dermatitis     Outpatient Encounter Prescriptions as of 9/14/2017   Medication Sig Dispense Refill   • Loperamide HCl (ANTI-DIARRHEAL PO) Take  by mouth.     • Azelastine-Fluticasone 137-50 MCG/ACT Suspension Spray  in nose.     • loperamide (IMODIUM) 2 MG Cap Take 2 mg by mouth 4 times a day as needed for Diarrhea.     • sacubitril-valsartan (ENTRESTO) 24-26 MG Tab tablet Take 1 Tab by mouth 2 Times a Day. 60 Tab 11   • zolpidem (AMBIEN) 10 MG Tab TAKE ONE TABLET BY MOUTH AT BEDTIME, as needed prn insomnia 90 Tab 1   • albuterol (PROAIR HFA) 108 (90 BASE) MCG/ACT Aero Soln inhalation aerosol Inhale 2 Puffs by mouth every 6 hours as needed for Shortness of Breath. 3  Inhaler 3   • carvedilol (COREG) 25 MG Tab Take 1 Tab by mouth 2 times a day, with meals. 180 Tab 3   • digoxin (LANOXIN) 125 MCG Tab Take 1 Tab by mouth every day. 90 Tab 3   • furosemide (LASIX) 40 MG Tab Take 1 Tab by mouth 2 Times a Day. 180 Tab 3   • potassium chloride SA (K-DUR) 10 MEQ Tab CR Take 1 Tab by mouth every day. 90 Tab 3   • Tiotropium Bromide Monohydrate (SPIRIVA RESPIMAT) 2.5 MCG/ACT Aero Soln Inhale 2 Inhalation by mouth every day. Assemble and prime. 3 Inhaler 3   • albuterol (VENTOLIN HFA) 108 (90 BASE) MCG/ACT Aero Soln inhalation aerosol Inhale 2 Puffs by mouth every four hours as needed for Shortness of Breath (Wheezing). 1 Inhaler 6   • Cyanocobalamin (VITAMIN B-12 SL) Place  under tongue every day.     • citalopram (CELEXA) 20 MG Tab TAKE ONE TABLET BY MOUTH ONCE DAILY 90 Tab 3   • metformin (GLUCOPHAGE) 1000 MG tablet Take 1,000 mg by mouth 2 times a day, with meals.     • aspirin (ASA) 325 MG TABS Take 325 mg by mouth every day.     • Multiple Vitamin (MULTI-VITAMINS) TABS Take 1 Tab by mouth every day.     • Omega-3 Fatty Acids (FISH OIL) 300 MG CAPS Take 1 Tab by mouth every day.     • Calcium Carbonate-Vitamin D (CALCIUM 600+D) 600-200 MG-UNIT TABS Take 2 Tabs by mouth every day.     • allopurinol (ZYLOPRIM) 300 MG TABS Take 300 mg by mouth every day.     • gabapentin (NEURONTIN) 300 MG CAPS Take 600 mg by mouth 3 times a day.     • [DISCONTINUED] losartan (COZAAR) 50 MG Tab Take 1 Tab by mouth every day. 90 Tab 3     No facility-administered encounter medications on file as of 9/14/2017.      Review of Systems   Constitutional: Negative.  Negative for chills, fever and malaise/fatigue.   HENT: Negative.  Negative for sore throat.    Eyes: Negative.    Respiratory: Negative.  Negative for cough, hemoptysis, sputum production, shortness of breath, wheezing and stridor.    Cardiovascular: Negative.  Negative for chest pain, palpitations, orthopnea, claudication, leg swelling and PND.  "  Gastrointestinal: Negative.    Genitourinary: Negative.    Musculoskeletal: Negative.    Skin: Negative.    Neurological: Negative.  Negative for dizziness, loss of consciousness and weakness.   Endo/Heme/Allergies: Negative.  Does not bruise/bleed easily.   All other systems reviewed and are negative.       Objective:   BP (!) 92/60   Pulse 82   Ht 1.575 m (5' 2\")   Wt 73.5 kg (162 lb)   SpO2 96%   BMI 29.63 kg/m²     Physical Exam   Constitutional: She is oriented to person, place, and time. She appears well-developed and well-nourished. No distress.   HENT:   Head: Normocephalic.   Mouth/Throat: Oropharynx is clear and moist.   Eyes: EOM are normal. Pupils are equal, round, and reactive to light. Right eye exhibits no discharge. Left eye exhibits no discharge. No scleral icterus.   Neck: Normal range of motion. Neck supple. No JVD present. No tracheal deviation present.   Cardiovascular: Normal rate, regular rhythm, S1 normal, S2 normal, normal heart sounds, intact distal pulses and normal pulses.  Exam reveals no gallop, no S3, no S4 and no friction rub.    No murmur heard.   No systolic murmur is present    No diastolic murmur is present   Pulses:       Carotid pulses are 2+ on the right side, and 2+ on the left side.       Radial pulses are 2+ on the right side, and 2+ on the left side.        Dorsalis pedis pulses are 2+ on the right side, and 2+ on the left side.        Posterior tibial pulses are 2+ on the right side, and 2+ on the left side.   Pulmonary/Chest: Effort normal and breath sounds normal. No respiratory distress. She has no wheezes. She has no rales.   Abdominal: Soft. Bowel sounds are normal. She exhibits no distension and no mass. There is no tenderness. There is no rebound and no guarding.   Musculoskeletal: She exhibits no edema.   Neurological: She is alert and oriented to person, place, and time. No cranial nerve deficit.   Skin: Skin is warm and dry. She is not diaphoretic. No " pallor.   Psychiatric: She has a normal mood and affect. Her behavior is normal. Judgment and thought content normal.   Nursing note and vitals reviewed.      Assessment:     1. Combined systolic and diastolic congestive heart failure, unspecified congestive heart failure chronicity (CMS-HCC)     2. Dilated cardiomyopathy (CMS-HCC)     3. Ventricular tachycardia (CMS-HCC)     4. Presence of biventricular AICD     5. LBBB (left bundle branch block)     6. Stage C chronic systolic congestive heart failure (CMS-HCC)  sacubitril-valsartan (ENTRESTO) 24-26 MG Tab tablet       Medical Decision Making:  Today's Assessment / Status / Plan:     66-year-old female with heart failure with reduced ejection fraction. We will go ahead and start her on ARNI today. I don't this represent a problem given her ACE inhibitor induced cough. I did give her anticipatory guidance regarding fatigue and low blood pressure. We will start her on the lowest dose and see her back in a couple of weeks to make sure she is tolerating this well.    Thank for you allowing me to take part in your patient's care, please call should you have any questions or would like to discuss this patient.

## 2017-09-15 NOTE — PROGRESS NOTES
"Heart Failure New Appointment     Per MD, patient is already established.    6MWT- 369 meters  MLWHF- 28    OP Heart Failure  Vitals  Appointment Type: Heart Failure Established  Weight: 73.5 kg (162 lb)  How Weight Obtained: Stand Up Scale  Height: 157.5 cm (5' 2\")  BMI (Calculated): 29.63  Blood Pressure : (!) 92/60  Pulse: 82    System Assessment  NYHA Functional Class Assessment:  (Combined heart failure)  ACC/AHA HF Stage:  (no stage/class given)    Smoking Hx  Have you Ever Smoked: Yes  Have you Smoked in the Last 12 Mos: No  Confirm Quit Date: 10/20/98     Alcohol Hx  Do you Drink?: No     Illicit Drug Hx  Illicit Drug History: No    MN Living with Heart Failure  Swelling in Ankles or Legs: 1  Having to Sit or Lie Down During the Day: 3  Walking About or Climbing Stairs Difficult: 2  Working Around the House or Yard Difficult: 3  Difficulty Going Away from Home: 0  Difficulty Sleeping Well at Night: 1  Difficulty Socializing with Family or Friends: 1  Difficulty Working to Earn a Livin  Difficulty with Recreational Pastimes, Sports, Hobbies: 1  Difficulty with Sexual Activities: 3  Eating Less Foods You Like: 4  Making you Short of Breath: 2  Tired, Fatigued or Low on Energy: 2  Making you Stay in a Hospital: 0  Costing you Money for Medical Care?: 0  Giving you Side Effects from Treatments: 0  Feeling like a [place] to Family and Friends: 0  Loss of Self Control in your Life: 0  Making You Worry: 2  Difficulty to Concentrate or Remembering Things: 2  Making you Feel Depressed: 1  MLF Total Score : 28    6 Minute Walk Test  Baseline to end of test: 6:00  Total meters walked: 369       KAREN Johnson RN  [phone]  "

## 2017-09-18 ENCOUNTER — APPOINTMENT (OUTPATIENT)
Dept: RADIOLOGY | Facility: MEDICAL CENTER | Age: 67
End: 2017-09-18
Attending: INTERNAL MEDICINE
Payer: MEDICARE

## 2017-09-19 ENCOUNTER — HOSPITAL ENCOUNTER (OUTPATIENT)
Dept: RADIOLOGY | Facility: MEDICAL CENTER | Age: 67
End: 2017-09-19
Attending: INTERNAL MEDICINE
Payer: MEDICARE

## 2017-09-19 DIAGNOSIS — Z85.72 PERSONAL HISTORY OF MALIGNANT LYMPHOMA: ICD-10-CM

## 2017-09-19 PROCEDURE — A9552 F18 FDG: HCPCS

## 2017-09-29 ENCOUNTER — OFFICE VISIT (OUTPATIENT)
Dept: CARDIOLOGY | Facility: MEDICAL CENTER | Age: 67
End: 2017-09-29
Payer: MEDICARE

## 2017-09-29 VITALS
DIASTOLIC BLOOD PRESSURE: 70 MMHG | HEIGHT: 62 IN | BODY MASS INDEX: 30 KG/M2 | SYSTOLIC BLOOD PRESSURE: 88 MMHG | WEIGHT: 163 LBS | OXYGEN SATURATION: 91 % | HEART RATE: 88 BPM

## 2017-09-29 DIAGNOSIS — I50.22 STAGE C CHRONIC SYSTOLIC CONGESTIVE HEART FAILURE (HCC): ICD-10-CM

## 2017-09-29 DIAGNOSIS — Z95.810 PRESENCE OF BIVENTRICULAR AICD: ICD-10-CM

## 2017-09-29 DIAGNOSIS — I10 ESSENTIAL HYPERTENSION: ICD-10-CM

## 2017-09-29 DIAGNOSIS — I44.7 LBBB (LEFT BUNDLE BRANCH BLOCK): ICD-10-CM

## 2017-09-29 DIAGNOSIS — I50.9 HEART FAILURE, NYHA CLASS 2 (HCC): ICD-10-CM

## 2017-09-29 DIAGNOSIS — C82.80 OTHER TYPE OF FOLLICULAR LYMPHOMA, UNSPECIFIED BODY REGION (HCC): ICD-10-CM

## 2017-09-29 DIAGNOSIS — I47.20 VENTRICULAR TACHYCARDIA (HCC): ICD-10-CM

## 2017-09-29 DIAGNOSIS — I42.0 DILATED CARDIOMYOPATHY (HCC): ICD-10-CM

## 2017-09-29 PROCEDURE — 99214 OFFICE O/P EST MOD 30 MIN: CPT | Performed by: NURSE PRACTITIONER

## 2017-09-29 ASSESSMENT — ENCOUNTER SYMPTOMS
PND: 0
MYALGIAS: 0
DIZZINESS: 0
ABDOMINAL PAIN: 0
CLAUDICATION: 0
ORTHOPNEA: 0
FEVER: 0
COUGH: 0
SHORTNESS OF BREATH: 1
PALPITATIONS: 0

## 2017-09-29 NOTE — LETTER
[facility]   [address]  [place] [address]  Phone: [phone]  Fax: [phone]              Isatu Dang  1950    Encounter Date: 9/29/2017    JENN Fuentes          PROGRESS NOTE:  Subjective:   Isatu Dang is a 66 y.o. female who presents today for follow-up on her heart failure.    Patient of Dr. Alexis. Patient was initially seen in the heart failure clinic on 9/14/17 with Dr. Betancourt. Patient with heart failure with reduced ejection fraction. Patient was started on ARNI- Entresto 24-26 mg BID. Patient reports she has had no problems with the medication.     For her symptoms, patient continues to have shortness of breath on occasion with ADLs and with exertion, occasional chest pain and fatigue. Patient denies palpitations, dizziness/lightheadedness, orthopnea, PND or edema.    Her home weights have been stable between 159-164 pounds. Patient has not seen a 3 pound weight gain one day or 5 pound weight gain in one week    Additonally, patient has the following medical problems:    -Hx lymphoma, followed by Oncology.    -L BBB    -BiV ICD for hx VT    -HTN: takes carvedilol 25 mg BID    -COPD: uses inhalers    -Depression: takes Citalopram    -Diabetes: takes metformin      Past Medical History:   Diagnosis Date   • Lung cancer (CMS-HCC) 2012        • Cancer (CMS-HCC) 2012    Lymphoma to neck   • Pain 2011    Legs pain controled on meds   • Hypertension 1998    Pt states bp runs low on current meds   • Myocardial infarct 1998   • Cardiac arrest (CMS-HCC) 1998        • Arrhythmia    • Bowel habit changes     Diarrhea.   • CATARACT     Beginning stages no surgery yet   • Congestive heart failure (CHF) (CMS-HCC)    • Dental disorder     Upper/Lower dentures.   • Depression    • Diabetes (CMS-HCC)    • Dilated cardiomyopathy    • Hypercholesteremia    • Indigestion    • Joint replacement         • LBBB (left bundle branch  block)    • Neuropathy    • Obesity    • Peripheral neuropathy (CMS-HCC)    • Renal cyst, acquired, left      History of benign cystic mass on the lower left kidney.   • Sleep apnea    • Unspecified hemorrhagic conditions     from aspirin   • Ventricular tachycardia (CMS-HCC)      Past Surgical History:   Procedure Laterality Date   • AICD BATTERY CHANGE  November 2016    Generator replacement with Medtronic Viva S CRT-D [id number] implanted by Dr. Alexis. Original device implanted in 1998   • LARYNGOSCOPY  11/14/2013    Performed by Judson Willis M.D. at SURGERY SAME DAY [facility] ORS   • BIOPSY GENERAL  11/14/2013    Performed by Judson Willis M.D. at SURGERY SAME DAY [facility] ORS   • THORACOSCOPY ROBOTIC  8/27/2012    Performed by GANSER, JOHN H at SURGERY [facility]   • RECOVERY  7/23/2012    Performed by SURGERY, IR-RECOVERY at SURGERY SAME DAY [facility]   • BONE MARROW ASPIRATION  6/22/2012    Performed by ISAIAS EVERETT at ENDOSCOPY [facility] ORS   • BONE MARROW BIOPSY, NDL/TROCAR  6/22/2012    Performed by ISAIAS EVERETT at ENDOSCOPY [facility] ORS   • NECK MASS EXCISION  5/24/2012    Performed by JUDSON WILLIS at SURGERY SAME DAY [facility] ORS   • RECOVERY  10/21/2011    Performed by SURGERY, CATH-RECOVERY at SURGERY SAME DAY [facility] ORS   • CARPAL TUNNEL RELEASE  4/3/08    Performed by DEEP GREEN at SURGERY [facility]   • LIPOMA EXCISION  4/3/08    Performed by DEEP GREEN at [facility]   • CARPAL TUNNEL RELEASE Right 05/07        • HYSTERECTOMY, TOTAL ABDOMINAL  1995   • HYSTERECTOMY, TOTAL ABDOMINAL          • IMPLANTABLE CARDIOVERTER DEFIBRILLATOR (ICD)  4/1/98, 03/2007, 10/2011   • TONSILLECTOMY AND ADENOIDECTOMY            Family History   Problem Relation Age of Onset   • Diabetes Mother    • Cancer Mother    • Diabetes Sister    • Diabetes Maternal Grandmother      History   Smoking Status   • Former Smoker   • Packs/day: 2.00   • Years: 30.00   •  Types: Cigarettes   • Quit date: 10/20/1998   Smokeless Tobacco   • Never Used     Allergies   Allergen Reactions   • Ace Inhibitors      cough   • Tape Contact Dermatitis     Outpatient Encounter Prescriptions as of 9/29/2017   Medication Sig Dispense Refill   • loperamide (IMODIUM) 2 MG Cap Take 2 mg by mouth 4 times a day as needed for Diarrhea.     • sacubitril-valsartan (ENTRESTO) 24-26 MG Tab tablet Take 1 Tab by mouth 2 Times a Day. 60 Tab 11   • zolpidem (AMBIEN) 10 MG Tab TAKE ONE TABLET BY MOUTH AT BEDTIME, as needed prn insomnia 90 Tab 1   • albuterol (PROAIR HFA) 108 (90 BASE) MCG/ACT Aero Soln inhalation aerosol Inhale 2 Puffs by mouth every 6 hours as needed for Shortness of Breath. 3 Inhaler 3   • carvedilol (COREG) 25 MG Tab Take 1 Tab by mouth 2 times a day, with meals. 180 Tab 3   • digoxin (LANOXIN) 125 MCG Tab Take 1 Tab by mouth every day. 90 Tab 3   • furosemide (LASIX) 40 MG Tab Take 1 Tab by mouth 2 Times a Day. 180 Tab 3   • potassium chloride SA (K-DUR) 10 MEQ Tab CR Take 1 Tab by mouth every day. 90 Tab 3   • Tiotropium Bromide Monohydrate (SPIRIVA RESPIMAT) 2.5 MCG/ACT Aero Soln Inhale 2 Inhalation by mouth every day. Assemble and prime. 3 Inhaler 3   • Cyanocobalamin (VITAMIN B-12 SL) Place  under tongue every day.     • citalopram (CELEXA) 20 MG Tab TAKE ONE TABLET BY MOUTH ONCE DAILY 90 Tab 3   • metformin (GLUCOPHAGE) 1000 MG tablet Take 1,000 mg by mouth 2 times a day, with meals.     • aspirin (ASA) 325 MG TABS Take 325 mg by mouth every day.     • Multiple Vitamin (MULTI-VITAMINS) TABS Take 1 Tab by mouth every day.     • Omega-3 Fatty Acids (FISH OIL) 300 MG CAPS Take 1 Tab by mouth every day.     • Calcium Carbonate-Vitamin D (CALCIUM 600+D) 600-200 MG-UNIT TABS Take 2 Tabs by mouth every day.     • allopurinol (ZYLOPRIM) 300 MG TABS Take 300 mg by mouth every day.     • gabapentin (NEURONTIN) 300 MG CAPS Take 600 mg by mouth 3 times a day.     • [DISCONTINUED] Loperamide HCl  "(ANTI-DIARRHEAL PO) Take  by mouth.     • [DISCONTINUED] Azelastine-Fluticasone 137-50 MCG/ACT Suspension Spray  in nose.     • [DISCONTINUED] albuterol (VENTOLIN HFA) 108 (90 BASE) MCG/ACT Aero Soln inhalation aerosol Inhale 2 Puffs by mouth every four hours as needed for Shortness of Breath (Wheezing). 1 Inhaler 6     No facility-administered encounter medications on file as of 9/29/2017.      Review of Systems   Constitutional: Positive for malaise/fatigue. Negative for fever.   Respiratory: Positive for shortness of breath. Negative for cough.    Cardiovascular: Positive for chest pain (occ). Negative for palpitations, orthopnea, claudication, leg swelling and PND.   Gastrointestinal: Negative for abdominal pain.   Musculoskeletal: Negative for myalgias.   Neurological: Negative for dizziness.   All other systems reviewed and are negative.       Objective:   BP (!) 88/70   Pulse 88   Ht 1.575 m (5' 2\")   Wt 73.9 kg (163 lb)   SpO2 91%   BMI 29.81 kg/m²      Physical Exam   Constitutional: She is oriented to person, place, and time. She appears well-developed and well-nourished.   HENT:   Head: Normocephalic and atraumatic.   Eyes: EOM are normal.   Neck: Normal range of motion. Neck supple. No JVD present.   Cardiovascular: Normal rate, regular rhythm, normal heart sounds and intact distal pulses.    No murmur heard.  Pulmonary/Chest: Effort normal and breath sounds normal. No respiratory distress. She has no wheezes. She has no rales.   Left Chest ICD-no erosion, drainage or erythema.   Abdominal: Soft. Bowel sounds are normal.   Musculoskeletal: Normal range of motion. She exhibits no edema.   Neurological: She is alert and oriented to person, place, and time.   Skin: Skin is warm and dry.   Psychiatric: She has a normal mood and affect.   Nursing note and vitals reviewed.    Lab Results   Component Value Date/Time    CHOLSTRLTOT 158 01/18/2016 08:41 AM    LDL 84 01/18/2016 08:41 AM    HDL 44 01/18/2016 " 08:41 AM    TRIGLYCERIDE 148 01/18/2016 08:41 AM       Lab Results   Component Value Date/Time    SODIUM 140 09/01/2017 06:14 AM    POTASSIUM 4.2 09/01/2017 06:14 AM    CHLORIDE 102 09/01/2017 06:14 AM    CO2 28 09/01/2017 06:14 AM    GLUCOSE 104 (H) 09/01/2017 06:14 AM    BUN 16 09/01/2017 06:14 AM    CREATININE 0.69 09/01/2017 06:14 AM    CREATININE 0.7 03/25/2008 10:20 AM     Lab Results   Component Value Date/Time    ALKPHOSPHAT 65 09/01/2017 06:14 AM    ASTSGOT 27 09/01/2017 06:14 AM    ALTSGPT 17 09/01/2017 06:14 AM    TBILIRUBIN 0.5 09/01/2017 06:14 AM      Transthoracic Echo Report 1/31/16    Left ventricle is moderately dilated.  Moderately reduced left ventricular systolic function.  Left ventricular ejection fraction is visually estimated to be 35%.  Global hypokinesis with regional variation.  Grade III diastolic dysfunction (restrictive pattern).  Moderate mitral regurgitation.  Moderate to severe aortic insufficiency.    Mild tricuspid regurgitation.  Right ventricular systolic pressure is estimated to be 55 mmHg.    Compared to the images of the prior study done 3/21/2013 -  there has   been no significant change.       Transthoracic Echo Report 6/1/17  Prior echo 1-31-16.Normal left ventricular chamber size.  Mild left ventricular hypertrophy.  Left ventricular ejection fraction is visually estimated to be 35%.  Global hypokinesis with regional variation.  Grade IV diastolic dysfunction (restrictive pattern).  Pacer/ICD wire seen in right ventricle.  Pacer/ICD wire seen in right atrium.  Aortic sclerosis without stenosis.  Moderate aortic insufficiency.  Mitral annular calcification.  Moderate tricuspid regurgitation.  Estimated right ventricular systolic pressure  is 65 mmHg.  Right heart pressures are consistent with moderate pulmonary   hypertension.  Mild pulmonic insufficiency.  Normal pericardium without effusion.    Assessment:     1. Stage C chronic systolic congestive heart failure (CMS-HCC)      2. Heart failure, NYHA class 2 (CMS-[facility])     3. Dilated cardiomyopathy (CMS-[facility])     4. LBBB (left bundle branch block)     5. Presence of biventricular AICD     6. Ventricular tachycardia (CMS-[facility])     7. Essential hypertension     8. Other type of follicular lymphoma, unspecified body region (CMS-[facility])         Medical Decision Making:  Today's Assessment / Status / Plan:   1. HFrEF, Stage C, Class 2: Based on physical examination findings, patient is euvolemic. No JVD, lungs are clear to auscultation, no pitting edema in bilateral lower extremities, no ascites.  -try to Decrease lasix to daily dosing  -Continue Entresto 24-26 mg BID (unable to increase dose today due to low BP, will have pt return in 2 weeks for med adjustment)  -Continue carvedilol 25 mg twice a day  -Continue digoxin 125 mg daily  -Get BMP today  -Has BiV ICD  -Reinforced s/sx of worsening heart failure with patient and weight monitoring. Pt verbalizes understanding. Pt to call office or RTC if present.     2. VT, s/p ICD:  -Followed by EP, Dr. Alexis    3. HTN: BP low, pt asymptomatic  -Encouraged adequate hydration  -Report symptoms to office, monitor and log BP at home. If decreasing please call office.    4. Lymphoma:  -Defer, pt has follow up with Dr. Rodríguez on 10/2/17.    FU in clinic in 2 weeks with myself. Sooner if needed.    Patient verbalizes understanding and agrees with the plan of care.     Collaborating MD: MD Edwin Sapp M.D.  [id number]  [address]  [place] [address]  VIA Facsimile: [phone]

## 2017-09-29 NOTE — PROGRESS NOTES
Subjective:   Isatu Dang is a 66 y.o. female who presents today for follow-up on her heart failure.    Patient of Dr. Alexis. Patient was initially seen in the heart failure clinic on 9/14/17 with Dr. Betancourt. Patient with heart failure with reduced ejection fraction. Patient was started on ARNI- Entresto 24-26 mg BID. Patient reports she has had no problems with the medication.     For her symptoms, patient continues to have shortness of breath on occasion with ADLs and with exertion, occasional chest pain and fatigue. Patient denies palpitations, dizziness/lightheadedness, orthopnea, PND or edema.    Her home weights have been stable between 159-164 pounds. Patient has not seen a 3 pound weight gain one day or 5 pound weight gain in one week    Additonally, patient has the following medical problems:    -Hx lymphoma, followed by Oncology.    -L BBB    -BiV ICD for hx VT    -HTN: takes carvedilol 25 mg BID    -COPD: uses inhalers    -Depression: takes Citalopram    -Diabetes: takes metformin      Past Medical History:   Diagnosis Date   • Lung cancer (CMS-HCC) 2012        • Cancer (CMS-HCC) 2012    Lymphoma to neck   • Pain 2011    Legs pain controled on meds   • Hypertension 1998    Pt states bp runs low on current meds   • Myocardial infarct 1998   • Cardiac arrest (CMS-HCC) 1998        • Arrhythmia    • Bowel habit changes     Diarrhea.   • CATARACT     Beginning stages no surgery yet   • Congestive heart failure (CHF) (CMS-HCC)    • Dental disorder     Upper/Lower dentures.   • Depression    • Diabetes (CMS-HCC)    • Dilated cardiomyopathy    • Hypercholesteremia    • Indigestion    • Joint replacement         • LBBB (left bundle branch block)    • Neuropathy    • Obesity    • Peripheral neuropathy (CMS-HCC)    • Renal cyst, acquired, left      History of benign cystic mass on the lower left kidney.   • Sleep apnea    • Unspecified hemorrhagic conditions     from aspirin   • Ventricular tachycardia  (CMS-[facility])      Past Surgical History:   Procedure Laterality Date   • AICD BATTERY CHANGE  November 2016    Generator replacement with Medtronic Viva S CRT-D [id number] implanted by Dr. Alexis. Original device implanted in 1998   • LARYNGOSCOPY  11/14/2013    Performed by Judson Willis M.D. at SURGERY SAME DAY [facility] ORS   • BIOPSY GENERAL  11/14/2013    Performed by Judson Willis M.D. at SURGERY SAME DAY [facility] ORS   • THORACOSCOPY ROBOTIC  8/27/2012    Performed by GANSER, JOHN H at SURGERY [facility] ORS   • RECOVERY  7/23/2012    Performed by SURGERY, IR-RECOVERY at SURGERY SAME DAY [facility] ORS   • BONE MARROW ASPIRATION  6/22/2012    Performed by ISAIAS EVERETT at ENDOSCOPY [facility] ORS   • BONE MARROW BIOPSY, NDL/TROCAR  6/22/2012    Performed by ISAIAS EVERETT at ENDOSCOPY [facility] ORS   • NECK MASS EXCISION  5/24/2012    Performed by JUDSON WILLIS at SURGERY SAME DAY [facility] ORS   • RECOVERY  10/21/2011    Performed by SURGERY, CATH-RECOVERY at SURGERY SAME DAY [facility] ORS   • CARPAL TUNNEL RELEASE  4/3/08    Performed by DEEP GREEN at SURGERY [facility]   • LIPOMA EXCISION  4/3/08    Performed by DEEP GREEN at [facility]   • CARPAL TUNNEL RELEASE Right 05/07        • HYSTERECTOMY, TOTAL ABDOMINAL  1995   • HYSTERECTOMY, TOTAL ABDOMINAL          • IMPLANTABLE CARDIOVERTER DEFIBRILLATOR (ICD)  4/1/98, 03/2007, 10/2011   • TONSILLECTOMY AND ADENOIDECTOMY            Family History   Problem Relation Age of Onset   • Diabetes Mother    • Cancer Mother    • Diabetes Sister    • Diabetes Maternal Grandmother      History   Smoking Status   • Former Smoker   • Packs/day: 2.00   • Years: 30.00   • Types: Cigarettes   • Quit date: 10/20/1998   Smokeless Tobacco   • Never Used     Allergies   Allergen Reactions   • Ace Inhibitors      cough   • Tape Contact Dermatitis     Outpatient Encounter Prescriptions as of 9/29/2017   Medication Sig Dispense Refill   •  loperamide (IMODIUM) 2 MG Cap Take 2 mg by mouth 4 times a day as needed for Diarrhea.     • sacubitril-valsartan (ENTRESTO) 24-26 MG Tab tablet Take 1 Tab by mouth 2 Times a Day. 60 Tab 11   • zolpidem (AMBIEN) 10 MG Tab TAKE ONE TABLET BY MOUTH AT BEDTIME, as needed prn insomnia 90 Tab 1   • albuterol (PROAIR HFA) 108 (90 BASE) MCG/ACT Aero Soln inhalation aerosol Inhale 2 Puffs by mouth every 6 hours as needed for Shortness of Breath. 3 Inhaler 3   • carvedilol (COREG) 25 MG Tab Take 1 Tab by mouth 2 times a day, with meals. 180 Tab 3   • digoxin (LANOXIN) 125 MCG Tab Take 1 Tab by mouth every day. 90 Tab 3   • furosemide (LASIX) 40 MG Tab Take 1 Tab by mouth 2 Times a Day. 180 Tab 3   • potassium chloride SA (K-DUR) 10 MEQ Tab CR Take 1 Tab by mouth every day. 90 Tab 3   • Tiotropium Bromide Monohydrate (SPIRIVA RESPIMAT) 2.5 MCG/ACT Aero Soln Inhale 2 Inhalation by mouth every day. Assemble and prime. 3 Inhaler 3   • Cyanocobalamin (VITAMIN B-12 SL) Place  under tongue every day.     • citalopram (CELEXA) 20 MG Tab TAKE ONE TABLET BY MOUTH ONCE DAILY 90 Tab 3   • metformin (GLUCOPHAGE) 1000 MG tablet Take 1,000 mg by mouth 2 times a day, with meals.     • aspirin (ASA) 325 MG TABS Take 325 mg by mouth every day.     • Multiple Vitamin (MULTI-VITAMINS) TABS Take 1 Tab by mouth every day.     • Omega-3 Fatty Acids (FISH OIL) 300 MG CAPS Take 1 Tab by mouth every day.     • Calcium Carbonate-Vitamin D (CALCIUM 600+D) 600-200 MG-UNIT TABS Take 2 Tabs by mouth every day.     • allopurinol (ZYLOPRIM) 300 MG TABS Take 300 mg by mouth every day.     • gabapentin (NEURONTIN) 300 MG CAPS Take 600 mg by mouth 3 times a day.     • [DISCONTINUED] Loperamide HCl (ANTI-DIARRHEAL PO) Take  by mouth.     • [DISCONTINUED] Azelastine-Fluticasone 137-50 MCG/ACT Suspension Spray  in nose.     • [DISCONTINUED] albuterol (VENTOLIN HFA) 108 (90 BASE) MCG/ACT Aero Soln inhalation aerosol Inhale 2 Puffs by mouth every four hours as  "needed for Shortness of Breath (Wheezing). 1 Inhaler 6     No facility-administered encounter medications on file as of 9/29/2017.      Review of Systems   Constitutional: Positive for malaise/fatigue. Negative for fever.   Respiratory: Positive for shortness of breath. Negative for cough.    Cardiovascular: Positive for chest pain (occ). Negative for palpitations, orthopnea, claudication, leg swelling and PND.   Gastrointestinal: Negative for abdominal pain.   Musculoskeletal: Negative for myalgias.   Neurological: Negative for dizziness.   All other systems reviewed and are negative.       Objective:   BP (!) 88/70   Pulse 88   Ht 1.575 m (5' 2\")   Wt 73.9 kg (163 lb)   SpO2 91%   BMI 29.81 kg/m²     Physical Exam   Constitutional: She is oriented to person, place, and time. She appears well-developed and well-nourished.   HENT:   Head: Normocephalic and atraumatic.   Eyes: EOM are normal.   Neck: Normal range of motion. Neck supple. No JVD present.   Cardiovascular: Normal rate, regular rhythm, normal heart sounds and intact distal pulses.    No murmur heard.  Pulmonary/Chest: Effort normal and breath sounds normal. No respiratory distress. She has no wheezes. She has no rales.   Left Chest ICD-no erosion, drainage or erythema.   Abdominal: Soft. Bowel sounds are normal.   Musculoskeletal: Normal range of motion. She exhibits no edema.   Neurological: She is alert and oriented to person, place, and time.   Skin: Skin is warm and dry.   Psychiatric: She has a normal mood and affect.   Nursing note and vitals reviewed.    Lab Results   Component Value Date/Time    CHOLSTRLTOT 158 01/18/2016 08:41 AM    LDL 84 01/18/2016 08:41 AM    HDL 44 01/18/2016 08:41 AM    TRIGLYCERIDE 148 01/18/2016 08:41 AM       Lab Results   Component Value Date/Time    SODIUM 140 09/01/2017 06:14 AM    POTASSIUM 4.2 09/01/2017 06:14 AM    CHLORIDE 102 09/01/2017 06:14 AM    CO2 28 09/01/2017 06:14 AM    GLUCOSE 104 (H) 09/01/2017 " 06:14 AM    BUN 16 09/01/2017 06:14 AM    CREATININE 0.69 09/01/2017 06:14 AM    CREATININE 0.7 03/25/2008 10:20 AM     Lab Results   Component Value Date/Time    ALKPHOSPHAT 65 09/01/2017 06:14 AM    ASTSGOT 27 09/01/2017 06:14 AM    ALTSGPT 17 09/01/2017 06:14 AM    TBILIRUBIN 0.5 09/01/2017 06:14 AM      Transthoracic Echo Report 1/31/16    Left ventricle is moderately dilated.  Moderately reduced left ventricular systolic function.  Left ventricular ejection fraction is visually estimated to be 35%.  Global hypokinesis with regional variation.  Grade III diastolic dysfunction (restrictive pattern).  Moderate mitral regurgitation.  Moderate to severe aortic insufficiency.    Mild tricuspid regurgitation.  Right ventricular systolic pressure is estimated to be 55 mmHg.    Compared to the images of the prior study done 3/21/2013 -  there has   been no significant change.       Transthoracic Echo Report 6/1/17  Prior echo 1-31-16.Normal left ventricular chamber size.  Mild left ventricular hypertrophy.  Left ventricular ejection fraction is visually estimated to be 35%.  Global hypokinesis with regional variation.  Grade IV diastolic dysfunction (restrictive pattern).  Pacer/ICD wire seen in right ventricle.  Pacer/ICD wire seen in right atrium.  Aortic sclerosis without stenosis.  Moderate aortic insufficiency.  Mitral annular calcification.  Moderate tricuspid regurgitation.  Estimated right ventricular systolic pressure  is 65 mmHg.  Right heart pressures are consistent with moderate pulmonary   hypertension.  Mild pulmonic insufficiency.  Normal pericardium without effusion.    Assessment:     1. Stage C chronic systolic congestive heart failure (CMS-HCC)     2. Heart failure, NYHA class 2 (CMS-HCC)     3. Dilated cardiomyopathy (CMS-HCC)     4. LBBB (left bundle branch block)     5. Presence of biventricular AICD     6. Ventricular tachycardia (CMS-HCC)     7. Essential hypertension     8. Other type of  follicular lymphoma, unspecified body region (CMS-HCC)         Medical Decision Making:  Today's Assessment / Status / Plan:   1. HFrEF, Stage C, Class 2: Based on physical examination findings, patient is euvolemic. No JVD, lungs are clear to auscultation, no pitting edema in bilateral lower extremities, no ascites.  -try to Decrease lasix to daily dosing  -Continue Entresto 24-26 mg BID (unable to increase dose today due to low BP, will have pt return in 2 weeks for med adjustment)  -Continue carvedilol 25 mg twice a day  -Continue digoxin 125 mg daily  -Get BMP today  -Has BiV ICD  -Reinforced s/sx of worsening heart failure with patient and weight monitoring. Pt verbalizes understanding. Pt to call office or RTC if present.     2. VT, s/p ICD:  -Followed by EP, Dr. Alexis    3. HTN: BP low, pt asymptomatic  -Encouraged adequate hydration  -Report symptoms to office, monitor and log BP at home. If decreasing please call office.    4. Lymphoma:  -Defer, pt has follow up with Dr. Rodríguez on 10/2/17.    FU in clinic in 2 weeks with myself. Sooner if needed.    Patient verbalizes understanding and agrees with the plan of care.     Collaborating MD: Judson Barajas MD

## 2017-10-02 ENCOUNTER — HOSPITAL ENCOUNTER (OUTPATIENT)
Dept: LAB | Facility: MEDICAL CENTER | Age: 67
End: 2017-10-02
Attending: NURSE PRACTITIONER
Payer: MEDICARE

## 2017-10-02 ENCOUNTER — TELEPHONE (OUTPATIENT)
Dept: CARDIOLOGY | Facility: MEDICAL CENTER | Age: 67
End: 2017-10-02

## 2017-10-02 DIAGNOSIS — I50.9 HEART FAILURE, NYHA CLASS 2 (HCC): ICD-10-CM

## 2017-10-02 DIAGNOSIS — I50.20 ACC/AHA STAGE C SYSTOLIC HEART FAILURE (HCC): ICD-10-CM

## 2017-10-02 LAB
ANION GAP SERPL CALC-SCNC: 6 MMOL/L (ref 0–11.9)
BUN SERPL-MCNC: 12 MG/DL (ref 8–22)
CALCIUM SERPL-MCNC: 10 MG/DL (ref 8.5–10.5)
CHLORIDE SERPL-SCNC: 103 MMOL/L (ref 96–112)
CO2 SERPL-SCNC: 31 MMOL/L (ref 20–33)
CREAT SERPL-MCNC: 0.77 MG/DL (ref 0.5–1.4)
GFR SERPL CREATININE-BSD FRML MDRD: >60 ML/MIN/1.73 M 2
GLUCOSE SERPL-MCNC: 103 MG/DL (ref 65–99)
POTASSIUM SERPL-SCNC: 4.8 MMOL/L (ref 3.6–5.5)
SODIUM SERPL-SCNC: 140 MMOL/L (ref 135–145)

## 2017-10-02 PROCEDURE — 36415 COLL VENOUS BLD VENIPUNCTURE: CPT

## 2017-10-02 PROCEDURE — 80048 BASIC METABOLIC PNL TOTAL CA: CPT

## 2017-10-02 NOTE — TELEPHONE ENCOUNTER
Message Contents   Elsy Wilson R.N.             MEGHA/Aline     Patient saw Shoshana Ilsa on 09/28 and was told she was going to order lab work for her, but hasn't been done yet. She can be reached at [phone]         Patient Calls     JENN Fuentes   You 18 minutes ago (11:45 AM)      I ordered a BMP, pt can go [facility] any [facility], non fasting. Thank You (Routing comment)      Returned patient call. Pt informed of above. Pt appreciative of call back. Pt informed she can make a lab appt online or by calling [phone].

## 2017-10-17 ENCOUNTER — OFFICE VISIT (OUTPATIENT)
Dept: CARDIOLOGY | Facility: MEDICAL CENTER | Age: 67
End: 2017-10-17
Payer: MEDICARE

## 2017-10-17 VITALS
WEIGHT: 162 LBS | OXYGEN SATURATION: 97 % | HEIGHT: 62 IN | DIASTOLIC BLOOD PRESSURE: 60 MMHG | BODY MASS INDEX: 29.81 KG/M2 | SYSTOLIC BLOOD PRESSURE: 100 MMHG | HEART RATE: 88 BPM

## 2017-10-17 DIAGNOSIS — C82.80 OTHER TYPE OF FOLLICULAR LYMPHOMA, UNSPECIFIED BODY REGION (HCC): ICD-10-CM

## 2017-10-17 DIAGNOSIS — I47.20 VENTRICULAR TACHYCARDIA (HCC): ICD-10-CM

## 2017-10-17 DIAGNOSIS — I10 ESSENTIAL HYPERTENSION: ICD-10-CM

## 2017-10-17 DIAGNOSIS — I42.0 DILATED CARDIOMYOPATHY (HCC): ICD-10-CM

## 2017-10-17 DIAGNOSIS — Z95.810 PRESENCE OF BIVENTRICULAR AICD: ICD-10-CM

## 2017-10-17 DIAGNOSIS — I50.22 STAGE C CHRONIC SYSTOLIC CONGESTIVE HEART FAILURE (HCC): ICD-10-CM

## 2017-10-17 DIAGNOSIS — I50.9 HEART FAILURE, NYHA CLASS 2 (HCC): ICD-10-CM

## 2017-10-17 PROCEDURE — 99214 OFFICE O/P EST MOD 30 MIN: CPT | Performed by: NURSE PRACTITIONER

## 2017-10-17 ASSESSMENT — ENCOUNTER SYMPTOMS
PALPITATIONS: 0
CLAUDICATION: 0
MYALGIAS: 0
PND: 0
FEVER: 0
SHORTNESS OF BREATH: 1
ABDOMINAL PAIN: 0
COUGH: 0
DIZZINESS: 0
ORTHOPNEA: 0

## 2017-10-17 NOTE — PROGRESS NOTES
Subjective:   Isatu Dang is a 66 y.o. female who presents today for follow-up on her heart failure.    Patient of Dr. Alexis. Patient was last seen in the heart failure clinic on 9/29/17.  Patient with heart failure with reduced ejection fraction. During her last visit, patient was continued on ARNI- Entresto 24-26 mg BID and she was able to decrease furosemide down to 40 mg daily. Patient reports she has had no problems with the medication changes.     For her symptoms, patient continues to have shortness of breath on occasion with ADLs and with exertion, occasional fatigue. Patient denies chest pain, palpitations, dizziness/lightheadedness, orthopnea, PND or edema.    Her home weights have been stable between 161-162 pounds. Patient has not seen a 3 pound weight gain one day or 5 pound weight gain in one week    Additonally, patient has the following medical problems:    -Hx lymphoma, followed by Oncology.    -L BBB    -BiV ICD for hx VT    -HTN: takes carvedilol 25 mg BID    -COPD: uses inhalers    -Sleep apnea: uses CPAP    -Depression: takes Citalopram    -Diabetes: takes metformin      Past Medical History:   Diagnosis Date   • Lung cancer (CMS-HCC) 2012        • Cancer (CMS-HCC) 2012    Lymphoma to neck   • Pain 2011    Legs pain controled on meds   • Hypertension 1998    Pt states bp runs low on current meds   • Myocardial infarct 1998   • Cardiac arrest (CMS-HCC) 1998        • Arrhythmia    • Bowel habit changes     Diarrhea.   • CATARACT     Beginning stages no surgery yet   • Congestive heart failure (CHF) (CMS-HCC)    • Dental disorder     Upper/Lower dentures.   • Depression    • Diabetes (CMS-HCC)    • Dilated cardiomyopathy    • Hypercholesteremia    • Indigestion    • Joint replacement         • LBBB (left bundle branch block)    • Neuropathy    • Obesity    • Peripheral neuropathy (CMS-HCC)    • Renal cyst, acquired, left      History of benign cystic mass on the lower left kidney.   • Sleep  apnea    • Unspecified hemorrhagic conditions     from aspirin   • Ventricular tachycardia (CMS-HCC)      Past Surgical History:   Procedure Laterality Date   • AICD BATTERY CHANGE  November 2016    Generator replacement with Medtronic Viva S CRT-D [id number] implanted by Dr. Alexis. Original device implanted in 1998   • LARYNGOSCOPY  11/14/2013    Performed by Judson Willis M.D. at SURGERY SAME DAY [facility]   • BIOPSY GENERAL  11/14/2013    Performed by Judson Willis M.D. at SURGERY SAME DAY [facility]   • THORACOSCOPY ROBOTIC  8/27/2012    Performed by GANSER, JOHN H at SURGERY [facility] ORS   • RECOVERY  7/23/2012    Performed by SURGERY, IR-RECOVERY at SURGERY SAME DAY [facility]   • BONE MARROW ASPIRATION  6/22/2012    Performed by ISAIAS EVERETT at ENDOSCOPY [facility]   • BONE MARROW BIOPSY, NDL/TROCAR  6/22/2012    Performed by ISAIAS EVERETT at ENDOSCOPY [facility] ORS   • NECK MASS EXCISION  5/24/2012    Performed by JUDSON WILLIS at SURGERY SAME DAY [facility] ORS   • RECOVERY  10/21/2011    Performed by CHARLES, CATH-RECOVERY at SURGERY SAME DAY [facility] ORS   • CARPAL TUNNEL RELEASE  4/3/08    Performed by DEEP GREEN at SURGERY [facility]   • LIPOMA EXCISION  4/3/08    Performed by DEEP GREEN at [facility]   • CARPAL TUNNEL RELEASE Right 05/07        • HYSTERECTOMY, TOTAL ABDOMINAL  1995   • HYSTERECTOMY, TOTAL ABDOMINAL          • IMPLANTABLE CARDIOVERTER DEFIBRILLATOR (ICD)  4/1/98, 03/2007, 10/2011   • TONSILLECTOMY AND ADENOIDECTOMY            Family History   Problem Relation Age of Onset   • Diabetes Mother    • Cancer Mother    • Diabetes Sister    • Diabetes Maternal Grandmother      History   Smoking Status   • Former Smoker   • Packs/day: 2.00   • Years: 30.00   • Types: Cigarettes   • Quit date: 10/20/1998   Smokeless Tobacco   • Never Used     Allergies   Allergen Reactions   • Ace Inhibitors      cough   • Tape Contact Dermatitis      Outpatient Encounter Prescriptions as of 10/17/2017   Medication Sig Dispense Refill   • loperamide (IMODIUM) 2 MG Cap Take 2 mg by mouth 4 times a day as needed for Diarrhea.     • sacubitril-valsartan (ENTRESTO) 24-26 MG Tab tablet Take 1 Tab by mouth 2 Times a Day. 60 Tab 11   • zolpidem (AMBIEN) 10 MG Tab TAKE ONE TABLET BY MOUTH AT BEDTIME, as needed prn insomnia 90 Tab 1   • albuterol (PROAIR HFA) 108 (90 BASE) MCG/ACT Aero Soln inhalation aerosol Inhale 2 Puffs by mouth every 6 hours as needed for Shortness of Breath. 3 Inhaler 3   • carvedilol (COREG) 25 MG Tab Take 1 Tab by mouth 2 times a day, with meals. 180 Tab 3   • digoxin (LANOXIN) 125 MCG Tab Take 1 Tab by mouth every day. 90 Tab 3   • furosemide (LASIX) 40 MG Tab Take 1 Tab by mouth 2 Times a Day. (Patient taking differently: Take 40 mg by mouth every day.) 180 Tab 3   • potassium chloride SA (K-DUR) 10 MEQ Tab CR Take 1 Tab by mouth every day. 90 Tab 3   • Tiotropium Bromide Monohydrate (SPIRIVA RESPIMAT) 2.5 MCG/ACT Aero Soln Inhale 2 Inhalation by mouth every day. Assemble and prime. 3 Inhaler 3   • Cyanocobalamin (VITAMIN B-12 SL) Place  under tongue every day.     • citalopram (CELEXA) 20 MG Tab TAKE ONE TABLET BY MOUTH ONCE DAILY 90 Tab 3   • metformin (GLUCOPHAGE) 1000 MG tablet Take 1,000 mg by mouth 2 times a day, with meals.     • aspirin (ASA) 325 MG TABS Take 325 mg by mouth every day.     • Multiple Vitamin (MULTI-VITAMINS) TABS Take 1 Tab by mouth every day.     • Calcium Carbonate-Vitamin D (CALCIUM 600+D) 600-200 MG-UNIT TABS Take 2 Tabs by mouth every day.     • allopurinol (ZYLOPRIM) 300 MG TABS Take 300 mg by mouth every day.     • gabapentin (NEURONTIN) 300 MG CAPS Take 600 mg by mouth 3 times a day.     • Omega-3 Fatty Acids (FISH OIL) 300 MG CAPS Take 1 Tab by mouth every day.       No facility-administered encounter medications on file as of 10/17/2017.      Review of Systems   Constitutional: Positive for malaise/fatigue.  "Negative for fever.   Respiratory: Positive for shortness of breath. Negative for cough.    Cardiovascular: Negative for chest pain, palpitations, orthopnea, claudication, leg swelling and PND.   Gastrointestinal: Negative for abdominal pain.   Musculoskeletal: Negative for myalgias.   Neurological: Negative for dizziness.   All other systems reviewed and are negative.       Objective:   /60   Pulse 88   Ht 1.575 m (5' 2\")   Wt 73.5 kg (162 lb)   SpO2 97%   BMI 29.63 kg/m²     Physical Exam   Constitutional: She is oriented to person, place, and time. She appears well-developed and well-nourished.   HENT:   Head: Normocephalic and atraumatic.   Eyes: EOM are normal.   Neck: Normal range of motion. Neck supple. No JVD present.   Cardiovascular: Normal rate, regular rhythm, normal heart sounds and intact distal pulses.    No murmur heard.  Pulmonary/Chest: Effort normal and breath sounds normal. No respiratory distress. She has no wheezes. She has no rales.   Left Chest ICD-no erosion, drainage or erythema.   Abdominal: Soft. Bowel sounds are normal.   Musculoskeletal: Normal range of motion. She exhibits no edema.   Neurological: She is alert and oriented to person, place, and time.   Skin: Skin is warm and dry.   Psychiatric: She has a normal mood and affect.   Nursing note and vitals reviewed.    Lab Results   Component Value Date/Time    CHOLSTRLTOT 158 01/18/2016 08:41 AM    LDL 84 01/18/2016 08:41 AM    HDL 44 01/18/2016 08:41 AM    TRIGLYCERIDE 148 01/18/2016 08:41 AM       Lab Results   Component Value Date/Time    SODIUM 140 10/02/2017 12:57 PM    POTASSIUM 4.8 10/02/2017 12:57 PM    CHLORIDE 103 10/02/2017 12:57 PM    CO2 31 10/02/2017 12:57 PM    GLUCOSE 103 (H) 10/02/2017 12:57 PM    BUN 12 10/02/2017 12:57 PM    CREATININE 0.77 10/02/2017 12:57 PM    CREATININE 0.7 03/25/2008 10:20 AM     Lab Results   Component Value Date/Time    ALKPHOSPHAT 65 09/01/2017 06:14 AM    ASTSGOT 27 09/01/2017 " 06:14 AM    ALTSGPT 17 09/01/2017 06:14 AM    TBILIRUBIN 0.5 09/01/2017 06:14 AM      Transthoracic Echo Report 1/31/16    Left ventricle is moderately dilated.  Moderately reduced left ventricular systolic function.  Left ventricular ejection fraction is visually estimated to be 35%.  Global hypokinesis with regional variation.  Grade III diastolic dysfunction (restrictive pattern).  Moderate mitral regurgitation.  Moderate to severe aortic insufficiency.    Mild tricuspid regurgitation.  Right ventricular systolic pressure is estimated to be 55 mmHg.    Compared to the images of the prior study done 3/21/2013 -  there has   been no significant change.       Transthoracic Echo Report 6/1/17  Prior echo 1-31-16.Normal left ventricular chamber size.  Mild left ventricular hypertrophy.  Left ventricular ejection fraction is visually estimated to be 35%.  Global hypokinesis with regional variation.  Grade IV diastolic dysfunction (restrictive pattern).  Pacer/ICD wire seen in right ventricle.  Pacer/ICD wire seen in right atrium.  Aortic sclerosis without stenosis.  Moderate aortic insufficiency.  Mitral annular calcification.  Moderate tricuspid regurgitation.  Estimated right ventricular systolic pressure  is 65 mmHg.  Right heart pressures are consistent with moderate pulmonary   hypertension.  Mild pulmonic insufficiency.  Normal pericardium without effusion.    Assessment:     1. Stage C chronic systolic congestive heart failure (CMS-HCC)  sacubitril-valsartan (ENTRESTO) 24-26 MG Tab tablet    Echocardiogram Comp w/o Cont    CANCELED: Echocardiogram Comp w/o Cont   2. Heart failure, NYHA class 2 (CMS-HCC)  sacubitril-valsartan (ENTRESTO) 24-26 MG Tab tablet    Echocardiogram Comp w/o Cont    CANCELED: Echocardiogram Comp w/o Cont   3. Dilated cardiomyopathy (CMS-HCC)     4. Ventricular tachycardia (CMS-HCC)     5. Presence of biventricular AICD     6. Essential hypertension     7. Other type of follicular  lymphoma, unspecified body region (CMS-HCC)         Medical Decision Making:  Today's Assessment / Status / Plan:   1. HFrEF, Stage C, Class 2: Based on physical examination findings, patient is euvolemic. No JVD, lungs are clear to auscultation, no pitting edema in bilateral lower extremities, no ascites.  -Continue Entresto 24-26 mg BID (unable to increase dose today due to low BP)  -Continue furosemide 40 mg daily with KCL 10 mEq daily  -Continue carvedilol 25 mg twice a day  -Continue digoxin 125 mg daily  -Repeat echo in 2 months  -Has BiV ICD  -Reinforced s/sx of worsening heart failure with patient and weight monitoring. Pt verbalizes understanding. Pt to call office or RTC if present.     2. VT, s/p ICD:  -Followed by EP, Dr. Alexis    3. HTN: BP low, pt asymptomatic  -Encouraged adequate hydration  -Report symptoms to office, monitor and log BP at home. If decreasing please call office.    4. Lymphoma:  -Defer, pt is following with Dr. Rodríguez.    FU in clinic in 2 months with Dr. Betancourt. Sooner if needed.    Patient verbalizes understanding and agrees with the plan of care.     Collaborating MD: Radha Damon MD

## 2017-10-17 NOTE — LETTER
[facility]   [address], [address]  [place] [address]  Phone: [phone]  Fax: [phone]              Isatu Dang  1950    Encounter Date: 10/17/2017    JENN Fuentes          PROGRESS NOTE:  Subjective:   Isatu Dang is a 66 y.o. female who presents today for follow-up on her heart failure.    Patient of Dr. Alexis. Patient was last seen in the heart failure clinic on 9/29/17.  Patient with heart failure with reduced ejection fraction. During her last visit, patient was continued on ARNI- Entresto 24-26 mg BID and she was able to decrease furosemide down to 40 mg daily. Patient reports she has had no problems with the medication changes.     For her symptoms, patient continues to have shortness of breath on occasion with ADLs and with exertion, occasional fatigue. Patient denies chest pain, palpitations, dizziness/lightheadedness, orthopnea, PND or edema.    Her home weights have been stable between 161-162 pounds. Patient has not seen a 3 pound weight gain one day or 5 pound weight gain in one week    Additonally, patient has the following medical problems:    -Hx lymphoma, followed by Oncology.    -L BBB    -BiV ICD for hx VT    -HTN: takes carvedilol 25 mg BID    -COPD: uses inhalers    -Sleep apnea: uses CPAP    -Depression: takes Citalopram    -Diabetes: takes metformin      Past Medical History:   Diagnosis Date   • Lung cancer (CMS-HCC) 2012        • Cancer (CMS-HCC) 2012    Lymphoma to neck   • Pain 2011    Legs pain controled on meds   • Hypertension 1998    Pt states bp runs low on current meds   • Myocardial infarct 1998   • Cardiac arrest (CMS-HCC) 1998        • Arrhythmia    • Bowel habit changes     Diarrhea.   • CATARACT     Beginning stages no surgery yet   • Congestive heart failure (CHF) (CMS-HCC)    • Dental disorder     Upper/Lower dentures.   • Depression    • Diabetes (CMS-HCC)    • Dilated cardiomyopathy    •  Hypercholesteremia    • Indigestion    • Joint replacement         • LBBB (left bundle branch block)    • Neuropathy    • Obesity    • Peripheral neuropathy (CMS-HCC)    • Renal cyst, acquired, left      History of benign cystic mass on the lower left kidney.   • Sleep apnea    • Unspecified hemorrhagic conditions     from aspirin   • Ventricular tachycardia (CMS-HCC)      Past Surgical History:   Procedure Laterality Date   • AICD BATTERY CHANGE  November 2016    Generator replacement with Medtronic Viva S CRT-D [id number] implanted by Dr. Alexis. Original device implanted in 1998   • LARYNGOSCOPY  11/14/2013    Performed by Judson Willis M.D. at SURGERY SAME DAY [facility] ORS   • BIOPSY GENERAL  11/14/2013    Performed by Judson Willis M.D. at SURGERY SAME DAY [facility] ORS   • THORACOSCOPY ROBOTIC  8/27/2012    Performed by GANSER, JOHN H at SURGERY [facility]S   • RECOVERY  7/23/2012    Performed by SURGERY, IR-RECOVERY at SURGERY SAME DAY [facility] ORS   • BONE MARROW ASPIRATION  6/22/2012    Performed by ISAIAS EVERETT at ENDOSCOPY [facility] ORS   • BONE MARROW BIOPSY, NDL/TROCAR  6/22/2012    Performed by ISAIAS EVERETT at ENDOSCOPY [facility] ORS   • NECK MASS EXCISION  5/24/2012    Performed by JUDSON WILLIS at SURGERY SAME DAY [facility] ORS   • RECOVERY  10/21/2011    Performed by SURGERY, CATH-RECOVERY at SURGERY SAME DAY [facility] ORS   • CARPAL TUNNEL RELEASE  4/3/08    Performed by DEEP GREEN at SURGERY [facility]   • LIPOMA EXCISION  4/3/08    Performed by DEEP GREEN at [facility]   • CARPAL TUNNEL RELEASE Right 05/07        • HYSTERECTOMY, TOTAL ABDOMINAL  1995   • HYSTERECTOMY, TOTAL ABDOMINAL          • IMPLANTABLE CARDIOVERTER DEFIBRILLATOR (ICD)  4/1/98, 03/2007, 10/2011   • TONSILLECTOMY AND ADENOIDECTOMY            Family History   Problem Relation Age of Onset   • Diabetes Mother    • Cancer Mother    • Diabetes Sister    • Diabetes Maternal  Grandmother      History   Smoking Status   • Former Smoker   • Packs/day: 2.00   • Years: 30.00   • Types: Cigarettes   • Quit date: 10/20/1998   Smokeless Tobacco   • Never Used     Allergies   Allergen Reactions   • Ace Inhibitors      cough   • Tape Contact Dermatitis     Outpatient Encounter Prescriptions as of 10/17/2017   Medication Sig Dispense Refill   • loperamide (IMODIUM) 2 MG Cap Take 2 mg by mouth 4 times a day as needed for Diarrhea.     • sacubitril-valsartan (ENTRESTO) 24-26 MG Tab tablet Take 1 Tab by mouth 2 Times a Day. 60 Tab 11   • zolpidem (AMBIEN) 10 MG Tab TAKE ONE TABLET BY MOUTH AT BEDTIME, as needed prn insomnia 90 Tab 1   • albuterol (PROAIR HFA) 108 (90 BASE) MCG/ACT Aero Soln inhalation aerosol Inhale 2 Puffs by mouth every 6 hours as needed for Shortness of Breath. 3 Inhaler 3   • carvedilol (COREG) 25 MG Tab Take 1 Tab by mouth 2 times a day, with meals. 180 Tab 3   • digoxin (LANOXIN) 125 MCG Tab Take 1 Tab by mouth every day. 90 Tab 3   • furosemide (LASIX) 40 MG Tab Take 1 Tab by mouth 2 Times a Day. (Patient taking differently: Take 40 mg by mouth every day.) 180 Tab 3   • potassium chloride SA (K-DUR) 10 MEQ Tab CR Take 1 Tab by mouth every day. 90 Tab 3   • Tiotropium Bromide Monohydrate (SPIRIVA RESPIMAT) 2.5 MCG/ACT Aero Soln Inhale 2 Inhalation by mouth every day. Assemble and prime. 3 Inhaler 3   • Cyanocobalamin (VITAMIN B-12 SL) Place  under tongue every day.     • citalopram (CELEXA) 20 MG Tab TAKE ONE TABLET BY MOUTH ONCE DAILY 90 Tab 3   • metformin (GLUCOPHAGE) 1000 MG tablet Take 1,000 mg by mouth 2 times a day, with meals.     • aspirin (ASA) 325 MG TABS Take 325 mg by mouth every day.     • Multiple Vitamin (MULTI-VITAMINS) TABS Take 1 Tab by mouth every day.     • Calcium Carbonate-Vitamin D (CALCIUM 600+D) 600-200 MG-UNIT TABS Take 2 Tabs by mouth every day.     • allopurinol (ZYLOPRIM) 300 MG TABS Take 300 mg by mouth every day.     • gabapentin (NEURONTIN) 300  "MG CAPS Take 600 mg by mouth 3 times a day.     • Omega-3 Fatty Acids (FISH OIL) 300 MG CAPS Take 1 Tab by mouth every day.       No facility-administered encounter medications on file as of 10/17/2017.      Review of Systems   Constitutional: Positive for malaise/fatigue. Negative for fever.   Respiratory: Positive for shortness of breath. Negative for cough.    Cardiovascular: Negative for chest pain, palpitations, orthopnea, claudication, leg swelling and PND.   Gastrointestinal: Negative for abdominal pain.   Musculoskeletal: Negative for myalgias.   Neurological: Negative for dizziness.   All other systems reviewed and are negative.       Objective:   /60   Pulse 88   Ht 1.575 m (5' 2\")   Wt 73.5 kg (162 lb)   SpO2 97%   BMI 29.63 kg/m²      Physical Exam   Constitutional: She is oriented to person, place, and time. She appears well-developed and well-nourished.   HENT:   Head: Normocephalic and atraumatic.   Eyes: EOM are normal.   Neck: Normal range of motion. Neck supple. No JVD present.   Cardiovascular: Normal rate, regular rhythm, normal heart sounds and intact distal pulses.    No murmur heard.  Pulmonary/Chest: Effort normal and breath sounds normal. No respiratory distress. She has no wheezes. She has no rales.   Left Chest ICD-no erosion, drainage or erythema.   Abdominal: Soft. Bowel sounds are normal.   Musculoskeletal: Normal range of motion. She exhibits no edema.   Neurological: She is alert and oriented to person, place, and time.   Skin: Skin is warm and dry.   Psychiatric: She has a normal mood and affect.   Nursing note and vitals reviewed.    Lab Results   Component Value Date/Time    CHOLSTRLTOT 158 01/18/2016 08:41 AM    LDL 84 01/18/2016 08:41 AM    HDL 44 01/18/2016 08:41 AM    TRIGLYCERIDE 148 01/18/2016 08:41 AM       Lab Results   Component Value Date/Time    SODIUM 140 10/02/2017 12:57 PM    POTASSIUM 4.8 10/02/2017 12:57 PM    CHLORIDE 103 10/02/2017 12:57 PM    CO2 31 " 10/02/2017 12:57 PM    GLUCOSE 103 (H) 10/02/2017 12:57 PM    BUN 12 10/02/2017 12:57 PM    CREATININE 0.77 10/02/2017 12:57 PM    CREATININE 0.7 03/25/2008 10:20 AM     Lab Results   Component Value Date/Time    ALKPHOSPHAT 65 09/01/2017 06:14 AM    ASTSGOT 27 09/01/2017 06:14 AM    ALTSGPT 17 09/01/2017 06:14 AM    TBILIRUBIN 0.5 09/01/2017 06:14 AM      Transthoracic Echo Report 1/31/16    Left ventricle is moderately dilated.  Moderately reduced left ventricular systolic function.  Left ventricular ejection fraction is visually estimated to be 35%.  Global hypokinesis with regional variation.  Grade III diastolic dysfunction (restrictive pattern).  Moderate mitral regurgitation.  Moderate to severe aortic insufficiency.    Mild tricuspid regurgitation.  Right ventricular systolic pressure is estimated to be 55 mmHg.    Compared to the images of the prior study done 3/21/2013 -  there has   been no significant change.       Transthoracic Echo Report 6/1/17  Prior echo 1-31-16.Normal left ventricular chamber size.  Mild left ventricular hypertrophy.  Left ventricular ejection fraction is visually estimated to be 35%.  Global hypokinesis with regional variation.  Grade IV diastolic dysfunction (restrictive pattern).  Pacer/ICD wire seen in right ventricle.  Pacer/ICD wire seen in right atrium.  Aortic sclerosis without stenosis.  Moderate aortic insufficiency.  Mitral annular calcification.  Moderate tricuspid regurgitation.  Estimated right ventricular systolic pressure  is 65 mmHg.  Right heart pressures are consistent with moderate pulmonary   hypertension.  Mild pulmonic insufficiency.  Normal pericardium without effusion.    Assessment:     1. Stage C chronic systolic congestive heart failure (CMS-HCC)  sacubitril-valsartan (ENTRESTO) 24-26 MG Tab tablet    Echocardiogram Comp w/o Cont    CANCELED: Echocardiogram Comp w/o Cont   2. Heart failure, NYHA class 2 (CMS-HCC)  sacubitril-valsartan (ENTRESTO) 24-26 MG  Tab tablet    Echocardiogram Comp w/o Cont    CANCELED: Echocardiogram Comp w/o Cont   3. Dilated cardiomyopathy (CMS-HCC)     4. Ventricular tachycardia (CMS-HCC)     5. Presence of biventricular AICD     6. Essential hypertension     7. Other type of follicular lymphoma, unspecified body region (CMS-HCC)         Medical Decision Making:  Today's Assessment / Status / Plan:   1. HFrEF, Stage C, Class 2: Based on physical examination findings, patient is euvolemic. No JVD, lungs are clear to auscultation, no pitting edema in bilateral lower extremities, no ascites.  -Continue Entresto 24-26 mg BID (unable to increase dose today due to low BP)  -Continue furosemide 40 mg daily with KCL 10 mEq daily  -Continue carvedilol 25 mg twice a day  -Continue digoxin 125 mg daily  -Repeat echo in 2 months  -Has BiV ICD  -Reinforced s/sx of worsening heart failure with patient and weight monitoring. Pt verbalizes understanding. Pt to call office or RTC if present.     2. VT, s/p ICD:  -Followed by EP, Dr. Alexis    3. HTN: BP low, pt asymptomatic  -Encouraged adequate hydration  -Report symptoms to office, monitor and log BP at home. If decreasing please call office.    4. Lymphoma:  -Defer, pt is following with Dr. Rodríguez.    FU in clinic in 2 months with Dr. Betancourt. Sooner if needed.    Patient verbalizes understanding and agrees with the plan of care.     Collaborating MD: MD Edwin Rodriguez M.D.  [id number]  [address]  [place] [address]  VIA Facsimile: [phone]

## 2017-10-18 RX ORDER — CITALOPRAM 20 MG/1
TABLET ORAL
Qty: 90 TAB | Refills: 3 | Status: SHIPPED | OUTPATIENT
Start: 2017-10-18 | End: 2017-11-16

## 2017-11-05 ENCOUNTER — APPOINTMENT (OUTPATIENT)
Dept: RADIOLOGY | Facility: MEDICAL CENTER | Age: 67
DRG: 291 | End: 2017-11-05
Attending: EMERGENCY MEDICINE
Payer: MEDICARE

## 2017-11-05 ENCOUNTER — HOSPITAL ENCOUNTER (INPATIENT)
Facility: MEDICAL CENTER | Age: 67
LOS: 5 days | DRG: 291 | End: 2017-11-10
Attending: EMERGENCY MEDICINE | Admitting: INTERNAL MEDICINE
Payer: MEDICARE

## 2017-11-05 ENCOUNTER — RESOLUTE PROFESSIONAL BILLING HOSPITAL PROF FEE (OUTPATIENT)
Dept: HOSPITALIST | Facility: MEDICAL CENTER | Age: 67
End: 2017-11-05
Payer: MEDICARE

## 2017-11-05 DIAGNOSIS — I50.9 ACUTE ON CHRONIC CONGESTIVE HEART FAILURE, UNSPECIFIED CONGESTIVE HEART FAILURE TYPE: ICD-10-CM

## 2017-11-05 PROBLEM — I50.43 ACUTE ON CHRONIC COMBINED SYSTOLIC AND DIASTOLIC CHF (CONGESTIVE HEART FAILURE) (HCC): Status: ACTIVE | Noted: 2017-11-05

## 2017-11-05 LAB
ALBUMIN SERPL BCP-MCNC: 4.1 G/DL (ref 3.2–4.9)
ALBUMIN/GLOB SERPL: 1.4 G/DL
ALP SERPL-CCNC: 56 U/L (ref 30–99)
ALT SERPL-CCNC: 21 U/L (ref 2–50)
ANION GAP SERPL CALC-SCNC: 7 MMOL/L (ref 0–11.9)
APTT PPP: 30.3 SEC (ref 24.7–36)
AST SERPL-CCNC: 33 U/L (ref 12–45)
BASOPHILS # BLD AUTO: 0.6 % (ref 0–1.8)
BASOPHILS # BLD: 0.05 K/UL (ref 0–0.12)
BILIRUB SERPL-MCNC: 0.7 MG/DL (ref 0.1–1.5)
BNP SERPL-MCNC: 1604 PG/ML (ref 0–100)
BUN SERPL-MCNC: 16 MG/DL (ref 8–22)
CALCIUM SERPL-MCNC: 10 MG/DL (ref 8.5–10.5)
CHLORIDE SERPL-SCNC: 101 MMOL/L (ref 96–112)
CO2 SERPL-SCNC: 27 MMOL/L (ref 20–33)
CREAT SERPL-MCNC: 0.9 MG/DL (ref 0.5–1.4)
EKG IMPRESSION: NORMAL
EOSINOPHIL # BLD AUTO: 0.31 K/UL (ref 0–0.51)
EOSINOPHIL NFR BLD: 3.6 % (ref 0–6.9)
ERYTHROCYTE [DISTWIDTH] IN BLOOD BY AUTOMATED COUNT: 48.2 FL (ref 35.9–50)
GFR SERPL CREATININE-BSD FRML MDRD: >60 ML/MIN/1.73 M 2
GLOBULIN SER CALC-MCNC: 2.9 G/DL (ref 1.9–3.5)
GLUCOSE BLD-MCNC: 98 MG/DL (ref 65–99)
GLUCOSE SERPL-MCNC: 101 MG/DL (ref 65–99)
HCT VFR BLD AUTO: 45.9 % (ref 37–47)
HGB BLD-MCNC: 15.8 G/DL (ref 12–16)
IMM GRANULOCYTES # BLD AUTO: 0.03 K/UL (ref 0–0.11)
IMM GRANULOCYTES NFR BLD AUTO: 0.3 % (ref 0–0.9)
INR PPP: 1.14 (ref 0.87–1.13)
LYMPHOCYTES # BLD AUTO: 1.2 K/UL (ref 1–4.8)
LYMPHOCYTES NFR BLD: 13.7 % (ref 22–41)
MCH RBC QN AUTO: 31.2 PG (ref 27–33)
MCHC RBC AUTO-ENTMCNC: 34.4 G/DL (ref 33.6–35)
MCV RBC AUTO: 90.7 FL (ref 81.4–97.8)
MONOCYTES # BLD AUTO: 0.57 K/UL (ref 0–0.85)
MONOCYTES NFR BLD AUTO: 6.5 % (ref 0–13.4)
NEUTROPHILS # BLD AUTO: 6.57 K/UL (ref 2–7.15)
NEUTROPHILS NFR BLD: 75.3 % (ref 44–72)
NRBC # BLD AUTO: 0 K/UL
NRBC BLD AUTO-RTO: 0 /100 WBC
PLATELET # BLD AUTO: 237 K/UL (ref 164–446)
PMV BLD AUTO: 10.9 FL (ref 9–12.9)
POTASSIUM SERPL-SCNC: 4.6 MMOL/L (ref 3.6–5.5)
PROT SERPL-MCNC: 7 G/DL (ref 6–8.2)
PROTHROMBIN TIME: 14.3 SEC (ref 12–14.6)
RBC # BLD AUTO: 5.06 M/UL (ref 4.2–5.4)
SODIUM SERPL-SCNC: 135 MMOL/L (ref 135–145)
TROPONIN I SERPL-MCNC: <0.01 NG/ML (ref 0–0.04)
WBC # BLD AUTO: 8.7 K/UL (ref 4.8–10.8)

## 2017-11-05 PROCEDURE — 82962 GLUCOSE BLOOD TEST: CPT

## 2017-11-05 PROCEDURE — 93005 ELECTROCARDIOGRAM TRACING: CPT | Performed by: EMERGENCY MEDICINE

## 2017-11-05 PROCEDURE — 99285 EMERGENCY DEPT VISIT HI MDM: CPT

## 2017-11-05 PROCEDURE — 96374 THER/PROPH/DIAG INJ IV PUSH: CPT

## 2017-11-05 PROCEDURE — 700111 HCHG RX REV CODE 636 W/ 250 OVERRIDE (IP): Performed by: EMERGENCY MEDICINE

## 2017-11-05 PROCEDURE — 85025 COMPLETE CBC W/AUTO DIFF WBC: CPT

## 2017-11-05 PROCEDURE — 85610 PROTHROMBIN TIME: CPT

## 2017-11-05 PROCEDURE — 80053 COMPREHEN METABOLIC PANEL: CPT

## 2017-11-05 PROCEDURE — 36415 COLL VENOUS BLD VENIPUNCTURE: CPT

## 2017-11-05 PROCEDURE — 83880 ASSAY OF NATRIURETIC PEPTIDE: CPT

## 2017-11-05 PROCEDURE — 700111 HCHG RX REV CODE 636 W/ 250 OVERRIDE (IP): Performed by: INTERNAL MEDICINE

## 2017-11-05 PROCEDURE — 84484 ASSAY OF TROPONIN QUANT: CPT

## 2017-11-05 PROCEDURE — 770020 HCHG ROOM/CARE - TELE (206)

## 2017-11-05 PROCEDURE — 700102 HCHG RX REV CODE 250 W/ 637 OVERRIDE(OP): Performed by: INTERNAL MEDICINE

## 2017-11-05 PROCEDURE — 99223 1ST HOSP IP/OBS HIGH 75: CPT | Mod: AI | Performed by: INTERNAL MEDICINE

## 2017-11-05 PROCEDURE — 71010 DX-CHEST-PORTABLE (1 VIEW): CPT

## 2017-11-05 PROCEDURE — A9270 NON-COVERED ITEM OR SERVICE: HCPCS | Performed by: INTERNAL MEDICINE

## 2017-11-05 PROCEDURE — 85730 THROMBOPLASTIN TIME PARTIAL: CPT

## 2017-11-05 RX ORDER — LABETALOL HYDROCHLORIDE 5 MG/ML
10 INJECTION, SOLUTION INTRAVENOUS EVERY 4 HOURS PRN
Status: DISCONTINUED | OUTPATIENT
Start: 2017-11-05 | End: 2017-11-10 | Stop reason: HOSPADM

## 2017-11-05 RX ORDER — ONDANSETRON 2 MG/ML
4 INJECTION INTRAMUSCULAR; INTRAVENOUS EVERY 4 HOURS PRN
Status: DISCONTINUED | OUTPATIENT
Start: 2017-11-05 | End: 2017-11-10 | Stop reason: HOSPADM

## 2017-11-05 RX ORDER — POTASSIUM CHLORIDE 20 MEQ/1
20 TABLET, EXTENDED RELEASE ORAL 2 TIMES DAILY
Status: DISCONTINUED | OUTPATIENT
Start: 2017-11-05 | End: 2017-11-07

## 2017-11-05 RX ORDER — FUROSEMIDE 10 MG/ML
40 INJECTION INTRAMUSCULAR; INTRAVENOUS ONCE
Status: COMPLETED | OUTPATIENT
Start: 2017-11-05 | End: 2017-11-05

## 2017-11-05 RX ORDER — AMOXICILLIN 250 MG
2 CAPSULE ORAL 2 TIMES DAILY
Status: DISCONTINUED | OUTPATIENT
Start: 2017-11-05 | End: 2017-11-10 | Stop reason: HOSPADM

## 2017-11-05 RX ORDER — BISACODYL 10 MG
10 SUPPOSITORY, RECTAL RECTAL
Status: DISCONTINUED | OUTPATIENT
Start: 2017-11-05 | End: 2017-11-10 | Stop reason: HOSPADM

## 2017-11-05 RX ORDER — DEXTROSE MONOHYDRATE 25 G/50ML
25 INJECTION, SOLUTION INTRAVENOUS
Status: DISCONTINUED | OUTPATIENT
Start: 2017-11-05 | End: 2017-11-10 | Stop reason: HOSPADM

## 2017-11-05 RX ORDER — FUROSEMIDE 10 MG/ML
80 INJECTION INTRAMUSCULAR; INTRAVENOUS ONCE
Status: DISCONTINUED | OUTPATIENT
Start: 2017-11-05 | End: 2017-11-05

## 2017-11-05 RX ORDER — CITALOPRAM 20 MG/1
20 TABLET ORAL DAILY
Status: DISCONTINUED | OUTPATIENT
Start: 2017-11-05 | End: 2017-11-10 | Stop reason: HOSPADM

## 2017-11-05 RX ORDER — ZOLPIDEM TARTRATE 5 MG/1
5 TABLET ORAL
Status: DISCONTINUED | OUTPATIENT
Start: 2017-11-05 | End: 2017-11-10 | Stop reason: HOSPADM

## 2017-11-05 RX ORDER — ALLOPURINOL 300 MG/1
300 TABLET ORAL DAILY
Status: DISCONTINUED | OUTPATIENT
Start: 2017-11-06 | End: 2017-11-10 | Stop reason: HOSPADM

## 2017-11-05 RX ORDER — POLYETHYLENE GLYCOL 3350 17 G/17G
1 POWDER, FOR SOLUTION ORAL
Status: DISCONTINUED | OUTPATIENT
Start: 2017-11-05 | End: 2017-11-10 | Stop reason: HOSPADM

## 2017-11-05 RX ORDER — ONDANSETRON 4 MG/1
4 TABLET, ORALLY DISINTEGRATING ORAL EVERY 4 HOURS PRN
Status: DISCONTINUED | OUTPATIENT
Start: 2017-11-05 | End: 2017-11-10 | Stop reason: HOSPADM

## 2017-11-05 RX ORDER — HEPARIN SODIUM 5000 [USP'U]/ML
5000 INJECTION, SOLUTION INTRAVENOUS; SUBCUTANEOUS EVERY 8 HOURS
Status: DISCONTINUED | OUTPATIENT
Start: 2017-11-05 | End: 2017-11-10 | Stop reason: HOSPADM

## 2017-11-05 RX ORDER — GABAPENTIN 300 MG/1
600 CAPSULE ORAL 3 TIMES DAILY
Status: DISCONTINUED | OUTPATIENT
Start: 2017-11-05 | End: 2017-11-10 | Stop reason: HOSPADM

## 2017-11-05 RX ORDER — CARVEDILOL 25 MG/1
25 TABLET ORAL 2 TIMES DAILY WITH MEALS
Status: DISCONTINUED | OUTPATIENT
Start: 2017-11-05 | End: 2017-11-09

## 2017-11-05 RX ORDER — ACETAMINOPHEN 325 MG/1
650 TABLET ORAL EVERY 6 HOURS PRN
Status: DISCONTINUED | OUTPATIENT
Start: 2017-11-05 | End: 2017-11-10 | Stop reason: HOSPADM

## 2017-11-05 RX ORDER — ALBUTEROL SULFATE 90 UG/1
1-2 AEROSOL, METERED RESPIRATORY (INHALATION) EVERY 6 HOURS PRN
Status: DISCONTINUED | OUTPATIENT
Start: 2017-11-05 | End: 2017-11-10 | Stop reason: HOSPADM

## 2017-11-05 RX ORDER — TIOTROPIUM BROMIDE 18 UG/1
1 CAPSULE ORAL; RESPIRATORY (INHALATION) DAILY
Status: DISCONTINUED | OUTPATIENT
Start: 2017-11-06 | End: 2017-11-10 | Stop reason: HOSPADM

## 2017-11-05 RX ORDER — FUROSEMIDE 40 MG/1
40 TABLET ORAL DAILY
Status: ON HOLD | COMMUNITY
End: 2017-11-10

## 2017-11-05 RX ORDER — DIGOXIN 125 MCG
125 TABLET ORAL DAILY
Status: DISCONTINUED | OUTPATIENT
Start: 2017-11-06 | End: 2017-11-10 | Stop reason: HOSPADM

## 2017-11-05 RX ORDER — ASPIRIN 325 MG
325 TABLET ORAL DAILY
Status: DISCONTINUED | OUTPATIENT
Start: 2017-11-06 | End: 2017-11-10 | Stop reason: HOSPADM

## 2017-11-05 RX ORDER — FUROSEMIDE 10 MG/ML
40 INJECTION INTRAMUSCULAR; INTRAVENOUS
Status: DISPENSED | OUTPATIENT
Start: 2017-11-05 | End: 2017-11-08

## 2017-11-05 RX ADMIN — POTASSIUM CHLORIDE 20 MEQ: 1500 TABLET, EXTENDED RELEASE ORAL at 22:55

## 2017-11-05 RX ADMIN — CARVEDILOL 25 MG: 25 TABLET, FILM COATED ORAL at 22:53

## 2017-11-05 RX ADMIN — HEPARIN SODIUM 5000 UNITS: 5000 INJECTION, SOLUTION INTRAVENOUS; SUBCUTANEOUS at 22:39

## 2017-11-05 RX ADMIN — FUROSEMIDE 40 MG: 10 INJECTION, SOLUTION INTRAMUSCULAR; INTRAVENOUS at 17:01

## 2017-11-05 RX ADMIN — ZOLPIDEM TARTRATE 5 MG: 5 TABLET, FILM COATED ORAL at 22:38

## 2017-11-05 RX ADMIN — CITALOPRAM HYDROBROMIDE 20 MG: 20 TABLET ORAL at 22:38

## 2017-11-05 RX ADMIN — GABAPENTIN 600 MG: 300 CAPSULE ORAL at 22:37

## 2017-11-05 RX ADMIN — SACUBITRIL AND VALSARTAN 1 TABLET: 24; 26 TABLET, FILM COATED ORAL at 22:47

## 2017-11-05 ASSESSMENT — LIFESTYLE VARIABLES
TOTAL SCORE: 0
TOTAL SCORE: 0
ALCOHOL_USE: YES
EVER_SMOKED: YES
CONSUMPTION TOTAL: NEGATIVE
ON A TYPICAL DAY WHEN YOU DRINK ALCOHOL HOW MANY DRINKS DO YOU HAVE: 1
TOTAL SCORE: 0
HOW MANY TIMES IN THE PAST YEAR HAVE YOU HAD 5 OR MORE DRINKS IN A DAY: 0
EVER HAD A DRINK FIRST THING IN THE MORNING TO STEADY YOUR NERVES TO GET RID OF A HANGOVER: NO
EVER FELT BAD OR GUILTY ABOUT YOUR DRINKING: NO
HAVE YOU EVER FELT YOU SHOULD CUT DOWN ON YOUR DRINKING: NO
AVERAGE NUMBER OF DAYS PER WEEK YOU HAVE A DRINK CONTAINING ALCOHOL: 0
HAVE PEOPLE ANNOYED YOU BY CRITICIZING YOUR DRINKING: NO

## 2017-11-05 ASSESSMENT — ENCOUNTER SYMPTOMS
SHORTNESS OF BREATH: 1
STRIDOR: 0
CHILLS: 0
NAUSEA: 0
ABDOMINAL PAIN: 0
COUGH: 1
FALLS: 0
HEADACHES: 0
SPUTUM PRODUCTION: 0
WEAKNESS: 0
VOMITING: 0
MYALGIAS: 0
DIZZINESS: 0
TINGLING: 0
CONSTIPATION: 0
DIARRHEA: 0
DEPRESSION: 0
LOSS OF CONSCIOUSNESS: 0
FEVER: 0
PALPITATIONS: 0

## 2017-11-05 ASSESSMENT — PATIENT HEALTH QUESTIONNAIRE - PHQ9
1. LITTLE INTEREST OR PLEASURE IN DOING THINGS: NOT AT ALL
2. FEELING DOWN, DEPRESSED, IRRITABLE, OR HOPELESS: NOT AT ALL
SUM OF ALL RESPONSES TO PHQ9 QUESTIONS 1 AND 2: 0
SUM OF ALL RESPONSES TO PHQ QUESTIONS 1-9: 0

## 2017-11-05 ASSESSMENT — PAIN SCALES - GENERAL: PAINLEVEL_OUTOF10: 0

## 2017-11-05 NOTE — ED NOTES
"Chief Complaint   Patient presents with   • Shortness of Breath     SOB chronic but worse in the last couple weeks. Pt has CHF. Placed recently on ENTRESTO and taken off Losartan. Worse with exersion.      Denies any CP, Dizziness.   BP (!) 98/62   Pulse 94   Temp 36 °C (96.8 °F)   Resp 16   Ht 1.575 m (5' 2\")   Wt 74 kg (163 lb 2.3 oz)   SpO2 92%   BMI 29.84 kg/m²   Pt placed back in lobby, educated on triage process, and told to inform staff of any change in condition.  "

## 2017-11-05 NOTE — ED PROVIDER NOTES
ED Provider Note    CHIEF COMPLAINT  Chief Complaint   Patient presents with   • Shortness of Breath     SOB chronic but worse in the last couple weeks. Pt has CHF. Placed recently on ENTRESTO and taken off Losartan. Worse with exersion.        HPI  Isatu Dang is a 66 y.o. female who presents For evaluation of progressive dyspnea over the past couple weeks, severe over the past couple of days. She has a history of congestive heart failure as well as lymphoma, MI as well as lung cancer, no longer a smoker. She has not been having chest pain, she has a minimal nonproductive cough but no fever. She reports having had some abdominal pain intermittently over the past couple weeks but that has since resolved. She states her symptoms are significantly worse with any sort of exertion. No other specific complaints. No leg pain or swelling noted.    REVIEW OF SYSTEMS  Negative for fever, rash, chest pain, vomiting, diarrhea, headache, focal weakness, focal numbness, focal tingling. All other systems are negative.     PAST MEDICAL HISTORY  Past Medical History:   Diagnosis Date   • Arrhythmia    • Bowel habit changes     Diarrhea.   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   • Depression    • Diabetes (CMS-[facility])    • Dilated cardiomyopathy    • Hypercholesteremia    • Hypertension 1998    Pt states bp runs low on current meds   • Indigestion    • Joint replacement         • LBBB (left bundle branch block)    • Lung cancer (CMS-HCC) 2012        • Myocardial infarct 1998   • Neuropathy    • Obesity    • Pain 2011    Legs pain controled on meds   • Peripheral neuropathy (CMS-HCC)    • Renal cyst, acquired, left      History of benign cystic mass on the lower left kidney.   • Sleep apnea    • Unspecified hemorrhagic conditions     from aspirin    • Ventricular tachycardia (CMS-HCC)        FAMILY HISTORY  Family History   Problem Relation Age of Onset   • Diabetes Mother    • Cancer Mother    • Diabetes Sister    • Diabetes Maternal Grandmother        SOCIAL HISTORY  Social History   Substance Use Topics   • Smoking status: Former Smoker     Packs/day: 2.00     Years: 30.00     Types: Cigarettes     Quit date: 10/20/1998   • Smokeless tobacco: Never Used   • Alcohol use 0.0 - 1.2 oz/week      Comment: occasional        SURGICAL HISTORY  Past Surgical History:   Procedure Laterality Date   • AICD BATTERY CHANGE  November 2016    Generator replacement with Medtronic Viva S CRT-D [id number] implanted by Dr. Alexis. Original device implanted in 1998   • LARYNGOSCOPY  11/14/2013    Performed by Judson Willis M.D. at SURGERY SAME DAY [facility]   • BIOPSY GENERAL  11/14/2013    Performed by Judson Willis M.D. at SURGERY SAME DAY [facility]   • THORACOSCOPY ROBOTIC  8/27/2012    Performed by GANSER, JOHN H at SURGERY [facility]   • RECOVERY  7/23/2012    Performed by SURGERY, IR-RECOVERY at SURGERY SAME DAY [facility]   • BONE MARROW ASPIRATION  6/22/2012    Performed by ISAIAS EVERETT at ENDOSCOPY [facility]   • BONE MARROW BIOPSY, NDL/TROCAR  6/22/2012    Performed by ISAIAS EVERETT at ENDOSCOPY [facility]   • NECK MASS EXCISION  5/24/2012    Performed by JUDSON WILLIS at SURGERY SAME DAY [facility]S   • RECOVERY  10/21/2011    Performed by SURGERY, CATH-RECOVERY at SURGERY SAME DAY [facility]   • CARPAL TUNNEL RELEASE  4/3/08    Performed by DEEP GREEN at SURGERY [facility]   • LIPOMA EXCISION  4/3/08    Performed by DEEP GREEN at [facility]   • CARPAL TUNNEL RELEASE Right 05/07        • HYSTERECTOMY, TOTAL ABDOMINAL  1995   • HYSTERECTOMY, TOTAL ABDOMINAL          • IMPLANTABLE CARDIOVERTER DEFIBRILLATOR (ICD)  4/1/98, 03/2007, 10/2011   • TONSILLECTOMY AND ADENOIDECTOMY              CURRENT  "MEDICATIONS  I personally reviewed the medication list in the charting documentation.     ALLERGIES  Allergies   Allergen Reactions   • Ace Inhibitors      cough   • Tape Contact Dermatitis       MEDICAL RECORD  I have reviewed patient's medical record and pertinent results are listed above.      PHYSICAL EXAM  VITAL SIGNS: BP (!) 98/62   Pulse 94   Temp 36 °C (96.8 °F)   Resp 16   Ht 1.575 m (5' 2\")   Wt 74 kg (163 lb 2.3 oz)   SpO2 92%   BMI 29.84 kg/m²    Constitutional:Moderately ill-appearing with increased work of breathing noted  HENT: Mucus membranes moist.    Eyes: No scleral icterus. Normal conjunctiva   Neck: Supple, comfortable, nonpainful range of motion.   Cardiovascular: Regular heart rate and rhythm.   Thorax & Lungs: Good air move the bilaterally with coarse crackles up to the mid lung fields bilaterally, no wheezing  Abdomen: Soft, with no tenderness, rebound nor guarding.  No mass or pulsatile mass appreciated.  Skin: Warm, dry. No rash appreciated  Extremities/Musculoskeletal: No sign of trauma. No asymmetric calf tenderness, erythema or edema. Normal range of motion   Neurologic: Alert & oriented. No focal deficits observed.   Psychiatric: Normal affect appropriate for the clinical situation.    DIAGNOSTIC STUDIES / PROCEDURES    EKG  12 Lead EKG interpreted by me to show:    Paced rhythm with no change from prior         LABS  Results for orders placed or performed during the hospital encounter of 11/05/17   CBC w/ Differential   Result Value Ref Range    WBC 8.7 4.8 - 10.8 K/uL    RBC 5.06 4.20 - 5.40 M/uL    Hemoglobin 15.8 12.0 - 16.0 g/dL    Hematocrit 45.9 37.0 - 47.0 %    MCV 90.7 81.4 - 97.8 fL    MCH 31.2 27.0 - 33.0 pg    MCHC 34.4 33.6 - 35.0 g/dL    RDW 48.2 35.9 - 50.0 fL    Platelet Count 237 164 - 446 K/uL    MPV 10.9 9.0 - 12.9 fL    Neutrophils-Polys 75.30 (H) 44.00 - 72.00 %    Lymphocytes 13.70 (L) 22.00 - 41.00 %    Monocytes 6.50 0.00 - 13.40 %    Eosinophils 3.60 " 0.00 - 6.90 %    Basophils 0.60 0.00 - 1.80 %    Immature Granulocytes 0.30 0.00 - 0.90 %    Nucleated RBC 0.00 /100 WBC    Neutrophils (Absolute) 6.57 2.00 - 7.15 K/uL    Lymphs (Absolute) 1.20 1.00 - 4.80 K/uL    Monos (Absolute) 0.57 0.00 - 0.85 K/uL    Eos (Absolute) 0.31 0.00 - 0.51 K/uL    Baso (Absolute) 0.05 0.00 - 0.12 K/uL    Immature Granulocytes (abs) 0.03 0.00 - 0.11 K/uL    NRBC (Absolute) 0.00 K/uL   Complete Metabolic Panel (CMP)   Result Value Ref Range    Sodium 135 135 - 145 mmol/L    Potassium 4.6 3.6 - 5.5 mmol/L    Chloride 101 96 - 112 mmol/L    Co2 27 20 - 33 mmol/L    Anion Gap 7.0 0.0 - 11.9    Glucose 101 (H) 65 - 99 mg/dL    Bun 16 8 - 22 mg/dL    Creatinine 0.90 0.50 - 1.40 mg/dL    Calcium 10.0 8.5 - 10.5 mg/dL    AST(SGOT) 33 12 - 45 U/L    ALT(SGPT) 21 2 - 50 U/L    Alkaline Phosphatase 56 30 - 99 U/L    Total Bilirubin 0.7 0.1 - 1.5 mg/dL    Albumin 4.1 3.2 - 4.9 g/dL    Total Protein 7.0 6.0 - 8.2 g/dL    Globulin 2.9 1.9 - 3.5 g/dL    A-G Ratio 1.4 g/dL   Btype Natriuretic Peptide   Result Value Ref Range    B Natriuretic Peptide 1604 (H) 0 - 100 pg/mL   Troponin STAT   Result Value Ref Range    Troponin I <0.01 0.00 - 0.04 ng/mL   PT/INR   Result Value Ref Range    PT 14.3 12.0 - 14.6 sec    INR 1.14 (H) 0.87 - 1.13   APTT   Result Value Ref Range    APTT 30.3 24.7 - 36.0 sec   ESTIMATED GFR   Result Value Ref Range    GFR If African American >60 >60 mL/min/1.73 m 2    GFR If Non African American >60 >60 mL/min/1.73 m 2   EKG (NOW)   Result Value Ref Range    Report       [facility] Emergency Dept.    Test Date:  2017  Pt Name:    MARCI SORIA                  Department: ER  MRN:        [id number]                      Room:        18  Gender:     F                            Technician: [id number]  :        [id number]                   Requested By:ER TRIAGE PROTOCOL  Order #:    [id number]                    Reading MD:    Measurements  Intervals                                 Axis  Rate:       87                           P:          53  NY:         171                          QRS:        -86  QRSD:       138                          T:          90  QT:         420  QTc:        506    Interpretive Statements  ATRIAL-SENSED VENTRICULAR-PACED COMPLEXES  NO FURTHER ANALYSIS ATTEMPTED DUE TO PACED RHYTHM  BASELINE WANDER IN LEAD(S) II,V2,V5  Compared to ECG 11/09/2016 11:30:05  AV dual-paced complex(es) or rhythm no longer present           RADIOLOGY  DX-CHEST-PORTABLE (1 VIEW)   Final Result      1.  Prominent cardiomegaly with mild interstitial edema.      2.  Bibasilar atelectasis and small bilateral pleural effusions.            COURSE & MEDICAL DECISION MAKING  I have reviewed any medical record information, laboratory studies and radiographic results as noted above.  Differential diagnoses includes: CHF, ACS, pneumothorax, pneumonia, anemia, electro-lyte abnormalities    Encounter Summary: This is a 66 y.o. female with progressive shortness of breath or the past couple weeks, most severe the past couple of days, uses home oxygen at night but has been using it during the day. On exam he has some evidence of respiratory distress with increased respiratory effort, she has crackles in her lungs but no wheezing. Will obtain an x-ray, blood work including a troponin and BNP, will then reevaluate afterwards. On supplemental nasal cannula oxygen her oxygen saturation are adequate ------ x-ray reveals interstitial edema as well as an elevated BNP, treated with Lasix here in the emergency department, admitted at Hospital further evaluation.      DISPOSITION: Admit to the hospital in guarded condition      FINAL IMPRESSION  1. Acute on chronic congestive heart failure, unspecified congestive heart failure type (CMS-HCC)           This dictation was created using voice recognition software. The accuracy of the dictation is limited to the abilities of the software. I expect  there may be some errors of grammar and possibly content. The nursing notes were reviewed and certain aspects of this information were incorporated into this note.    Electronically signed by: Iftikhar Silva, 11/5/2017 3:14 PM

## 2017-11-05 NOTE — ED NOTES
Patient states acute on chronic SOB. Patient states multiple weeks of SOB, but last 2-3 days increased markedly. Patient states she has had increased oxygen demands at home, stating she typically only wears CPAP or 2L NC O2 to sleep, but lately has used during the day. Patient also states exertional dyspnea. Patient denies chest pain. Only other complaint is fatigue.

## 2017-11-06 LAB
ANION GAP SERPL CALC-SCNC: 5 MMOL/L (ref 0–11.9)
BUN SERPL-MCNC: 16 MG/DL (ref 8–22)
CALCIUM SERPL-MCNC: 9.8 MG/DL (ref 8.5–10.5)
CHLORIDE SERPL-SCNC: 103 MMOL/L (ref 96–112)
CO2 SERPL-SCNC: 30 MMOL/L (ref 20–33)
CREAT SERPL-MCNC: 0.73 MG/DL (ref 0.5–1.4)
ERYTHROCYTE [DISTWIDTH] IN BLOOD BY AUTOMATED COUNT: 46.5 FL (ref 35.9–50)
GFR SERPL CREATININE-BSD FRML MDRD: >60 ML/MIN/1.73 M 2
GLUCOSE BLD-MCNC: 101 MG/DL (ref 65–99)
GLUCOSE BLD-MCNC: 113 MG/DL (ref 65–99)
GLUCOSE BLD-MCNC: 114 MG/DL (ref 65–99)
GLUCOSE SERPL-MCNC: 121 MG/DL (ref 65–99)
HCT VFR BLD AUTO: 42.9 % (ref 37–47)
HGB BLD-MCNC: 14.5 G/DL (ref 12–16)
MAGNESIUM SERPL-MCNC: 1.8 MG/DL (ref 1.5–2.5)
MCH RBC QN AUTO: 30.2 PG (ref 27–33)
MCHC RBC AUTO-ENTMCNC: 33.8 G/DL (ref 33.6–35)
MCV RBC AUTO: 89.4 FL (ref 81.4–97.8)
PLATELET # BLD AUTO: 175 K/UL (ref 164–446)
PMV BLD AUTO: 10.8 FL (ref 9–12.9)
POTASSIUM SERPL-SCNC: 4.1 MMOL/L (ref 3.6–5.5)
RBC # BLD AUTO: 4.8 M/UL (ref 4.2–5.4)
SODIUM SERPL-SCNC: 138 MMOL/L (ref 135–145)
TROPONIN I SERPL-MCNC: 0.02 NG/ML (ref 0–0.04)
WBC # BLD AUTO: 7.2 K/UL (ref 4.8–10.8)

## 2017-11-06 PROCEDURE — 84484 ASSAY OF TROPONIN QUANT: CPT

## 2017-11-06 PROCEDURE — 770020 HCHG ROOM/CARE - TELE (206)

## 2017-11-06 PROCEDURE — 80048 BASIC METABOLIC PNL TOTAL CA: CPT

## 2017-11-06 PROCEDURE — 85027 COMPLETE CBC AUTOMATED: CPT

## 2017-11-06 PROCEDURE — A9270 NON-COVERED ITEM OR SERVICE: HCPCS | Performed by: HOSPITALIST

## 2017-11-06 PROCEDURE — 82962 GLUCOSE BLOOD TEST: CPT | Mod: 91

## 2017-11-06 PROCEDURE — A9270 NON-COVERED ITEM OR SERVICE: HCPCS

## 2017-11-06 PROCEDURE — 36415 COLL VENOUS BLD VENIPUNCTURE: CPT

## 2017-11-06 PROCEDURE — 700111 HCHG RX REV CODE 636 W/ 250 OVERRIDE (IP): Performed by: INTERNAL MEDICINE

## 2017-11-06 PROCEDURE — 700102 HCHG RX REV CODE 250 W/ 637 OVERRIDE(OP): Performed by: INTERNAL MEDICINE

## 2017-11-06 PROCEDURE — A9270 NON-COVERED ITEM OR SERVICE: HCPCS | Performed by: INTERNAL MEDICINE

## 2017-11-06 PROCEDURE — 99233 SBSQ HOSP IP/OBS HIGH 50: CPT | Performed by: HOSPITALIST

## 2017-11-06 PROCEDURE — 83735 ASSAY OF MAGNESIUM: CPT

## 2017-11-06 PROCEDURE — 700102 HCHG RX REV CODE 250 W/ 637 OVERRIDE(OP)

## 2017-11-06 PROCEDURE — 700102 HCHG RX REV CODE 250 W/ 637 OVERRIDE(OP): Performed by: HOSPITALIST

## 2017-11-06 RX ORDER — LOPERAMIDE HYDROCHLORIDE 2 MG/1
2 CAPSULE ORAL 4 TIMES DAILY PRN
Status: DISCONTINUED | OUTPATIENT
Start: 2017-11-06 | End: 2017-11-10 | Stop reason: HOSPADM

## 2017-11-06 RX ADMIN — ZOLPIDEM TARTRATE 5 MG: 5 TABLET, FILM COATED ORAL at 21:09

## 2017-11-06 RX ADMIN — DIGOXIN 125 MCG: 125 TABLET ORAL at 10:19

## 2017-11-06 RX ADMIN — FUROSEMIDE 40 MG: 10 INJECTION, SOLUTION INTRAMUSCULAR; INTRAVENOUS at 16:42

## 2017-11-06 RX ADMIN — HEPARIN SODIUM 5000 UNITS: 5000 INJECTION, SOLUTION INTRAVENOUS; SUBCUTANEOUS at 16:41

## 2017-11-06 RX ADMIN — POTASSIUM CHLORIDE 20 MEQ: 1500 TABLET, EXTENDED RELEASE ORAL at 10:19

## 2017-11-06 RX ADMIN — GABAPENTIN 600 MG: 300 CAPSULE ORAL at 21:09

## 2017-11-06 RX ADMIN — HEPARIN SODIUM 5000 UNITS: 5000 INJECTION, SOLUTION INTRAVENOUS; SUBCUTANEOUS at 21:09

## 2017-11-06 RX ADMIN — CARVEDILOL 25 MG: 25 TABLET, FILM COATED ORAL at 10:20

## 2017-11-06 RX ADMIN — HEPARIN SODIUM 5000 UNITS: 5000 INJECTION, SOLUTION INTRAVENOUS; SUBCUTANEOUS at 06:34

## 2017-11-06 RX ADMIN — FUROSEMIDE 40 MG: 10 INJECTION, SOLUTION INTRAMUSCULAR; INTRAVENOUS at 10:18

## 2017-11-06 RX ADMIN — POTASSIUM CHLORIDE 20 MEQ: 1500 TABLET, EXTENDED RELEASE ORAL at 21:00

## 2017-11-06 RX ADMIN — SACUBITRIL AND VALSARTAN 1 TABLET: 24; 26 TABLET, FILM COATED ORAL at 21:09

## 2017-11-06 RX ADMIN — GABAPENTIN 600 MG: 300 CAPSULE ORAL at 10:19

## 2017-11-06 RX ADMIN — ALLOPURINOL 300 MG: 300 TABLET ORAL at 10:19

## 2017-11-06 RX ADMIN — LOPERAMIDE HYDROCHLORIDE 2 MG: 2 CAPSULE ORAL at 16:42

## 2017-11-06 RX ADMIN — CARVEDILOL 25 MG: 25 TABLET, FILM COATED ORAL at 16:42

## 2017-11-06 RX ADMIN — SACUBITRIL AND VALSARTAN 1 TABLET: 24; 26 TABLET, FILM COATED ORAL at 10:19

## 2017-11-06 RX ADMIN — TIOTROPIUM BROMIDE 1 CAPSULE: 18 CAPSULE ORAL; RESPIRATORY (INHALATION) at 10:21

## 2017-11-06 RX ADMIN — ASPIRIN 325 MG: 325 TABLET, COATED ORAL at 10:19

## 2017-11-06 RX ADMIN — GABAPENTIN 600 MG: 300 CAPSULE ORAL at 16:41

## 2017-11-06 RX ADMIN — ACETAMINOPHEN 650 MG: 325 TABLET, FILM COATED ORAL at 06:37

## 2017-11-06 ASSESSMENT — ENCOUNTER SYMPTOMS
DIZZINESS: 0
HEADACHES: 0
DEPRESSION: 0
ABDOMINAL PAIN: 0
NECK PAIN: 0
BLURRED VISION: 0
SHORTNESS OF BREATH: 1
VOMITING: 0
COUGH: 0
TINGLING: 0
FEVER: 0
INSOMNIA: 0
SORE THROAT: 0
BACK PAIN: 0
EYE PAIN: 0
NAUSEA: 0
CHILLS: 0
PALPITATIONS: 0

## 2017-11-06 ASSESSMENT — PAIN SCALES - GENERAL
PAINLEVEL_OUTOF10: 0
PAINLEVEL_OUTOF10: 5
PAINLEVEL_OUTOF10: 0

## 2017-11-06 ASSESSMENT — LIFESTYLE VARIABLES: DO YOU DRINK ALCOHOL: YES

## 2017-11-06 NOTE — ASSESSMENT & PLAN NOTE
Blood pressure is stable but low in the 80's to 90's  Switch lasix to torsemide today after discussion with cardiology  To  Continue oxygen, patient uses it at home as well

## 2017-11-06 NOTE — PROGRESS NOTES
Report received from Robbie AMOS. Assumed care of pt. Assessment complete. Pt A&Ox4. Pt in no apparent signs of distress. POC discussed. Call light within reach. Bed in low position. All needs are met at this time. Pt family at bedside.

## 2017-11-06 NOTE — PROGRESS NOTES
Received bed side report, assessment complete, pt updated on plan of care. Tele box on and  Verified. Bed in lowest and locked position, call light within reach. Pt A&Ox4.

## 2017-11-06 NOTE — ED NOTES
Completed med rec by interviewing patient at bedside  Verified allergies  No ABX within the last 30 days

## 2017-11-06 NOTE — PROGRESS NOTES
[facility] Hospitalist Progress Note    Date of Service: 2017    Chief Complaint  66 y.o. female admitted 2017 with acute chf flare after recent decrease in her lasix dose for hypotension.     Interval Problem Update  A little less SOB today. Still requiring o2. No cough.   Hypotensive today but no sx and tolerating meds.   Paced 100%    Consultants/Specialty  cards    Disposition  tele        Review of Systems   Constitutional: Negative for chills and fever.   HENT: Negative for sore throat.    Eyes: Negative for blurred vision and pain.   Respiratory: Positive for shortness of breath. Negative for cough.    Cardiovascular: Negative for chest pain and palpitations.   Gastrointestinal: Negative for abdominal pain, nausea and vomiting.   Genitourinary: Negative for dysuria and urgency.   Musculoskeletal: Negative for back pain and neck pain.   Skin: Negative for itching and rash.   Neurological: Negative for dizziness, tingling and headaches.   Psychiatric/Behavioral: Negative for depression. The patient does not have insomnia.    All other systems reviewed and are negative.     Physical Exam  Laboratory/Imaging   Hemodynamics  Temp (24hrs), Av.2 °C (97.1 °F), Min:36 °C (96.8 °F), Max:36.5 °C (97.7 °F)   Temperature: 36.5 °C (97.7 °F)  Pulse  Av.3  Min: 85  Max: 94 Heart Rate (Monitored): 94  Blood Pressure : (!) 92/56, NIBP: 104/64      Respiratory      Respiration: 18, Pulse Oximetry: 94 %     Work Of Breathing / Effort: Increased Work of Breathing  RUL Breath Sounds: Clear, RML Breath Sounds: Fine Crackles, RLL Breath Sounds: Fine Crackles, SHUBHAM Breath Sounds: Clear, LLL Breath Sounds: Fine Crackles    Fluids    Intake/Output Summary (Last 24 hours) at 17 0921  Last data filed at 17 0600   Gross per 24 hour   Intake              300 ml   Output              500 ml   Net             -200 ml       Nutrition  Orders Placed This Encounter   Procedures   • Diet Order     Standing Status:    Standing     Number of Occurrences:   1     Order Specific Question:   Diet:     Answer:   Diabetic [3]     Order Specific Question:   Diet:     Answer:   Cardiac [6]     Order Specific Question:   Consistency/Fluid modifications:     Answer:   1800 ml Fluid Restriction [10]     Physical Exam   Constitutional: She is oriented to person, place, and time. She appears well-developed and well-nourished. No distress.   HENT:   Right Ear: External ear normal.   Left Ear: External ear normal.   Nose: Nose normal.   Eyes: Conjunctivae are normal. Right eye exhibits no discharge. Left eye exhibits no discharge.   Neck: No JVD present.   Cardiovascular: Regular rhythm and normal heart sounds.    No murmur heard.  Pulmonary/Chest: Effort normal. No stridor. No respiratory distress. She has no wheezes. She has rales.   Abdominal: Soft. Bowel sounds are normal. She exhibits no distension. There is no tenderness.   Musculoskeletal: She exhibits edema. She exhibits no tenderness.   Neurological: She is alert and oriented to person, place, and time.   Skin: Skin is warm and dry. She is not diaphoretic. No erythema.   Psychiatric: She has a normal mood and affect. Her behavior is normal.   Nursing note and vitals reviewed.      Recent Labs      11/05/17   1508  11/06/17   0135   WBC  8.7  7.2   RBC  5.06  4.80   HEMOGLOBIN  15.8  14.5   HEMATOCRIT  45.9  42.9   MCV  90.7  89.4   MCH  31.2  30.2   MCHC  34.4  33.8   RDW  48.2  46.5   PLATELETCT  237  175   MPV  10.9  10.8     Recent Labs      11/05/17   1508  11/06/17   0135   SODIUM  135  138   POTASSIUM  4.6  4.1   CHLORIDE  101  103   CO2  27  30   GLUCOSE  101*  121*   BUN  16  16   CREATININE  0.90  0.73   CALCIUM  10.0  9.8     Recent Labs      11/05/17   1508   APTT  30.3   INR  1.14*     Recent Labs      11/05/17   1508   BNPBTYPENAT  1604*              Assessment/Plan     Acute on chronic combined systolic and diastolic CHF (congestive heart failure) (CMS-formerly [facility])   Assessment &  Plan    - After Lasix was decreased for hypotension she began to have sx  - continue 40 mg IV Lasix twice a day  - Continue Entresto, discuss with pharmacy, no contraindication to increased Lasix dose  - Echocardiogram in 6/17 showed an ejection fraction of 35% and grade 4 diastolic dysfunction- severe disease  Likely will need to tolerated hypotension to diurese adn use CHF meds. She has no sx with low bp.         Essential hypertension- (present on admission)   Assessment & Plan    - Continue valsartan and Coreg  - Adjust as needed        Presence of biventricular AICD- (present on admission)   Assessment & Plan    No arrythmias or activity noted.         LBBB (left bundle branch block)- (present on admission)   Assessment & Plan    Chronic. Now paced          Type 2 diabetes mellitus without complication (CMS-HCC)- (present on admission)   Assessment & Plan    With vascular complications.   Cont ssi and titrate prn.         Type 2 diabetes mellitus with diabetic polyneuropathy (CMS-HCC)- (present on admission)   Assessment & Plan    - Give insulin sliding scale, adjust as needed            Reviewed items::  EKG reviewed, Radiology images reviewed, Labs reviewed and Medications reviewed  Reece catheter::  No Reece  DVT prophylaxis pharmacological::  Heparin  DVT prophylaxis - mechanical:  SCDs  Ulcer Prophylaxis::  Not indicated

## 2017-11-06 NOTE — PROGRESS NOTES
Handoff report received. Assume patient care. Patient resting in bed. Patient not in pain. Patient not in distress, AAOX 4. Safety precautions in place. Call light and personal belongings within reach. Educated to call for assistance if needed.

## 2017-11-06 NOTE — H&P
Hospital Medicine History and Physical    Date of Service  11/5/2017    Chief Complaint  Chief Complaint   Patient presents with   • Shortness of Breath     SOB chronic but worse in the last couple weeks. Pt has CHF. Placed recently on ENTRESTO and taken off Losartan. Worse with exersion.        History of Presenting Illness  66 y.o. female who presented 11/5/2017 withShortness of breath. Patient states it started a couple weeks ago, shortly after her medications were adjusted by cardiology.  Patient was started on Entresto and had her Lasix, from twice a day to daily. Patient states shortly thereafter she began noticing some shortness of breath. Patient also complained of fatigue. Patient states she is only able to walk from one room in her house to another before she has to rest. Patient states she is unable to lay flat while sleeping, uses 2 pillows. Patient does complain of a slight nonproductive cough.   Primary Care Physician  Edwin Urias M.D.    Consultants  None    Code Status  Full    Review of Systems  Review of Systems   Constitutional: Positive for malaise/fatigue. Negative for chills and fever.   HENT: Negative for congestion.    Respiratory: Positive for cough and shortness of breath. Negative for sputum production and stridor.    Cardiovascular: Negative for chest pain, palpitations and leg swelling.   Gastrointestinal: Negative for abdominal pain, constipation, diarrhea, nausea and vomiting.   Genitourinary: Negative for dysuria and urgency.   Musculoskeletal: Negative for falls and myalgias.   Neurological: Negative for dizziness, tingling, loss of consciousness, weakness and headaches.   Psychiatric/Behavioral: Negative for depression and suicidal ideas.   All other systems reviewed and are negative.       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   • CATARACT     Beginning stages no surgery yet   • Congestive heart failure (CHF) (CMS-HCC)    • Dental disorder     Upper/Lower dentures.   • Depression    • Diabetes (CMS-HCC)    • Dilated cardiomyopathy    • Hypercholesteremia    • Indigestion    • Joint replacement         • LBBB (left bundle branch block)    • Neuropathy    • Obesity    • Peripheral neuropathy (CMS-HCC)    • Renal cyst, acquired, left      History of benign cystic mass on the lower left kidney.   • Sleep apnea    • Unspecified hemorrhagic conditions     from aspirin   • Ventricular tachycardia (CMS-HCC)        Surgical History  Past Surgical History:   Procedure Laterality Date   • AICD BATTERY CHANGE  November 2016    Generator replacement with [employer] Viva S CRT-D [id number] implanted by Dr. Alexis. Original device implanted in 1998   • LARYNGOSCOPY  11/14/2013    Performed by Judson Willis M.D. at SURGERY SAME DAY [facility]S   • BIOPSY GENERAL  11/14/2013    Performed by Judson Willis M.D. at SURGERY SAME DAY [facility] ORS   • THORACOSCOPY ROBOTIC  8/27/2012    Performed by GANSER, JOHN H at SURGERY [facility] ORS   • RECOVERY  7/23/2012    Performed by SURGERY, IR-RECOVERY at SURGERY SAME DAY [facility] ORS   • BONE MARROW ASPIRATION  6/22/2012    Performed by ISAIAS EVERETT at ENDOSCOPY [facility] ORS   • BONE MARROW BIOPSY, NDL/TROCAR  6/22/2012    Performed by ISAIAS EVERETT at ENDOSCOPY [facility] ORS   • NECK MASS EXCISION  5/24/2012    Performed by JUDSON WILLIS at SURGERY SAME DAY [facility]S   • RECOVERY  10/21/2011    Performed by SURGERY, CATH-RECOVERY at SURGERY SAME DAY [facility] ORS   • CARPAL TUNNEL RELEASE  4/3/08    Performed by DEEP GREEN at SURGERY [facility]S   • LIPOMA EXCISION  4/3/08    Performed by  DEEP GREEN at SURGERY [facility] ORS   • CARPAL TUNNEL RELEASE Right 05/07        • HYSTERECTOMY, TOTAL ABDOMINAL  1995   • HYSTERECTOMY, TOTAL ABDOMINAL          • IMPLANTABLE CARDIOVERTER DEFIBRILLATOR (ICD)  4/1/98, 03/2007, 10/2011   • TONSILLECTOMY AND ADENOIDECTOMY              Medications  No current facility-administered medications on file prior to encounter.      Current Outpatient Prescriptions on File Prior to Encounter   Medication Sig Dispense Refill   • citalopram (CELEXA) 20 MG Tab TAKE ONE TABLET BY MOUTH ONCE DAILY 90 Tab 3   • sacubitril-valsartan (ENTRESTO) 24-26 MG Tab tablet Take 1 Tab by mouth 2 Times a Day. 180 Tab 3   • loperamide (IMODIUM) 2 MG Cap Take 2 mg by mouth 4 times a day as needed for Diarrhea.     • zolpidem (AMBIEN) 10 MG Tab TAKE ONE TABLET BY MOUTH AT BEDTIME, as needed prn insomnia 90 Tab 1   • albuterol (PROAIR HFA) 108 (90 BASE) MCG/ACT Aero Soln inhalation aerosol Inhale 2 Puffs by mouth every 6 hours as needed for Shortness of Breath. 3 Inhaler 3   • carvedilol (COREG) 25 MG Tab Take 1 Tab by mouth 2 times a day, with meals. 180 Tab 3   • digoxin (LANOXIN) 125 MCG Tab Take 1 Tab by mouth every day. 90 Tab 3   • furosemide (LASIX) 40 MG Tab Take 1 Tab by mouth 2 Times a Day. (Patient taking differently: Take 40 mg by mouth every day.) 180 Tab 3   • potassium chloride SA (K-DUR) 10 MEQ Tab CR Take 1 Tab by mouth every day. 90 Tab 3   • Tiotropium Bromide Monohydrate (SPIRIVA RESPIMAT) 2.5 MCG/ACT Aero Soln Inhale 2 Inhalation by mouth every day. Assemble and prime. 3 Inhaler 3   • Cyanocobalamin (VITAMIN B-12 SL) Place  under tongue every day.     • metformin (GLUCOPHAGE) 1000 MG tablet Take 1,000 mg by mouth 2 times a day, with meals.     • aspirin (ASA) 325 MG TABS Take 325 mg by mouth every day.     • Multiple Vitamin (MULTI-VITAMINS) TABS Take 1 Tab by mouth every day.     • Omega-3 Fatty Acids (FISH OIL) 300 MG CAPS Take 1 Tab by mouth every day.     • Calcium  Carbonate-Vitamin D (CALCIUM 600+D) 600-200 MG-UNIT TABS Take 2 Tabs by mouth every day.     • allopurinol (ZYLOPRIM) 300 MG TABS Take 300 mg by mouth every day.     • gabapentin (NEURONTIN) 300 MG CAPS Take 600 mg by mouth 3 times a day.         Family History  Family History   Problem Relation Age of Onset   • Diabetes Mother    • Cancer Mother    • Diabetes Sister    • Diabetes Maternal Grandmother        Social History  Social History   Substance Use Topics   • Smoking status: Former Smoker     Packs/day: 2.00     Years: 30.00     Types: Cigarettes     Quit date: 10/20/1998   • Smokeless tobacco: Never Used   • Alcohol use 0.0 - 1.2 oz/week      Comment: occasional        Allergies  Allergies   Allergen Reactions   • Ace Inhibitors      cough   • Tape Contact Dermatitis        Physical Exam  Laboratory   Hemodynamics  Temp (24hrs), Av °C (96.8 °F), Min:36 °C (96.8 °F), Max:36 °C (96.8 °F)   Temperature: 36 °C (96.8 °F)  Pulse  Av.6  Min: 85  Max: 94 Heart Rate (Monitored): 94  Blood Pressure : (!) 98/62, NIBP: 102/60      Respiratory      Respiration: 16, Pulse Oximetry: 94 %     Work Of Breathing / Effort: Increased Work of Breathing;Moderate  RUL Breath Sounds: Clear, RML Breath Sounds: Fine Crackles, RLL Breath Sounds: Fine Crackles, SHUBHAM Breath Sounds: Clear, LLL Breath Sounds: Fine Crackles    Physical Exam   Constitutional: She is oriented to person, place, and time. She appears well-developed. No distress.   HENT:   Head: Normocephalic and atraumatic.   Mouth/Throat: Oropharynx is clear and moist. No oropharyngeal exudate.   Eyes: Right eye exhibits no discharge. Left eye exhibits no discharge.   Neck: Neck supple. No tracheal deviation present.   Cardiovascular: Normal rate and regular rhythm.  Exam reveals no gallop and no friction rub.    No murmur heard.  Pulmonary/Chest: Effort normal. No stridor. No respiratory distress. She has no wheezes. She has rales. She exhibits no tenderness.    Abdominal: Soft. Bowel sounds are normal. She exhibits no distension. There is no tenderness.   Musculoskeletal: Normal range of motion. She exhibits no edema or tenderness.   Lymphadenopathy:     She has no cervical adenopathy.   Neurological: She is alert and oriented to person, place, and time. No cranial nerve deficit.   Skin: Skin is warm and dry. No rash noted. She is not diaphoretic. No erythema.   Psychiatric: She has a normal mood and affect. Her behavior is normal. Judgment and thought content normal.   Nursing note and vitals reviewed.      Recent Labs      11/05/17   1508   WBC  8.7   RBC  5.06   HEMOGLOBIN  15.8   HEMATOCRIT  45.9   MCV  90.7   MCH  31.2   MCHC  34.4   RDW  48.2   PLATELETCT  237   MPV  10.9     Recent Labs      11/05/17   1508   SODIUM  135   POTASSIUM  4.6   CHLORIDE  101   CO2  27   GLUCOSE  101*   BUN  16   CREATININE  0.90   CALCIUM  10.0     Recent Labs      11/05/17   1508   ALTSGPT  21   ASTSGOT  33   ALKPHOSPHAT  56   TBILIRUBIN  0.7   GLUCOSE  101*     Recent Labs      11/05/17   1508   APTT  30.3   INR  1.14*     Recent Labs      11/05/17   1508   BNPBTYPENAT  1604*         Lab Results   Component Value Date    TROPONINI <0.01 11/05/2017     Urinalysis:    Lab Results  Component Value Date/Time   SPECGRAVITY 1.020 06/14/2011 0800   GLUCOSEUR Negative 06/14/2011 0800   KETONES Negative 06/14/2011 0800   NITRITE Negative 06/14/2011 0800   WBCURINE 5-10 (A) 06/14/2011 0800   RBCURINE 0-2 06/14/2011 0800   BACTERIA Few (A) 06/14/2011 0800   EPITHELCELL Few 06/14/2011 0800        Imaging  Chest x-ray-1.  Prominent cardiomegaly with mild interstitial edema.    2.  Bibasilar atelectasis and small bilateral pleural effusions.   Assessment/Plan     I anticipate this patient will require at least two midnights for appropriate medical management, necessitating inpatient admission.    Acute on chronic combined systolic and diastolic CHF (congestive heart failure) (CMS-[facility])   Assessment  & Plan    - After Lasix was decreased per patient  - Start 40 mg IV Lasix twice a day  - Causing significant shortness of breath  - Causing hypoxia as well as fluid overload noted on chest x-ray  - Continue Entresto, discuss with pharmacy, no contraindication to increased Lasix dose  - Echocardiogram in 6/17 showed an ejection fraction of 35% and grade 4 diastolic dysfunction        Essential hypertension- (present on admission)   Assessment & Plan    - Continue valsartan and Coreg, start labetalol when necessary  - Adjust as needed        Type 2 diabetes mellitus with diabetic polyneuropathy (CMS-HCC)- (present on admission)   Assessment & Plan    - Give insulin sliding scale, adjust as needed            VTE prophylaxis:Heparin .

## 2017-11-06 NOTE — DISCHARGE PLANNING
Care Transition Team Assessment    IHD met with patient bedside. Patient stated she lives with her  and he drives her for appointments. She does not use any DME or HHC but does have O2 through [employer]. She does not think she will need any new services after discharge.     Information Source  Orientation : Oriented x 4  Information Given By: Patient  Informant's Name: Isatu  Who is responsible for making decisions for patient? : Patient    Readmission Evaluation  Is this a readmission?: No    Elopement Risk  Legal Hold: No  Ambulatory or Self Mobile in Wheelchair: Yes  Disoriented: No  Psychiatric Symptoms: None  History of Wandering: No  Elopement this Admit: No  Vocalizing Wanting to Leave: No  Displays Behaviors, Body Language Wanting to Leave: No-Not at Risk for Elopement  Elopement Risk: Not at Risk for Elopement    Interdisciplinary Discharge Planning  Does Admitting Nurse Feel This Could be a Complex Discharge?: No  Primary Care Physician: Dr. Edwin Urias  Lives with - Patient's Self Care Capacity: Spouse, Other (Comments) (brother and sister in law)  Patient or legal guardian wants to designate a caregiver (see row info): No  Support Systems: Spouse / Significant Other, Children  Housing / Facility: [address] [address] [place] [facility]""  Name of Care Facility: n/a  Do You Take your Prescribed Medications Regularly: Yes  Able to Return to Previous ADL's: Yes  Mobility Issues: No  Prior Services: None  Patient Expects to be Discharged to:: home  Assistance Needed: No  Durable Medical Equipment: Home Oxygen, Other - Specify (cpap)  DME Provider / Phone: [employer]    Discharge Preparedness  What is your plan after discharge?: Home with help  What are your discharge supports?: Spouse, Sibling  Prior Functional Level: Ambulatory, Independent with Activities of Daily Living, Independent with Medication Management  Difficulity with ADLs: None  Difficulity with IADLs: Driving  Difficulity with IADL Comments:   drives    Functional Assesment  Prior Functional Level: Ambulatory, Independent with Activities of Daily Living, Independent with Medication Management    Finances  Financial Barriers to Discharge: No  Prescription Coverage: Yes ([employer] )    Vision / Hearing Impairment  Vision Impairment : No  Hearing Impairment : No    Values / Beliefs / Concerns  Values / Beliefs Concerns : No  Special Hospitalization Concerns: declines    Advance Directive  Advance Directive?: None    Domestic Abuse  Have you ever been the victim of abuse or violence?: No  Physical Abuse or Sexual Abuse: No  Verbal Abuse or Emotional Abuse: No  Possible Abuse Reported to:: Not Applicable    Psychological Assessment  History of Substance Abuse: None  History of Psychiatric Problems: No  Non-compliant with Treatment: No  Newly Diagnosed Illness: No    Discharge Risks or Barriers  Discharge risks or barriers?: No    Anticipated Discharge Information  Anticipated discharge disposition: Discharge needs currently unknown  Discharge Address: [address] [place] [id number]  Discharge Contact Phone Number: [phone]

## 2017-11-06 NOTE — CARE PLAN
Problem: Safety  Goal: Will remain free from falls    Intervention: Implement fall precautions  Patient educated on safety precautions, the utilization of the call light, and bed alarm. Patient verbalized understanding.       Problem: Knowledge Deficit  Goal: Knowledge of disease process/condition, treatment plan, diagnostic tests, and medications will improve    Intervention: Explain information regarding disease process/condition, treatment plan, diagnostic tests, and medications and document in education  Patient educated on plan of care, medications, pain management, and disease processes. Patient showed verbal understanding and acceptance. Will continue to answer questions regarding medication administration, care, procedures, and upcoming tests.

## 2017-11-06 NOTE — ED NOTES
ERP at bedside for re-eval, update on results, and update on plan of care. Patient states she is agreeable to admission.

## 2017-11-06 NOTE — PROGRESS NOTES
Pt BP in the 90s and was in the 80s over night. Dr. Mora notified and instructed to go ahead with Lasix and KOSTA.

## 2017-11-06 NOTE — RESPIRATORY CARE
COPD EDUCATION by COPD CLINICAL EDUCATOR  11/6/2017 at 6:24 AM by Adina Danielle     Patient reviewed by COPD education team. Patient does not qualify for COPD program.

## 2017-11-06 NOTE — HEART FAILURE PROGRAM
"Cardiovascular Nurse Navigator () Progress Note:     Please note this is an acute HF patient. As of 11/6 diuresis is ongoing, Cardiology not following inpatient.   [facility] Plan Notes:   none    Therapy Notes:   none    Insurance Coverage:  Medicare    Patient Residence:  [place]    Follow up appointment:   Pt must have an appointment scheduled within 7 days of discharge (Cardiology, PCP, or DC Clinic).      This EMMA has placed an order for Hospital Schedulers to arrange f/u appointment within seven calendar days of anticipated discharge date: 11/8. Get into HF Program eventually if not for seven day appt.     Education:  Bedside nursing to please provide patient with HF packet and begin teaching and documenting in education tab, each shift should teach sections until all are covered.     When completing the after visit summary (discharge instructions) please select \"Cardiac Diagnosis, and Heart Failure\" in the special instructions section to populate the heart failure specific discharge instructions.     Referrals Placed:    Social Work     Assess for any social determinants that preclude patient's ability to:    Take medications daily (able to pick them up and afford them)     Weigh daily (safe living space that allows keeping equipment)     Go to follow up appointments regularly (getting there and paying co-pays)     Eat low sodium foods (access to markets that provide fresh foods, equipment in the home to prepare fresh foods ie: something more than a microwave/hot plate)    Call physician for worsening signs and symptoms (has a phone with minutes) to call physician when symptoms worsen     Thank you and please call [facility] [place] with any questions: [phone].   "

## 2017-11-07 ENCOUNTER — APPOINTMENT (OUTPATIENT)
Dept: RADIOLOGY | Facility: MEDICAL CENTER | Age: 67
DRG: 291 | End: 2017-11-07
Attending: HOSPITALIST
Payer: MEDICARE

## 2017-11-07 LAB
ALBUMIN SERPL BCP-MCNC: 3.5 G/DL (ref 3.2–4.9)
ALBUMIN/GLOB SERPL: 1.3 G/DL
ALP SERPL-CCNC: 42 U/L (ref 30–99)
ALT SERPL-CCNC: 12 U/L (ref 2–50)
ANION GAP SERPL CALC-SCNC: 8 MMOL/L (ref 0–11.9)
AST SERPL-CCNC: 22 U/L (ref 12–45)
BASOPHILS # BLD AUTO: 1 % (ref 0–1.8)
BASOPHILS # BLD: 0.06 K/UL (ref 0–0.12)
BILIRUB SERPL-MCNC: 0.8 MG/DL (ref 0.1–1.5)
BNP SERPL-MCNC: 1115 PG/ML (ref 0–100)
BUN SERPL-MCNC: 20 MG/DL (ref 8–22)
CALCIUM SERPL-MCNC: 9.4 MG/DL (ref 8.5–10.5)
CHLORIDE SERPL-SCNC: 103 MMOL/L (ref 96–112)
CO2 SERPL-SCNC: 28 MMOL/L (ref 20–33)
CREAT SERPL-MCNC: 0.74 MG/DL (ref 0.5–1.4)
EOSINOPHIL # BLD AUTO: 0.29 K/UL (ref 0–0.51)
EOSINOPHIL NFR BLD: 4.8 % (ref 0–6.9)
ERYTHROCYTE [DISTWIDTH] IN BLOOD BY AUTOMATED COUNT: 46.6 FL (ref 35.9–50)
GFR SERPL CREATININE-BSD FRML MDRD: >60 ML/MIN/1.73 M 2
GLOBULIN SER CALC-MCNC: 2.6 G/DL (ref 1.9–3.5)
GLUCOSE BLD-MCNC: 114 MG/DL (ref 65–99)
GLUCOSE BLD-MCNC: 122 MG/DL (ref 65–99)
GLUCOSE BLD-MCNC: 142 MG/DL (ref 65–99)
GLUCOSE BLD-MCNC: 98 MG/DL (ref 65–99)
GLUCOSE SERPL-MCNC: 102 MG/DL (ref 65–99)
HCT VFR BLD AUTO: 42.9 % (ref 37–47)
HGB BLD-MCNC: 14.3 G/DL (ref 12–16)
IMM GRANULOCYTES # BLD AUTO: 0.02 K/UL (ref 0–0.11)
IMM GRANULOCYTES NFR BLD AUTO: 0.3 % (ref 0–0.9)
LYMPHOCYTES # BLD AUTO: 1.35 K/UL (ref 1–4.8)
LYMPHOCYTES NFR BLD: 22.4 % (ref 22–41)
MCH RBC QN AUTO: 30 PG (ref 27–33)
MCHC RBC AUTO-ENTMCNC: 33.3 G/DL (ref 33.6–35)
MCV RBC AUTO: 90.1 FL (ref 81.4–97.8)
MONOCYTES # BLD AUTO: 0.45 K/UL (ref 0–0.85)
MONOCYTES NFR BLD AUTO: 7.5 % (ref 0–13.4)
NEUTROPHILS # BLD AUTO: 3.86 K/UL (ref 2–7.15)
NEUTROPHILS NFR BLD: 64 % (ref 44–72)
NRBC # BLD AUTO: 0 K/UL
NRBC BLD AUTO-RTO: 0 /100 WBC
PLATELET # BLD AUTO: 188 K/UL (ref 164–446)
PMV BLD AUTO: 10.9 FL (ref 9–12.9)
POTASSIUM SERPL-SCNC: 4.3 MMOL/L (ref 3.6–5.5)
PROT SERPL-MCNC: 6.1 G/DL (ref 6–8.2)
RBC # BLD AUTO: 4.76 M/UL (ref 4.2–5.4)
SODIUM SERPL-SCNC: 139 MMOL/L (ref 135–145)
WBC # BLD AUTO: 6 K/UL (ref 4.8–10.8)

## 2017-11-07 PROCEDURE — A9270 NON-COVERED ITEM OR SERVICE: HCPCS | Performed by: INTERNAL MEDICINE

## 2017-11-07 PROCEDURE — 770020 HCHG ROOM/CARE - TELE (206)

## 2017-11-07 PROCEDURE — 85025 COMPLETE CBC W/AUTO DIFF WBC: CPT

## 2017-11-07 PROCEDURE — 80053 COMPREHEN METABOLIC PANEL: CPT

## 2017-11-07 PROCEDURE — 99232 SBSQ HOSP IP/OBS MODERATE 35: CPT | Performed by: INTERNAL MEDICINE

## 2017-11-07 PROCEDURE — 83880 ASSAY OF NATRIURETIC PEPTIDE: CPT

## 2017-11-07 PROCEDURE — 36415 COLL VENOUS BLD VENIPUNCTURE: CPT

## 2017-11-07 PROCEDURE — 700111 HCHG RX REV CODE 636 W/ 250 OVERRIDE (IP): Performed by: INTERNAL MEDICINE

## 2017-11-07 PROCEDURE — 82962 GLUCOSE BLOOD TEST: CPT | Mod: 91

## 2017-11-07 PROCEDURE — 700102 HCHG RX REV CODE 250 W/ 637 OVERRIDE(OP): Performed by: INTERNAL MEDICINE

## 2017-11-07 PROCEDURE — 71010 DX-CHEST-PORTABLE (1 VIEW): CPT

## 2017-11-07 RX ORDER — FUROSEMIDE 40 MG/1
40 TABLET ORAL
Status: DISCONTINUED | OUTPATIENT
Start: 2017-11-08 | End: 2017-11-08

## 2017-11-07 RX ORDER — POTASSIUM CHLORIDE 20 MEQ/1
20 TABLET, EXTENDED RELEASE ORAL DAILY
Status: DISCONTINUED | OUTPATIENT
Start: 2017-11-08 | End: 2017-11-09

## 2017-11-07 RX ADMIN — ALLOPURINOL 300 MG: 300 TABLET ORAL at 07:57

## 2017-11-07 RX ADMIN — POTASSIUM CHLORIDE 20 MEQ: 1500 TABLET, EXTENDED RELEASE ORAL at 07:57

## 2017-11-07 RX ADMIN — CARVEDILOL 25 MG: 25 TABLET, FILM COATED ORAL at 07:57

## 2017-11-07 RX ADMIN — FUROSEMIDE 40 MG: 10 INJECTION, SOLUTION INTRAMUSCULAR; INTRAVENOUS at 15:05

## 2017-11-07 RX ADMIN — ASPIRIN 325 MG: 325 TABLET, COATED ORAL at 07:57

## 2017-11-07 RX ADMIN — TIOTROPIUM BROMIDE 1 CAPSULE: 18 CAPSULE ORAL; RESPIRATORY (INHALATION) at 08:01

## 2017-11-07 RX ADMIN — HEPARIN SODIUM 5000 UNITS: 5000 INJECTION, SOLUTION INTRAVENOUS; SUBCUTANEOUS at 15:05

## 2017-11-07 RX ADMIN — GABAPENTIN 600 MG: 300 CAPSULE ORAL at 07:57

## 2017-11-07 RX ADMIN — HEPARIN SODIUM 5000 UNITS: 5000 INJECTION, SOLUTION INTRAVENOUS; SUBCUTANEOUS at 21:19

## 2017-11-07 RX ADMIN — HEPARIN SODIUM 5000 UNITS: 5000 INJECTION, SOLUTION INTRAVENOUS; SUBCUTANEOUS at 06:41

## 2017-11-07 RX ADMIN — GABAPENTIN 600 MG: 300 CAPSULE ORAL at 15:05

## 2017-11-07 RX ADMIN — DIGOXIN 125 MCG: 125 TABLET ORAL at 07:57

## 2017-11-07 RX ADMIN — INSULIN HUMAN 3 UNITS: 100 INJECTION, SOLUTION PARENTERAL at 21:26

## 2017-11-07 RX ADMIN — SACUBITRIL AND VALSARTAN 1 TABLET: 24; 26 TABLET, FILM COATED ORAL at 21:23

## 2017-11-07 RX ADMIN — ZOLPIDEM TARTRATE 5 MG: 5 TABLET, FILM COATED ORAL at 21:19

## 2017-11-07 RX ADMIN — FUROSEMIDE 40 MG: 10 INJECTION, SOLUTION INTRAMUSCULAR; INTRAVENOUS at 07:57

## 2017-11-07 RX ADMIN — GABAPENTIN 600 MG: 300 CAPSULE ORAL at 21:19

## 2017-11-07 RX ADMIN — SACUBITRIL AND VALSARTAN 1 TABLET: 24; 26 TABLET, FILM COATED ORAL at 08:00

## 2017-11-07 ASSESSMENT — ENCOUNTER SYMPTOMS
FEVER: 0
NAUSEA: 0
VOMITING: 0
DIAPHORESIS: 0
DIZZINESS: 0
WHEEZING: 0
ABDOMINAL PAIN: 0
DIARRHEA: 0
SHORTNESS OF BREATH: 1
HEADACHES: 0
COUGH: 0
SPUTUM PRODUCTION: 0
CHILLS: 0
PALPITATIONS: 0
TREMORS: 0

## 2017-11-07 ASSESSMENT — PAIN SCALES - GENERAL
PAINLEVEL_OUTOF10: 0

## 2017-11-07 NOTE — CARE PLAN
Problem: Safety  Goal: Will remain free from injury  Outcome: PROGRESSING AS EXPECTED  Call light within reach, bed in lowest locked position, hourly rounding in place. Tele box on and rhythm verified. Bed alarm on. Treaded socks on. Appropriate signs placed. Mobility assessed, pt is steady and needs no assistance.     Problem: Fluid Volume:  Goal: Will maintain balanced intake and output  Outcome: PROGRESSING AS EXPECTED  Educate pt on fluid restriction, pt is able to verbalize understanding. Monitor pts intake and output.

## 2017-11-07 NOTE — PROGRESS NOTES
Bedside report received, Pt care assumed. Tele box on. VSS. Pt assessment complete. Pt AOX4, no signs of distress noted at this time.  Pt c/o of 0/10 pain. Pt denies any additional needs at this time. Bed in lowest position, ambulates with no assistance; tolerates well. POC discussed with Pt/family, verbalizes understanding, no questions at this time. Pt educated on fall risk and verbalizes understanding, call light within reach, will continue to monitor.

## 2017-11-07 NOTE — PROGRESS NOTES
[facility] Hospitalist Progress Note    Date of Service: 2017    Chief Complaint  66 y.o. female admitted 2017 with shortness of breath and evidence of fluid overload with her lasix being decreased outpatient.    Interval Problem Update  Breathing is better today, she is requiring 2-3 liters oxygen    Consultants/Specialty  none    Disposition  home        Review of Systems   Constitutional: Negative for chills, diaphoresis and fever.   HENT: Negative for congestion.    Respiratory: Positive for shortness of breath. Negative for cough, sputum production and wheezing.         Shortness of breath is improving   Cardiovascular: Negative for chest pain and palpitations.   Gastrointestinal: Negative for abdominal pain, diarrhea, nausea and vomiting.   Skin: Negative for rash.   Neurological: Negative for dizziness, tremors and headaches.      Physical Exam  Laboratory/Imaging   Hemodynamics  Temp (24hrs), Av.3 °C (97.3 °F), Min:35.8 °C (96.5 °F), Max:36.6 °C (97.8 °F)   Temperature: 35.8 °C (96.5 °F)  Pulse  Av.1  Min: 74  Max: 101    Blood Pressure : (!) 83/50, NIBP: (!) 83/52      Respiratory      Respiration: 17, Pulse Oximetry: 92 %     Work Of Breathing / Effort: Increased Work of Breathing  RUL Breath Sounds: Clear, RML Breath Sounds: Diminished, RLL Breath Sounds: Diminished, SHUBHAM Breath Sounds: Clear, LLL Breath Sounds: Diminished    Fluids    Intake/Output Summary (Last 24 hours) at 17 1428  Last data filed at 17 1200   Gross per 24 hour   Intake              480 ml   Output             2900 ml   Net            -2420 ml       Nutrition  Orders Placed This Encounter   Procedures   • Diet Order     Standing Status:   Standing     Number of Occurrences:   1     Order Specific Question:   Diet:     Answer:   Diabetic [3]     Order Specific Question:   Diet:     Answer:   Cardiac [6]     Order Specific Question:   Consistency/Fluid modifications:     Answer:   1800 ml Fluid Restriction [10]      Physical Exam   Constitutional: No distress.   HENT:   Mouth/Throat: Oropharynx is clear and moist.   Eyes: Conjunctivae are normal. No scleral icterus.   Neck: No tracheal deviation present.   Cardiovascular: Normal rate, regular rhythm and intact distal pulses.    No murmur heard.  Pulmonary/Chest: No stridor. No respiratory distress. She has no wheezes. She has no rales.   Abdominal: Soft. Bowel sounds are normal. She exhibits no distension.   Musculoskeletal: She exhibits edema.   Neurological: She is alert.   Skin: Skin is warm and dry. No rash noted. She is not diaphoretic.   Nursing note and vitals reviewed.      Recent Labs      11/05/17   1508  11/06/17   0135  11/07/17   0233   WBC  8.7  7.2  6.0   RBC  5.06  4.80  4.76   HEMOGLOBIN  15.8  14.5  14.3   HEMATOCRIT  45.9  42.9  42.9   MCV  90.7  89.4  90.1   MCH  31.2  30.2  30.0   MCHC  34.4  33.8  33.3*   RDW  48.2  46.5  46.6   PLATELETCT  237  175  188   MPV  10.9  10.8  10.9     Recent Labs      11/05/17   1508  11/06/17   0135  11/07/17   0233   SODIUM  135  138  139   POTASSIUM  4.6  4.1  4.3   CHLORIDE  101  103  103   CO2  27  30  28   GLUCOSE  101*  121*  102*   BUN  16  16  20   CREATININE  0.90  0.73  0.74   CALCIUM  10.0  9.8  9.4     Recent Labs      11/05/17   1508   APTT  30.3   INR  1.14*     Recent Labs      11/05/17   1508  11/07/17   0233   BNPBTYPENAT  1604*  1115*              Assessment/Plan     Acute on chronic combined systolic and diastolic CHF (congestive heart failure) (CMS-[facility])   Assessment & Plan    Blood pressure is stable in the 80's systolic, will monitor  Will switch to oral lasix        Essential hypertension- (present on admission)   Assessment & Plan    Controlled, holding coreg for low blood pressure        Presence of biventricular AICD- (present on admission)   Assessment & Plan    No arrythmias on monitoring        Restrictive lung disease- (present on admission)   Assessment & Plan    No acute issues        Lung  cancer (CMS-HCC)- (present on admission)   Assessment & Plan    Outpatient follow up, historical diagnosis        LBBB (left bundle branch block)- (present on admission)   Assessment & Plan    Pacemaker in place        Type 2 diabetes mellitus without complication (CMS-HCC)- (present on admission)   Assessment & Plan    Insulin as needed        Type 2 diabetes mellitus with diabetic polyneuropathy (CMS-HCC)- (present on admission)   Assessment & Plan    Insulin with good control        Obstructive sleep apnea- (present on admission)   Assessment & Plan    cpap at night  Patient uses oxygen during the day as needed at home            Reviewed items::  Labs reviewed and Medications reviewed  Reece catheter::  No Reece  DVT prophylaxis pharmacological::  Heparin  Ulcer Prophylaxis::  Not indicated

## 2017-11-07 NOTE — PROGRESS NOTES
Bed side report received from day shift, pt aaox4, tele box on and rhythm verified, assessment complete with no signs of distress, pt denies SOB or chest pain. Bed in lowest and locked position, call light within reach, hourly rounding in place.

## 2017-11-08 PROBLEM — I50.23 NYHA CLASS 3 ACUTE ON CHRONIC SYSTOLIC HEART FAILURE (HCC): Status: ACTIVE | Noted: 2017-11-08

## 2017-11-08 PROBLEM — I50.20 ACC/AHA STAGE C SYSTOLIC HEART FAILURE (HCC): Status: ACTIVE | Noted: 2017-11-08

## 2017-11-08 LAB
ANION GAP SERPL CALC-SCNC: 6 MMOL/L (ref 0–11.9)
BNP SERPL-MCNC: 1109 PG/ML (ref 0–100)
BUN SERPL-MCNC: 16 MG/DL (ref 8–22)
CALCIUM SERPL-MCNC: 9.7 MG/DL (ref 8.5–10.5)
CHLORIDE SERPL-SCNC: 101 MMOL/L (ref 96–112)
CO2 SERPL-SCNC: 32 MMOL/L (ref 20–33)
CREAT SERPL-MCNC: 0.84 MG/DL (ref 0.5–1.4)
GFR SERPL CREATININE-BSD FRML MDRD: >60 ML/MIN/1.73 M 2
GLUCOSE BLD-MCNC: 99 MG/DL (ref 65–99)
GLUCOSE SERPL-MCNC: 100 MG/DL (ref 65–99)
LV EJECT FRACT  99904: 15
LV EJECT FRACT MOD 2C 99903: 13.83
LV EJECT FRACT MOD 4C 99902: 17.65
LV EJECT FRACT MOD BP 99901: 16.37
POTASSIUM SERPL-SCNC: 5.3 MMOL/L (ref 3.6–5.5)
SODIUM SERPL-SCNC: 139 MMOL/L (ref 135–145)

## 2017-11-08 PROCEDURE — 700111 HCHG RX REV CODE 636 W/ 250 OVERRIDE (IP): Performed by: INTERNAL MEDICINE

## 2017-11-08 PROCEDURE — A9270 NON-COVERED ITEM OR SERVICE: HCPCS | Performed by: INTERNAL MEDICINE

## 2017-11-08 PROCEDURE — 82962 GLUCOSE BLOOD TEST: CPT

## 2017-11-08 PROCEDURE — 93306 TTE W/DOPPLER COMPLETE: CPT | Mod: 26 | Performed by: INTERNAL MEDICINE

## 2017-11-08 PROCEDURE — 83880 ASSAY OF NATRIURETIC PEPTIDE: CPT

## 2017-11-08 PROCEDURE — 80048 BASIC METABOLIC PNL TOTAL CA: CPT

## 2017-11-08 PROCEDURE — 700102 HCHG RX REV CODE 250 W/ 637 OVERRIDE(OP): Performed by: INTERNAL MEDICINE

## 2017-11-08 PROCEDURE — 93306 TTE W/DOPPLER COMPLETE: CPT

## 2017-11-08 PROCEDURE — 36415 COLL VENOUS BLD VENIPUNCTURE: CPT

## 2017-11-08 PROCEDURE — 99232 SBSQ HOSP IP/OBS MODERATE 35: CPT | Performed by: INTERNAL MEDICINE

## 2017-11-08 PROCEDURE — 770020 HCHG ROOM/CARE - TELE (206)

## 2017-11-08 RX ORDER — FUROSEMIDE 10 MG/ML
40 INJECTION INTRAMUSCULAR; INTRAVENOUS
Status: DISCONTINUED | OUTPATIENT
Start: 2017-11-08 | End: 2017-11-09

## 2017-11-08 RX ORDER — SPIRONOLACTONE 25 MG/1
12.5 TABLET ORAL
Status: DISCONTINUED | OUTPATIENT
Start: 2017-11-08 | End: 2017-11-10 | Stop reason: HOSPADM

## 2017-11-08 RX ADMIN — FUROSEMIDE 20 MG: 10 INJECTION, SOLUTION INTRAMUSCULAR; INTRAVENOUS at 14:50

## 2017-11-08 RX ADMIN — HEPARIN SODIUM 5000 UNITS: 5000 INJECTION, SOLUTION INTRAVENOUS; SUBCUTANEOUS at 20:21

## 2017-11-08 RX ADMIN — HEPARIN SODIUM 5000 UNITS: 5000 INJECTION, SOLUTION INTRAVENOUS; SUBCUTANEOUS at 14:51

## 2017-11-08 RX ADMIN — ASPIRIN 325 MG: 325 TABLET, COATED ORAL at 08:56

## 2017-11-08 RX ADMIN — HEPARIN SODIUM 5000 UNITS: 5000 INJECTION, SOLUTION INTRAVENOUS; SUBCUTANEOUS at 06:00

## 2017-11-08 RX ADMIN — GABAPENTIN 600 MG: 300 CAPSULE ORAL at 14:51

## 2017-11-08 RX ADMIN — METFORMIN HYDROCHLORIDE 500 MG: 500 TABLET, FILM COATED ORAL at 17:46

## 2017-11-08 RX ADMIN — METFORMIN HYDROCHLORIDE 500 MG: 500 TABLET, FILM COATED ORAL at 08:56

## 2017-11-08 RX ADMIN — TIOTROPIUM BROMIDE 1 CAPSULE: 18 CAPSULE ORAL; RESPIRATORY (INHALATION) at 08:56

## 2017-11-08 RX ADMIN — ALLOPURINOL 300 MG: 300 TABLET ORAL at 08:56

## 2017-11-08 RX ADMIN — SACUBITRIL AND VALSARTAN 1 TABLET: 24; 26 TABLET, FILM COATED ORAL at 08:57

## 2017-11-08 RX ADMIN — GABAPENTIN 600 MG: 300 CAPSULE ORAL at 08:56

## 2017-11-08 RX ADMIN — DIGOXIN 125 MCG: 125 TABLET ORAL at 08:56

## 2017-11-08 RX ADMIN — FUROSEMIDE 40 MG: 40 TABLET ORAL at 08:56

## 2017-11-08 RX ADMIN — SPIRONOLACTONE 12.5 MG: 25 TABLET ORAL at 17:45

## 2017-11-08 RX ADMIN — POTASSIUM CHLORIDE 20 MEQ: 1500 TABLET, EXTENDED RELEASE ORAL at 08:56

## 2017-11-08 RX ADMIN — ZOLPIDEM TARTRATE 5 MG: 5 TABLET, FILM COATED ORAL at 21:52

## 2017-11-08 RX ADMIN — ACETAMINOPHEN 650 MG: 325 TABLET, FILM COATED ORAL at 14:50

## 2017-11-08 RX ADMIN — GABAPENTIN 600 MG: 300 CAPSULE ORAL at 20:21

## 2017-11-08 RX ADMIN — SACUBITRIL AND VALSARTAN 1 TABLET: 24; 26 TABLET, FILM COATED ORAL at 20:21

## 2017-11-08 ASSESSMENT — ENCOUNTER SYMPTOMS
DIARRHEA: 0
WHEEZING: 0
COUGH: 0
VOMITING: 0
DIZZINESS: 0
SPUTUM PRODUCTION: 0
NERVOUS/ANXIOUS: 1
ABDOMINAL PAIN: 0
NAUSEA: 0
DIAPHORESIS: 0
SHORTNESS OF BREATH: 1
HEADACHES: 0
FEVER: 0
TREMORS: 0

## 2017-11-08 ASSESSMENT — PAIN SCALES - GENERAL
PAINLEVEL_OUTOF10: 4
PAINLEVEL_OUTOF10: 0

## 2017-11-08 NOTE — PROGRESS NOTES
Notified Dr. Osorio about holding morning carvedilol due to  and patient is also receiving lasix and enestro.

## 2017-11-08 NOTE — CONSULTS
Cardiology Consult Note:    Devyn To  Date & Time note created:    11/8/2017   2:16 PM     Referring MD:  Dr. Osorio    Patient ID:   Name:             Isatu aDng   YOB: 1950  Age:                 66 y.o.  female   MRN:               [id number]                                                             Chief Complaint / Reason for consult:  Heart failure exacerbation.    History of Present Illness:    Mrs. Dang is a 66-year-old Caucasianfemale with history of ventricular fibrillation status post AICD implantation in 1998 (2nd prevention as she had V fib arrest at that time) with history of nonischemic cardiomyopathy whopresented to emergency room for evaluation of chest discomfort and not feeling well.  The patient was hospitalized in 1998 with congestive heart failure.  The left ventricular systolic function was markedly impaired at that time.  Cardiac catheterization, however, revealed very minimal coronary artery disease at that time.      She is now followed in our office by Dr. Felix Alexis and s/p BiV ICD.     Her last hospitalization for HF exacerbation was in 02/2016.    She is now in the hospital due to HF exacerbation again. She has been diuresed with BNP still at 1100s.    Negative 0.8 liters over the last 24 hours.  Negative 2.2 liters during hospital stay.    Review of Systems:      Constitutional: Denies fevers, Denies weight changes  Eyes: Denies changes in vision, no eye pain  Ears/Nose/Throat/Mouth: Denies nasal congestion or sore throat   Cardiovascular: no chest pain, no palpitations   Respiratory: yes shortness of breath , Denies cough  Gastrointestinal/Hepatic: Denies abdominal pain, nausea, vomiting, diarrhea, constipation or GI bleeding   Genitourinary: Denies dysuria or frequency  Musculoskeletal/Rheum: Denies  joint pain and swelling   Skin: Denies rash  Neurological: Denies headache, confusion, memory loss or focal weakness/parasthesias  Psychiatric:  denies mood disorder   Endocrine: Aline thyroid problems  Heme/Oncology/Lymph Nodes: Denies enlarged lymph nodes, denies brusing or known bleeding disorder  All other systems were reviewed and are negative (AMA/CMS criteria)                Past Medical History:   Past Medical History:   Diagnosis Date   • ACC/AHA stage C systolic heart failure (CMS-[facility]) 11/8/2017   • Arrhythmia    • Bowel habit changes     Diarrhea.   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   • Depression    • Diabetes (CMS-HCC)    • Dilated cardiomyopathy    • Hypercholesteremia    • Hypertension 1998    Pt states bp runs low on current meds   • Indigestion    • Joint replacement         • LBBB (left bundle branch block)    • Lung cancer (CMS-HCC) 2012        • Myocardial infarct 1998   • Neuropathy    • Obesity    • Pain 2011    Legs pain controled on meds   • Peripheral neuropathy (CMS-HCC)    • Renal cyst, acquired, left      History of benign cystic mass on the lower left kidney.   • Sleep apnea    • Unspecified hemorrhagic conditions     from aspirin   • Ventricular tachycardia ([facility])      Active Hospital Problems    Diagnosis   • Acute on chronic combined systolic and diastolic CHF (congestive heart failure) (CMS-HCC) [I50.43]     Priority: High   • Essential hypertension [I10]     Priority: Medium   • Presence of biventricular AICD [Z95.810]     Priority: Medium   • Type 2 diabetes mellitus with diabetic polyneuropathy (CMS-HCC) [E11.42]     Priority: Low   • Obstructive sleep apnea [G47.33]     Priority: Low   • ACC/AHA stage C systolic heart failure (CMS-HCC) [I50.20]   • NYHA class 3 acute on chronic systolic heart failure (CMS-HCC) [I50.23]   • Restrictive lung disease [J98.4]   • Lung cancer (CMS-HCC) [C34.90]   • Type 2 diabetes mellitus without  complication (CMS-HCC) [E11.9]   • LBBB (left bundle branch block) [I44.7]       Past Surgical History:  Past Surgical History:   Procedure Laterality Date   • AICD BATTERY CHANGE  November 2016    Generator replacement with Medtronic Viva S CRT-D [id number] implanted by Dr. Alxeis. Original device implanted in 1998   • LARYNGOSCOPY  11/14/2013    Performed by Judson Willis M.D. at SURGERY SAME DAY [facility]   • BIOPSY GENERAL  11/14/2013    Performed by Judson Willis M.D. at SURGERY SAME DAY [facility]   • THORACOSCOPY ROBOTIC  8/27/2012    Performed by GANSER, JOHN H at SURGERY [facility] ORS   • RECOVERY  7/23/2012    Performed by SURGERY, IR-RECOVERY at SURGERY SAME DAY [facility]   • BONE MARROW ASPIRATION  6/22/2012    Performed by ISAIAS EVERETT at ENDOSCOPY [facility]   • BONE MARROW BIOPSY, NDL/TROCAR  6/22/2012    Performed by ISAIAS EVERETT at ENDOSCOPY [facility] ORS   • NECK MASS EXCISION  5/24/2012    Performed by JUDSON WILLIS at SURGERY SAME DAY [facility] ORS   • RECOVERY  10/21/2011    Performed by SURGERY, CATH-RECOVERY at SURGERY SAME DAY [facility] ORS   • CARPAL TUNNEL RELEASE  4/3/08    Performed by DEEP GREEN at SURGERY [facility]   • LIPOMA EXCISION  4/3/08    Performed by DEEP GREEN at SURGERY [facility]   • CARPAL TUNNEL RELEASE Right 05/07        • HYSTERECTOMY, TOTAL ABDOMINAL  1995   • HYSTERECTOMY, TOTAL ABDOMINAL          • IMPLANTABLE CARDIOVERTER DEFIBRILLATOR (ICD)  4/1/98, 03/2007, 10/2011   • TONSILLECTOMY AND ADENOIDECTOMY              Hospital Medications:    Current Facility-Administered Medications:   •  metformin (GLUCOPHAGE) tablet 500 mg, 500 mg, Oral, BID WITH MEALS, Kimbre Osorio M.D., 500 mg at 11/08/17 0856  •  furosemide (LASIX) injection 40 mg, 40 mg, Intravenous, BID DIURETIC, Devyn Barajas M.D.  •  potassium chloride SA (Kdur) tablet 20 mEq, 20 mEq, Oral, DAILY, Kimber Osorio M.D., 20 mEq at 11/08/17 0856  •   loperamide (IMODIUM) capsule 2 mg, 2 mg, Oral, 4X/DAY PRN, Felix Mora M.D., 2 mg at 11/06/17 1642  •  allopurinol (ZYLOPRIM) tablet 300 mg, 300 mg, Oral, DAILY, LU Millan.O., 300 mg at 11/08/17 0856  •  aspirin (ASA) tablet 325 mg, 325 mg, Oral, DAILY, MONIQUE MillanO., 325 mg at 11/08/17 0856  •  carvedilol (COREG) tablet 25 mg, 25 mg, Oral, BID WITH MEALS, MONIQUE MillanO., Stopped at 11/07/17 1730  •  citalopram (CELEXA) tablet 20 mg, 20 mg, Oral, DAILY, MONIQUE MillanO., 20 mg at 11/05/17 2238  •  digoxin (LANOXIN) tablet 125 mcg, 125 mcg, Oral, DAILY, MONIQUE MillanO., 125 mcg at 11/08/17 0856  •  gabapentin (NEURONTIN) capsule 600 mg, 600 mg, Oral, TID, Lg Evans D.O., 600 mg at 11/08/17 0856  •  sacubitril-valsartan (ENTRESTO) 24-26 MG tablet 1 Tab, 1 Tab, Oral, BID, LU Millan.O., 1 Tab at 11/08/17 0857  •  zolpidem (AMBIEN) tablet 5 mg, 5 mg, Oral, HS PRN - MR X 1, LU Millan.O., 5 mg at 11/07/17 2119  •  senna-docusate (PERICOLACE or SENOKOT S) 8.6-50 MG per tablet 2 Tab, 2 Tab, Oral, BID **AND** polyethylene glycol/lytes (MIRALAX) PACKET 1 Packet, 1 Packet, Oral, QDAY PRN **AND** magnesium hydroxide (MILK OF MAGNESIA) suspension 30 mL, 30 mL, Oral, QDAY PRN **AND** bisacodyl (DULCOLAX) suppository 10 mg, 10 mg, Rectal, QDAY PRN, LU Millan.O.  •  heparin injection 5,000 Units, 5,000 Units, Subcutaneous, Q8HRS, MONIQUE MillanO., 5,000 Units at 11/08/17 0600  •  acetaminophen (TYLENOL) tablet 650 mg, 650 mg, Oral, Q6HRS PRN, MONIQUE MillanO., 650 mg at 11/06/17 0637  •  labetalol (NORMODYNE,TRANDATE) injection 10 mg, 10 mg, Intravenous, Q4HRS PRN, Lg Evans D.O.  •  ondansetron (ZOFRAN) syringe/vial injection 4 mg, 4 mg, Intravenous, Q4HRS PRN, Lg Evans D.O.  •  ondansetron (ZOFRAN ODT) dispertab 4 mg, 4 mg, Oral, Q4HRS PRN, MONIQUE MillanO.  •  tiotropium (SPIRIVA) 18 MCG inhalation capsule 1 Cap, 1 Cap,  Inhalation, DAILY, Marguerite Thomason, PharmD, 1 Cap at 11/08/17 0856  •  albuterol inhaler 1-2 Puff, 1-2 Puff, Inhalation, Q6HRS PRN, Marguerite Thomason, PharmD  •  glucose 4 g chewable tablet 16 g, 16 g, Oral, Q15 MIN PRN, Nicolás Montenegro, PharmD  •  dextrose 50% (D50W) injection 25 mL, 25 mL, Intravenous, Q15 MIN PRN, Nicolás Montenegro, PharmD    Current Outpatient Medications:  Prescriptions Prior to Admission   Medication Sig Dispense Refill Last Dose   • furosemide (LASIX) 40 MG Tab Take 40 mg by mouth every day.   11/5/2017 at 0830   • citalopram (CELEXA) 20 MG Tab TAKE ONE TABLET BY MOUTH ONCE DAILY 90 Tab 3 11/3/2017 at 0830   • sacubitril-valsartan (ENTRESTO) 24-26 MG Tab tablet Take 1 Tab by mouth 2 Times a Day. 180 Tab 3 11/5/2017 at 0830   • loperamide (IMODIUM) 2 MG Cap Take 2 mg by mouth 4 times a day as needed for Diarrhea.   11/1/2017 at unknown   • zolpidem (AMBIEN) 10 MG Tab TAKE ONE TABLET BY MOUTH AT BEDTIME, as needed prn insomnia 90 Tab 1 11/4/2017 at 2100   • albuterol (PROAIR HFA) 108 (90 BASE) MCG/ACT Aero Soln inhalation aerosol Inhale 2 Puffs by mouth every 6 hours as needed for Shortness of Breath. 3 Inhaler 3 11/5/2017 at 1030   • carvedilol (COREG) 25 MG Tab Take 1 Tab by mouth 2 times a day, with meals. 180 Tab 3 11/5/2017 at 0830   • digoxin (LANOXIN) 125 MCG Tab Take 1 Tab by mouth every day. 90 Tab 3 11/5/2017 at 0830   • potassium chloride SA (K-DUR) 10 MEQ Tab CR Take 1 Tab by mouth every day. 90 Tab 3 11/5/2017 at 0830   • Tiotropium Bromide Monohydrate (SPIRIVA RESPIMAT) 2.5 MCG/ACT Aero Soln Inhale 2 Inhalation by mouth every day. Assemble and prime. 3 Inhaler 3 11/5/2017 at 0830   • Cyanocobalamin (VITAMIN B-12 SL) Place 1 Tab under tongue every day.   11/5/2017 at 0830   • metformin (GLUCOPHAGE) 1000 MG tablet Take 1,000 mg by mouth 2 times a day, with meals.   11/5/2017 at 0830   • aspirin (ASA) 325 MG TABS Take 325 mg by mouth every bedtime.   11/4/2017 at 2100   • Multiple  "Vitamin (MULTI-VITAMINS) TABS Take 1 Tab by mouth every day.   11/5/2017 at 0830   • Calcium Carbonate-Vitamin D (CALCIUM 600+D) 600-200 MG-UNIT TABS Take 2 Tabs by mouth every day.   11/5/2017 at 0830   • allopurinol (ZYLOPRIM) 300 MG TABS Take 300 mg by mouth every day.   11/5/2017 at 0830   • gabapentin (NEURONTIN) 300 MG CAPS Take 600 mg by mouth 3 times a day.   11/5/2017 at 0830       Medication Allergy:  Allergies   Allergen Reactions   • Tape Contact Dermatitis   • Ace Inhibitors Cough     cough       Family History:  Family History   Problem Relation Age of Onset   • Diabetes Mother    • Cancer Mother    • Diabetes Sister    • Diabetes Maternal Grandmother        Social History:  Social History     Social History   • Marital status:      Spouse name: N/A   • Number of children: N/A   • Years of education: N/A     Occupational History   • Not on file.     Social History Main Topics   • Smoking status: Former Smoker     Packs/day: 2.00     Years: 30.00     Types: Cigarettes     Quit date: 10/20/1998   • Smokeless tobacco: Never Used   • Alcohol use 0.0 - 1.2 oz/week      Comment: occasional    • Drug use: No   • Sexual activity: Not Currently     Other Topics Concern   • Not on file     Social History Narrative   • No narrative on file         Physical Exam:  Vitals/ General Appearance:   Weight/BMI: Body mass index is 29.03 kg/m².  Blood pressure (!) 94/58, pulse 80, temperature 36.1 °C (96.9 °F), resp. rate 20, height 1.575 m (5' 2\"), weight 72 kg (158 lb 11.7 oz), last menstrual period 11/05/1994, SpO2 94 %, not currently breastfeeding.  Vitals:    11/07/17 2330 11/08/17 0315 11/08/17 0800 11/08/17 1200   BP: (!) 96/60 102/67 100/69 (!) 94/58   Pulse: 90 84 94 80   Resp: 17 17 (!) 22 20   Temp: 36.7 °C (98 °F) 36.6 °C (97.8 °F) 36.6 °C (97.8 °F) 36.1 °C (96.9 °F)   SpO2: 98% 99% 93% 94%   Weight:       Height:         Oxygen Therapy:  Pulse Oximetry: 94 %, O2 (LPM): 0, O2 Delivery: None (Room " Air)    Constitutional:   Well developed, Well nourished, No acute distress  HENMT:  Normocephalic, Atraumatic, Oropharynx moist mucous membranes, No oral exudates, Nose normal.  No thyromegaly.  Eyes:  EOMI, Conjunctiva normal, No discharge.  Neck:  Normal range of motion, No cervical tenderness,  no JVD.  Cardiovascular:  Normal heart rate, Normal rhythm, No murmurs, No rubs, No gallops.   Extremitites with intact distal pulses, no cyanosis, or edema.  Lungs:  Normal breath sounds, breath sounds clear to auscultation bilaterally,  no rales, no rhonchi, no wheezing.   Abdomen: Bowel sounds normal, Soft, No tenderness, No guarding, No rebound, No masses, No hepatosplenomegaly.  Skin: Warm, Dry, No erythema, No rash, no induration.  Neurologic: Alert & oriented x 3, No focal deficits noted, cranial nerves II through X are intact.  Psychiatric: Affect normal, Judgment normal, Mood normal.    MDM (Data Review):     Records reviewed and summarized in current documentation    Lab Data Review:  Recent Results (from the past 24 hour(s))   ACCU-CHEK GLUCOSE    Collection Time: 11/07/17  5:08 PM   Result Value Ref Range    Glucose - Accu-Ck 122 (H) 65 - 99 mg/dL   ACCU-CHEK GLUCOSE    Collection Time: 11/07/17  9:25 PM   Result Value Ref Range    Glucose - Accu-Ck 142 (H) 65 - 99 mg/dL   ACCU-CHEK GLUCOSE    Collection Time: 11/08/17  5:59 AM   Result Value Ref Range    Glucose - Accu-Ck 99 65 - 99 mg/dL   BASIC METABOLIC PANEL    Collection Time: 11/08/17  8:35 AM   Result Value Ref Range    Sodium 139 135 - 145 mmol/L    Potassium 5.3 3.6 - 5.5 mmol/L    Chloride 101 96 - 112 mmol/L    Co2 32 20 - 33 mmol/L    Glucose 100 (H) 65 - 99 mg/dL    Bun 16 8 - 22 mg/dL    Creatinine 0.84 0.50 - 1.40 mg/dL    Calcium 9.7 8.5 - 10.5 mg/dL    Anion Gap 6.0 0.0 - 11.9   BTYPE NATRIURETIC PEPTIDE    Collection Time: 11/08/17  8:35 AM   Result Value Ref Range    B Natriuretic Peptide 1109 (H) 0 - 100 pg/mL   ESTIMATED GFR    Collection  Time: 11/08/17  8:35 AM   Result Value Ref Range    GFR If African American >60 >60 mL/min/1.73 m 2    GFR If Non African American >60 >60 mL/min/1.73 m 2   ECHOCARDIOGRAM COMP W/O CONT    Collection Time: 11/08/17 12:34 PM   Result Value Ref Range    Eject.Frac. MOD BP 16.37     Eject.Frac. MOD 4C 17.65     Eject.Frac. MOD 2C 13.83     Left Ventrical Ejection Fraction 15        Imaging/Procedures Review:    Chest Xray:  Reviewed    EKG:   As in HPI. Paced rhythm.    MDM (Assessment and Plan):     Active Hospital Problems    Diagnosis   • Acute on chronic combined systolic and diastolic CHF (congestive heart failure) (CMS-[facility]) [I50.43]     Priority: High   • Essential hypertension [I10]     Priority: Medium   • Presence of biventricular AICD [Z95.810]     Priority: Medium   • Type 2 diabetes mellitus with diabetic polyneuropathy (CMS-[facility]) [E11.42]     Priority: Low   • Obstructive sleep apnea [G47.33]     Priority: Low   • ACC/AHA stage C systolic heart failure (CMS-[facility]) [I50.20]   • NYHA class 3 acute on chronic systolic heart failure (CMS-[facility]) [I50.23]   • Restrictive lung disease [J98.4]   • Lung cancer (CMS-[facility]) [C34.90]   • Type 2 diabetes mellitus without complication (CMS-HCC) [E11.9]   • LBBB (left bundle branch block) [I44.7]           Will switch to lasix 40 mg IV for more diuresis.  She is still volume up.  Continue ARNI (Entresto) at 50 mg po bid, Coreg 25 mg po bid.  Will add Spironolactone 12.5 mg po daily.    She will be a good candidate for Cardiomems. We will work her up as outpatient as well.    Thank you for referring this patient to our cardiology service.  We will follow patient with you.    Devyn Barajas MD.  [facility].

## 2017-11-08 NOTE — PROGRESS NOTES
Bedside report received from day shift RN, assumed pt care. Pt assessment complete. Pt aaox4. Reviewed plan of care with pt. Tele box on and rhythm verified. Pt on o2 2 liters with extension tubing in place. Chart and labs reviewed.  Bed in lowest position, call light within reach. Hourly rounding in place. Educated about calling for assistance.

## 2017-11-08 NOTE — PROGRESS NOTES
Pt sleeping with no signs of distress. Call light within reach, bed in lowest and locked position, hourly rounding in place.

## 2017-11-09 PROCEDURE — 700111 HCHG RX REV CODE 636 W/ 250 OVERRIDE (IP): Performed by: INTERNAL MEDICINE

## 2017-11-09 PROCEDURE — 700102 HCHG RX REV CODE 250 W/ 637 OVERRIDE(OP): Performed by: INTERNAL MEDICINE

## 2017-11-09 PROCEDURE — A9270 NON-COVERED ITEM OR SERVICE: HCPCS | Performed by: INTERNAL MEDICINE

## 2017-11-09 PROCEDURE — 770020 HCHG ROOM/CARE - TELE (206)

## 2017-11-09 PROCEDURE — 99232 SBSQ HOSP IP/OBS MODERATE 35: CPT | Performed by: INTERNAL MEDICINE

## 2017-11-09 RX ORDER — TORSEMIDE 20 MG/1
40 TABLET ORAL
Status: DISCONTINUED | OUTPATIENT
Start: 2017-11-09 | End: 2017-11-10 | Stop reason: HOSPADM

## 2017-11-09 RX ORDER — CARVEDILOL 6.25 MG/1
6.25 TABLET ORAL 2 TIMES DAILY WITH MEALS
Status: DISCONTINUED | OUTPATIENT
Start: 2017-11-09 | End: 2017-11-10 | Stop reason: HOSPADM

## 2017-11-09 RX ADMIN — DIGOXIN 125 MCG: 125 TABLET ORAL at 09:36

## 2017-11-09 RX ADMIN — FUROSEMIDE 40 MG: 10 INJECTION, SOLUTION INTRAMUSCULAR; INTRAVENOUS at 05:29

## 2017-11-09 RX ADMIN — METFORMIN HYDROCHLORIDE 500 MG: 500 TABLET, FILM COATED ORAL at 09:37

## 2017-11-09 RX ADMIN — HEPARIN SODIUM 5000 UNITS: 5000 INJECTION, SOLUTION INTRAVENOUS; SUBCUTANEOUS at 13:54

## 2017-11-09 RX ADMIN — ASPIRIN 325 MG: 325 TABLET, COATED ORAL at 09:36

## 2017-11-09 RX ADMIN — SACUBITRIL AND VALSARTAN 1 TABLET: 24; 26 TABLET, FILM COATED ORAL at 21:15

## 2017-11-09 RX ADMIN — SACUBITRIL AND VALSARTAN 1 TABLET: 24; 26 TABLET, FILM COATED ORAL at 09:44

## 2017-11-09 RX ADMIN — ZOLPIDEM TARTRATE 5 MG: 5 TABLET, FILM COATED ORAL at 22:29

## 2017-11-09 RX ADMIN — GABAPENTIN 600 MG: 300 CAPSULE ORAL at 09:36

## 2017-11-09 RX ADMIN — SPIRONOLACTONE 12.5 MG: 25 TABLET ORAL at 09:45

## 2017-11-09 RX ADMIN — HEPARIN SODIUM 5000 UNITS: 5000 INJECTION, SOLUTION INTRAVENOUS; SUBCUTANEOUS at 21:15

## 2017-11-09 RX ADMIN — METFORMIN HYDROCHLORIDE 500 MG: 500 TABLET, FILM COATED ORAL at 16:55

## 2017-11-09 RX ADMIN — TIOTROPIUM BROMIDE 1 CAPSULE: 18 CAPSULE ORAL; RESPIRATORY (INHALATION) at 09:47

## 2017-11-09 RX ADMIN — ALLOPURINOL 300 MG: 300 TABLET ORAL at 09:37

## 2017-11-09 RX ADMIN — HEPARIN SODIUM 5000 UNITS: 5000 INJECTION, SOLUTION INTRAVENOUS; SUBCUTANEOUS at 05:29

## 2017-11-09 RX ADMIN — TORSEMIDE 40 MG: 20 TABLET ORAL at 12:33

## 2017-11-09 RX ADMIN — GABAPENTIN 600 MG: 300 CAPSULE ORAL at 21:15

## 2017-11-09 RX ADMIN — GABAPENTIN 600 MG: 300 CAPSULE ORAL at 13:54

## 2017-11-09 ASSESSMENT — ENCOUNTER SYMPTOMS
ORTHOPNEA: 0
SENSORY CHANGE: 0
ABDOMINAL PAIN: 0
SPUTUM PRODUCTION: 0
LOSS OF CONSCIOUSNESS: 0
DIAPHORESIS: 0
DEPRESSION: 0
HALLUCINATIONS: 0
SPEECH CHANGE: 0
MYALGIAS: 0
DIZZINESS: 0
BLOOD IN STOOL: 0
TREMORS: 0
DIARRHEA: 0
PALPITATIONS: 0
FEVER: 0
PND: 0
HEADACHES: 0
VOMITING: 0
STRIDOR: 0
NAUSEA: 0
BRUISES/BLEEDS EASILY: 0
CHILLS: 0
EYE PAIN: 0
FALLS: 0
EYE DISCHARGE: 0
SHORTNESS OF BREATH: 0
CLAUDICATION: 0
WEIGHT LOSS: 0
BLURRED VISION: 0
COUGH: 0
HEARTBURN: 0
NERVOUS/ANXIOUS: 1
DOUBLE VISION: 0

## 2017-11-09 ASSESSMENT — PAIN SCALES - GENERAL
PAINLEVEL_OUTOF10: 0

## 2017-11-09 NOTE — PROGRESS NOTES
[facility]ist Progress Note    Date of Service: 2017    Chief Complaint  66 y.o. female admitted 2017 with shortness of breath and evidence of fluid overload with her lasix being decreased outpatient.    Interval Problem Update   she is requiring 2-3 liters oxygen  Weight is improved to her dry weight now    Consultants/Specialty  cardiology    Disposition  Home tomorrow with         Review of Systems   Constitutional: Negative for diaphoresis and fever.   HENT: Negative for congestion.    Respiratory: Negative for cough, sputum production, shortness of breath and stridor.         Patient is breathing easier    Cardiovascular: Negative for chest pain.   Gastrointestinal: Negative for abdominal pain, diarrhea, heartburn and nausea.   Genitourinary: Positive for frequency. Negative for dysuria.   Musculoskeletal: Negative for myalgias.   Neurological: Negative for dizziness, tremors and headaches.   Endo/Heme/Allergies: Does not bruise/bleed easily.   Psychiatric/Behavioral: The patient is nervous/anxious.       Physical Exam  Laboratory/Imaging   Hemodynamics  Temp (24hrs), Av.2 °C (97.1 °F), Min:36 °C (96.8 °F), Max:36.3 °C (97.3 °F)   Temperature: 36 °C (96.8 °F)  Pulse  Av.5  Min: 74  Max: 101    Blood Pressure : (!) 96/63 (rn notified)      Respiratory      Respiration: 18, Pulse Oximetry: 97 %     Work Of Breathing / Effort: Mild  RUL Breath Sounds: Clear, RML Breath Sounds: Diminished, RLL Breath Sounds: Diminished, SHUBHAM Breath Sounds: Clear, LLL Breath Sounds: Clear    Fluids    Intake/Output Summary (Last 24 hours) at 17 1356  Last data filed at 17 0900   Gross per 24 hour   Intake              940 ml   Output             2850 ml   Net            -1910 ml       Nutrition  Orders Placed This Encounter   Procedures   • Diet Order     Standing Status:   Standing     Number of Occurrences:   1     Order Specific Question:   Diet:     Answer:   Diabetic [3]     Order  Specific Question:   Diet:     Answer:   Cardiac [6]     Order Specific Question:   Consistency/Fluid modifications:     Answer:   1800 ml Fluid Restriction [10]     Physical Exam   Constitutional: No distress.   HENT:   Mouth/Throat: Oropharynx is clear and moist.   Eyes: Conjunctivae are normal. No scleral icterus.   Neck: No tracheal deviation present.   Cardiovascular: Normal rate, regular rhythm and intact distal pulses.    No murmur heard.  Pulmonary/Chest: Effort normal. No stridor. No respiratory distress. She has no wheezes. She has rales.   Abdominal: Soft. She exhibits no distension. There is no tenderness.   Musculoskeletal: She exhibits edema.   Trace edema   Neurological: She is alert. Coordination normal.   Skin: Skin is warm and dry. No rash noted. She is not diaphoretic. No erythema.   Psychiatric: Her behavior is normal.   Nursing note and vitals reviewed.      Recent Labs      11/07/17 0233   WBC  6.0   RBC  4.76   HEMOGLOBIN  14.3   HEMATOCRIT  42.9   MCV  90.1   MCH  30.0   MCHC  33.3*   RDW  46.6   PLATELETCT  188   MPV  10.9     Recent Labs      11/07/17 0233 11/08/17   0835   SODIUM  139  139   POTASSIUM  4.3  5.3   CHLORIDE  103  101   CO2  28  32   GLUCOSE  102*  100*   BUN  20  16   CREATININE  0.74  0.84   CALCIUM  9.4  9.7         Recent Labs      11/07/17 0233 11/08/17   0835   BNPBTYPENAT  1115*  1109*              Assessment/Plan     Acute on chronic combined systolic and diastolic CHF (congestive heart failure) (CMS-[facility])   Assessment & Plan    Blood pressure is stable but low in the 80's to 90's  Switch lasix to torsemide today after discussion with cardiology  To  Continue oxygen, patient uses it at home as well        Essential hypertension- (present on admission)   Assessment & Plan    Controlled, coreg dose decreased          Presence of biventricular AICD- (present on admission)   Assessment & Plan    No arrythmias         Restrictive lung disease- (present on  admission)   Assessment & Plan    No acute issues        Lung cancer (CMS-HCC)- (present on admission)   Assessment & Plan    Outpatient follow up, historical diagnosis        LBBB (left bundle branch block)- (present on admission)   Assessment & Plan    Pacemaker in place        Type 2 diabetes mellitus without complication (CMS-HCC)- (present on admission)   Assessment & Plan    blood sugars in good range and not needing coverage, resume metformin  Continue diabetic diet        Type 2 diabetes mellitus with diabetic polyneuropathy (CMS-HCC)- (present on admission)   Assessment & Plan    Gabapentin for pain        Obstructive sleep apnea- (present on admission)   Assessment & Plan    cpap at night              Reviewed items::  Labs reviewed and Medications reviewed  Reece catheter::  No Reece  DVT prophylaxis pharmacological::  Heparin  Ulcer Prophylaxis::  Not indicated

## 2017-11-09 NOTE — PROGRESS NOTES
Cardiology Progress Note               Author: Devyn To Date & Time created: 11/9/2017  10:46 AM     Mrs. Dang is a 66-year-old Caucasianfemale with history of ventricular fibrillation status post AICD implantation in 1998 (2nd prevention as she had V fib arrest at that time) with history of nonischemic cardiomyopathy whopresented to emergency room for evaluation of chest discomfort and not feeling well.  The patient was hospitalized in 1998 with congestive heart failure.  The left ventricular systolic function was markedly impaired at that time.  Cardiac catheterization, however, revealed very minimal coronary artery disease at that time.      She is now followed in our office by Dr. Felix Alexis and s/p BiV ICD.      Her last hospitalization for HF exacerbation was in 02/2016.     She is now in the hospital due to HF exacerbation again. She has been diuresed with BNP still at 1100s.    Negative 0.6 liters over the last 24 hours.  Negative 2.8 liters during hospital stay.      Interval History:  No telemetry events.  No acute events overnight.    Review of Systems   Constitutional: Negative for chills, fever, malaise/fatigue and weight loss.   HENT: Negative for ear discharge, ear pain, hearing loss and nosebleeds.    Eyes: Negative for blurred vision, double vision, pain and discharge.   Respiratory: Negative for cough and shortness of breath.    Cardiovascular: Negative for chest pain, palpitations, orthopnea, claudication, leg swelling and PND.   Gastrointestinal: Negative for abdominal pain, blood in stool, melena, nausea and vomiting.   Genitourinary: Negative for dysuria and hematuria.   Musculoskeletal: Negative for falls, joint pain and myalgias.   Skin: Negative for itching and rash.   Neurological: Negative for dizziness, sensory change, speech change, loss of consciousness and headaches.   Endo/Heme/Allergies: Negative for environmental allergies. Does not bruise/bleed easily.    Psychiatric/Behavioral: Negative for depression, hallucinations and suicidal ideas.       Physical Exam   Constitutional: She is oriented to person, place, and time. No distress.   HENT:   Head: Normocephalic and atraumatic.   Eyes: EOM are normal.   Neck: No JVD present.   Cardiovascular: Normal rate, regular rhythm, normal heart sounds and intact distal pulses.  Exam reveals no gallop and no friction rub.    No murmur heard.  Pulmonary/Chest: No respiratory distress. She has no wheezes. She has no rales. She exhibits no tenderness.   Abdominal: She exhibits no distension. There is no tenderness. There is no rebound and no guarding.   Musculoskeletal: She exhibits no edema or tenderness.   Lymphadenopathy:     She has no cervical adenopathy.   Neurological: She is alert and oriented to person, place, and time.   Skin: Skin is dry.   Psychiatric: She has a normal mood and affect.   Nursing note and vitals reviewed.      Hemodynamics:  Temp (24hrs), Av.2 °C (97.1 °F), Min:36.1 °C (96.9 °F), Max:36.3 °C (97.3 °F)  Temperature: 36.2 °C (97.2 °F)  Pulse  Av.4  Min: 74  Max: 101   Blood Pressure : (!) 90/56 (rn notified)     Respiratory:    Respiration: 18, Pulse Oximetry: 92 %     Work Of Breathing / Effort: Mild  RUL Breath Sounds: Clear, RML Breath Sounds: Diminished, RLL Breath Sounds: Diminished, SHUBHAM Breath Sounds: Clear, LLL Breath Sounds: Clear  Fluids:  Date 17 0700 - 11/10/17 0659   Shift 3319-1495 4370-2759 6700-0268 24 Hour Total   I  N  T  A  K  E   Shift Total       O  U  T  P  U  T   Urine 1500   1500    Shift Total 1500   1500   Weight (kg) 71.8 71.8 71.8 71.8       Weight: 71.8 kg (158 lb 4.6 oz)  GI/Nutrition:  Orders Placed This Encounter   Procedures   • Diet Order     Standing Status:   Standing     Number of Occurrences:   1     Order Specific Question:   Diet:     Answer:   Diabetic [3]     Order Specific Question:   Diet:     Answer:   Cardiac [6]     Order Specific Question:    Consistency/Fluid modifications:     Answer:   1800 ml Fluid Restriction [10]     Lab Results:  Recent Labs      11/07/17 0233   WBC  6.0   RBC  4.76   HEMOGLOBIN  14.3   HEMATOCRIT  42.9   MCV  90.1   MCH  30.0   MCHC  33.3*   RDW  46.6   PLATELETCT  188   MPV  10.9     Recent Labs      11/07/17 0233  11/08/17   0835   SODIUM  139  139   POTASSIUM  4.3  5.3   CHLORIDE  103  101   CO2  28  32   GLUCOSE  102*  100*   BUN  20  16   CREATININE  0.74  0.84   CALCIUM  9.4  9.7         Recent Labs      11/07/17 0233  11/08/17   0835   BNPBTYPENAT  1115*  1109*     Recent Labs      11/07/17 0233 11/08/17   0835   BNPBTYPENAT  1115*  1109*             Medical Decision Making, by Problem:  Active Hospital Problems    Diagnosis   • Acute on chronic combined systolic and diastolic CHF (congestive heart failure) (CMS-HCC) [I50.43]   • Essential hypertension [I10]   • Presence of biventricular AICD [Z95.810]   • Type 2 diabetes mellitus with diabetic polyneuropathy (CMS-HCC) [E11.42]   • Obstructive sleep apnea [G47.33]   • ACC/AHA stage C systolic heart failure (CMS-HCC) [I50.20]   • NYHA class 3 acute on chronic systolic heart failure (CMS-HCC) [I50.23]   • Restrictive lung disease [J98.4]   • Lung cancer (CMS-HCC) [C34.90]   • Type 2 diabetes mellitus without complication (CMS-HCC) [E11.9]   • LBBB (left bundle branch block) [I44.7]       Plan:    Euvolemic right now. Dry weight is 71.8 kg.  Will switch IV lasix to torsemide 40 mg po daily.  Continue ARNI at 50 mg po bid. Coreg 25 mg po bid. Spironolactone 12.5 mg po daily.    She will be a good candidate for Cardiomems. We will work her up as outpatient as well.    Thank you for referring this patient to our cardiology service.    Devyn Barajas MD.  [facility].      Reviewed items::  EKG reviewed, Radiology images reviewed, Labs reviewed and Medications reviewed

## 2017-11-09 NOTE — PROGRESS NOTES
Pt sleeping comfortably with no signs of distress, telebox on rhythm verified. Bed in lowest and locked position. Call light within reach. Hourly rounding in place.

## 2017-11-09 NOTE — PROGRESS NOTES
Report received at the bed side. No family at bedside. No complains of pain or SOB. Call light and belongings within reach. Bed alarm in place.

## 2017-11-09 NOTE — CARE PLAN
Problem: Safety  Goal: Will remain free from injury  Outcome: PROGRESSING AS EXPECTED    Goal: Will remain free from falls  Outcome: PROGRESSING AS EXPECTED      Problem: Respiratory:  Goal: Respiratory status will improve  Outcome: PROGRESSING AS EXPECTED      Problem: Fluid Volume:  Goal: Will maintain balanced intake and output  Outcome: PROGRESSING AS EXPECTED      Problem: Pain Management  Goal: Pain level will decrease to patient's comfort goal  Outcome: PROGRESSING AS EXPECTED

## 2017-11-09 NOTE — CARE PLAN
Problem: Safety  Goal: Will remain free from injury  Pt mobility assessed at the beginning of the shift. Fall precautions in place, non-slip socks on, bed in lowest, locked position, and call light is within reach. Pt educated to call for assistance, and verbalizes understanding.    Problem: Knowledge Deficit  Goal: Knowledge of disease process/condition, treatment plan, diagnostic tests, and medications will improve  Educated pt on disease process, treatment plan, and reason for medications she is on.

## 2017-11-09 NOTE — PROGRESS NOTES
Bedside report received from day shift RN, assumed pt care. Pt assessment complete with no signs of distress, pt denies any pain. Pt aaox4. Reviewed plan of care with pt. Tele box on and rhythm verified. Pt on o2 2 liters with extension tubing in place  Chart and labs reviewed.  Bed in lowest position, call light within reach. Hourly rounding in place. Educated about calling for assistance.

## 2017-11-09 NOTE — PROGRESS NOTES
Pt resting in chair, no complains of pain or SOB, no apparent signs of distress, call light and fall precaution in place.  at the bedside.

## 2017-11-09 NOTE — CARE PLAN
Problem: Respiratory:  Goal: Respiratory status will improve  Outcome: PROGRESSING AS EXPECTED  Pt has O2 at home for night use only. Pt has been able to ambulate with o2 without needs increasing or SOB. Pt has denied any sob, educated on calling for assistance or at any signs of SOB.     Problem: Fluid Volume:  Goal: Will maintain balanced intake and output  Outcome: PROGRESSING AS EXPECTED  pts lasix has been changed back it IV instead of PO, Pt has been able to void adequate amounts. Continue to monitor I&Os, educate pt about fluid restriction she is able to verbalize understanding. Monitor for signs of improving respiratory status.

## 2017-11-09 NOTE — NON-PROVIDER
[facility]    Date of Service: 2017    Chief Complaint  66 y.o. female admitted 2017 with shortness of breath and fluid overload    Interval Problem Update  Patient was sitting upright in her chair watching TV without any distress. She reports having dyspnea on exertion when using the bathroom and she is on 2-3 liters of O2 to manage. Recent echo showed about a 15% ejection fraction. We will monitor closely.     Consultants/Specialty  Cardiologist    Disposition  Go home tomorrow with her .        Review of Systems:  Constitutional: Negative for chills and fever.   HENT: Negative for congestion, eye pain, or blurry vision.    Respiratory: Negative for cough. Positive for shortness of breath if no canula.     Cardiovascular: Negative for chest pain.   Gastrointestinal: Negative for abdominal pain, nausea, vomiting, or diarrhea.    Genitourinary: Positive for frequency (d/t LASIX). Negative for dysuria or suprapubic pain.  Musculoskeletal: Negative for myalgias or stiffness.   Neurological: Negative for dizziness, headaches, or weakness.   Endo/Heme/Allergies: Does not bruise/bleed easily.   Psychiatric/Behavioral: The patient is nervous/anxious. Normal mood. Negative for depression.          Physical Exam  Laboratory/Imaging   Hemodynamics  Temp (24hrs), Av.2 °C (97.1 °F), Min:36 °C (96.8 °F), Max:36.3 °C (97.3 °F)   Temperature: 36 °C (96.8 °F)  Pulse  Av.5  Min: 74  Max: 101    Blood Pressure : (!) 96/63 (rn notified)      Respiratory      Respiration: 18, Pulse Oximetry: 97 %     Work Of Breathing / Effort: Mild  RUL Breath Sounds: Clear, RML Breath Sounds: Diminished, RLL Breath Sounds: Diminished, SHUBHAM Breath Sounds: Clear, LLL Breath Sounds: Clear    Fluids    Intake/Output Summary (Last 24 hours) at 17 1425  Last data filed at 17 0900   Gross per 24 hour   Intake              940 ml   Output             2850 ml   Net            -1910 ml        Nutrition  Orders Placed This Encounter   Procedures   • Diet Order     Standing Status:   Standing     Number of Occurrences:   1     Order Specific Question:   Diet:     Answer:   Diabetic [3]     Order Specific Question:   Diet:     Answer:   Cardiac [6]     Order Specific Question:   Consistency/Fluid modifications:     Answer:   1800 ml Fluid Restriction [10]     Physical Exam    Constitutional: No distress.   HENT:   Mouth/Throat: Oropharynx is clear and moist.   Eyes: Conjunctivae are normal. No scleral icterus.   Neck: No tracheal deviation present. Negative for enlarged thyroid.   Cardiovascular: Normal rate and rhythm. Distant heart sounds. Normal capillary refill.     No murmur heard.  Pulmonary/Chest: Negative for wheezing, rhonchi, and rub. Positive for some bibasilar crackles.   Abdominal: Soft. Negative for tenderness, distention, or pain. No ascites.    Musculoskeletal: She exhibits edema in the lower extremities.   Neurological: She is alert and oriented to time, place, and event. Gait is normal.    Skin: Skin is warm and dry. No rash noted. She is not diaphoretic. No erythema.   Psychiatric: Her behavior and judgement is normal.   Nursing note and vitals reviewed.    Recent Labs      11/07/17 0233   WBC  6.0   RBC  4.76   HEMOGLOBIN  14.3   HEMATOCRIT  42.9   MCV  90.1   MCH  30.0   MCHC  33.3*   RDW  46.6   PLATELETCT  188   MPV  10.9     Recent Labs      11/07/17 0233  11/08/17   0835   SODIUM  139  139   POTASSIUM  4.3  5.3   CHLORIDE  103  101   CO2  28  32   GLUCOSE  102*  100*   BUN  20  16   CREATININE  0.74  0.84   CALCIUM  9.4  9.7         Recent Labs      11/07/17 0233 11/08/17   0835   BNPBTYPENAT  1115*  1109*              Assessment/Plan     Active Problems:    Acute on chronic combined systolic and diastolic CHF (congestive heart failure) (CMS-HCC) POA: Unknown    Presence of biventricular AICD POA: Yes    Essential hypertension POA: Yes    Obstructive sleep apnea POA:  Yes    Type 2 diabetes mellitus with diabetic polyneuropathy (CMS-HCC) POA: Yes    Type 2 diabetes mellitus without complication (CMS-[facility]) POA: Yes    LBBB (left bundle branch block) POA: Yes    Lung cancer (CMS-[facility]) POA: Yes    Restrictive lung disease POA: Yes    ACC/AHA stage C systolic heart failure (CMS-[facility]) POA: Unknown    NYHA class 3 acute on chronic systolic heart failure (CMS-HCC) POA: Unknown  Resolved Problems:    Acute respiratory failure with hypoxia ([facility]) POA: Yes      Assessment and Plan.  1. Acute on chronic CHF- Patient's blood pressure is low in the 90-95's systolic has been stable. The furosemide was switched to the Torsemide after consulting with cardio. Patient will remain on O2 canula after discharge to help with the HF symptoms.     2. Essential HTN- Patient will remain on coreg to control Blood pressure.     3. ACID/Pacemaker- Patient has no issues with any of the devices placed inside chest region. Will monitor accordingly.     4. ISMAEL- Patient uses the CPAP at night which is working very well.     5. Type 2 DM- Patient will continue diabetic/ cardio diet and continue with metformin to control blood glucose levels.     6. Diabetic neuropathy- Patient will continue the gabapentin for pain management.    Core Measures:  Reviewed items::  Labs reviewed and Medications reviewed  Reece catheter::  No Reece  DVT prophylaxis pharmacological::  Heparin  Ulcer Prophylaxis::  Not indicated

## 2017-11-10 ENCOUNTER — PATIENT MESSAGE (OUTPATIENT)
Dept: HEALTH INFORMATION MANAGEMENT | Facility: OTHER | Age: 67
End: 2017-11-10

## 2017-11-10 VITALS
TEMPERATURE: 96.9 F | RESPIRATION RATE: 18 BRPM | DIASTOLIC BLOOD PRESSURE: 64 MMHG | OXYGEN SATURATION: 95 % | HEART RATE: 96 BPM | BODY MASS INDEX: 28.68 KG/M2 | HEIGHT: 62 IN | WEIGHT: 155.87 LBS | SYSTOLIC BLOOD PRESSURE: 106 MMHG

## 2017-11-10 LAB
ANION GAP SERPL CALC-SCNC: 8 MMOL/L (ref 0–11.9)
BUN SERPL-MCNC: 24 MG/DL (ref 8–22)
CALCIUM SERPL-MCNC: 9.8 MG/DL (ref 8.5–10.5)
CHLORIDE SERPL-SCNC: 101 MMOL/L (ref 96–112)
CO2 SERPL-SCNC: 29 MMOL/L (ref 20–33)
CREAT SERPL-MCNC: 0.73 MG/DL (ref 0.5–1.4)
GFR SERPL CREATININE-BSD FRML MDRD: >60 ML/MIN/1.73 M 2
GLUCOSE SERPL-MCNC: 104 MG/DL (ref 65–99)
MAGNESIUM SERPL-MCNC: 1.8 MG/DL (ref 1.5–2.5)
POTASSIUM SERPL-SCNC: 4.1 MMOL/L (ref 3.6–5.5)
SODIUM SERPL-SCNC: 138 MMOL/L (ref 135–145)

## 2017-11-10 PROCEDURE — 700102 HCHG RX REV CODE 250 W/ 637 OVERRIDE(OP): Performed by: INTERNAL MEDICINE

## 2017-11-10 PROCEDURE — 700111 HCHG RX REV CODE 636 W/ 250 OVERRIDE (IP): Performed by: INTERNAL MEDICINE

## 2017-11-10 PROCEDURE — 80048 BASIC METABOLIC PNL TOTAL CA: CPT

## 2017-11-10 PROCEDURE — 36415 COLL VENOUS BLD VENIPUNCTURE: CPT

## 2017-11-10 PROCEDURE — 99239 HOSP IP/OBS DSCHRG MGMT >30: CPT | Performed by: INTERNAL MEDICINE

## 2017-11-10 PROCEDURE — 83735 ASSAY OF MAGNESIUM: CPT

## 2017-11-10 PROCEDURE — A9270 NON-COVERED ITEM OR SERVICE: HCPCS | Performed by: INTERNAL MEDICINE

## 2017-11-10 RX ORDER — CARVEDILOL 6.25 MG/1
6.25 TABLET ORAL 2 TIMES DAILY WITH MEALS
Qty: 60 TAB | Refills: 2 | Status: SHIPPED | OUTPATIENT
Start: 2017-11-10 | End: 2017-11-30 | Stop reason: SDUPTHER

## 2017-11-10 RX ORDER — TORSEMIDE 20 MG/1
40 TABLET ORAL DAILY
Qty: 30 TAB | Refills: 3 | Status: SHIPPED | OUTPATIENT
Start: 2017-11-10 | End: 2017-11-11

## 2017-11-10 RX ORDER — SPIRONOLACTONE 25 MG/1
12.5 TABLET ORAL DAILY
Qty: 30 TAB | Refills: 3 | Status: SHIPPED | OUTPATIENT
Start: 2017-11-10 | End: 2017-11-13

## 2017-11-10 RX ADMIN — HEPARIN SODIUM 5000 UNITS: 5000 INJECTION, SOLUTION INTRAVENOUS; SUBCUTANEOUS at 05:47

## 2017-11-10 RX ADMIN — ASPIRIN 325 MG: 325 TABLET, COATED ORAL at 08:24

## 2017-11-10 RX ADMIN — DIGOXIN 125 MCG: 125 TABLET ORAL at 08:25

## 2017-11-10 RX ADMIN — GABAPENTIN 600 MG: 300 CAPSULE ORAL at 08:24

## 2017-11-10 RX ADMIN — SACUBITRIL AND VALSARTAN 1 TABLET: 24; 26 TABLET, FILM COATED ORAL at 08:29

## 2017-11-10 RX ADMIN — CARVEDILOL 6.25 MG: 6.25 TABLET, FILM COATED ORAL at 08:26

## 2017-11-10 RX ADMIN — SPIRONOLACTONE 12.5 MG: 25 TABLET ORAL at 08:25

## 2017-11-10 RX ADMIN — TORSEMIDE 40 MG: 20 TABLET ORAL at 08:30

## 2017-11-10 RX ADMIN — TIOTROPIUM BROMIDE 1 CAPSULE: 18 CAPSULE ORAL; RESPIRATORY (INHALATION) at 08:30

## 2017-11-10 RX ADMIN — ALLOPURINOL 300 MG: 300 TABLET ORAL at 08:25

## 2017-11-10 RX ADMIN — METFORMIN HYDROCHLORIDE 500 MG: 500 TABLET, FILM COATED ORAL at 08:25

## 2017-11-10 ASSESSMENT — ENCOUNTER SYMPTOMS
COUGH: 0
BLURRED VISION: 0
VOMITING: 0
CHILLS: 0
BRUISES/BLEEDS EASILY: 0
LOSS OF CONSCIOUSNESS: 0
BLOOD IN STOOL: 0
PALPITATIONS: 0
SHORTNESS OF BREATH: 0
WEIGHT LOSS: 0
DEPRESSION: 0
ABDOMINAL PAIN: 0
FALLS: 0
ORTHOPNEA: 0
EYE DISCHARGE: 0
FEVER: 0
DIZZINESS: 0
MYALGIAS: 0
PND: 0
CLAUDICATION: 0
DOUBLE VISION: 0
NAUSEA: 0
SENSORY CHANGE: 0
HALLUCINATIONS: 0
SPEECH CHANGE: 0
HEADACHES: 0
EYE PAIN: 0

## 2017-11-10 ASSESSMENT — PAIN SCALES - GENERAL: PAINLEVEL_OUTOF10: 0

## 2017-11-10 NOTE — CARE PLAN
Problem: Safety  Goal: Will remain free from injury  Outcome: PROGRESSING AS EXPECTED  Fall risk assessed, pt educated. Bed in lowest and locked position, call light and personal possessions within reach, hourly rounding in place.     Problem: Knowledge Deficit  Goal: Knowledge of disease process/condition, treatment plan, diagnostic tests, and medications will improve  Outcome: PROGRESSING AS EXPECTED  Pt educated about medications with each administration. All questions answered.

## 2017-11-10 NOTE — PROGRESS NOTES
Cardiology Progress Note               Author: Devyn To Date & Time created: 11/10/2017  9:21 AM     Mrs. Dang is a 66-year-old Caucasianfemale with history of ventricular fibrillation status post AICD implantation in 1998 (2nd prevention as she had V fib arrest at that time) with history of nonischemic cardiomyopathy whopresented to emergency room for evaluation of chest discomfort and not feeling well.  The patient was hospitalized in 1998 with congestive heart failure.  The left ventricular systolic function was markedly impaired at that time.  Cardiac catheterization, however, revealed very minimal coronary artery disease at that time.      She is now followed in our office by Dr. Felix Alexis and s/p BiV ICD.      Her last hospitalization for HF exacerbation was in 02/2016.     She is now in the hospital due to HF exacerbation again. She has been diuresed with BNP still at 1100s.    Negative 1.6 liters over the last 24 hours.  Negative 4.5 liters during hospital stay.      Interval History:  No telemetry events.  No acute events overnight.    Review of Systems   Constitutional: Negative for chills, fever, malaise/fatigue and weight loss.   HENT: Negative for ear discharge, ear pain, hearing loss and nosebleeds.    Eyes: Negative for blurred vision, double vision, pain and discharge.   Respiratory: Negative for cough and shortness of breath.    Cardiovascular: Negative for chest pain, palpitations, orthopnea, claudication, leg swelling and PND.   Gastrointestinal: Negative for abdominal pain, blood in stool, melena, nausea and vomiting.   Genitourinary: Negative for dysuria and hematuria.   Musculoskeletal: Negative for falls, joint pain and myalgias.   Skin: Negative for itching and rash.   Neurological: Negative for dizziness, sensory change, speech change, loss of consciousness and headaches.   Endo/Heme/Allergies: Negative for environmental allergies. Does not bruise/bleed easily.    Psychiatric/Behavioral: Negative for depression, hallucinations and suicidal ideas.       Physical Exam   Constitutional: She is oriented to person, place, and time. No distress.   HENT:   Head: Normocephalic and atraumatic.   Eyes: EOM are normal.   Neck: No JVD present.   Cardiovascular: Normal rate, regular rhythm, normal heart sounds and intact distal pulses.  Exam reveals no gallop and no friction rub.    No murmur heard.  Pulmonary/Chest: No respiratory distress. She has no wheezes. She has no rales. She exhibits no tenderness.   Abdominal: She exhibits no distension. There is no tenderness. There is no rebound and no guarding.   Musculoskeletal: She exhibits no edema or tenderness.   Lymphadenopathy:     She has no cervical adenopathy.   Neurological: She is alert and oriented to person, place, and time.   Skin: Skin is dry.   Psychiatric: She has a normal mood and affect.   Nursing note and vitals reviewed.      Hemodynamics:  Temp (24hrs), Av °C (96.8 °F), Min:35.9 °C (96.7 °F), Max:36.1 °C (96.9 °F)  Temperature: 36 °C (96.8 °F)  Pulse  Av.3  Min: 67  Max: 101   Blood Pressure : (!) 95/60 (rn notified)     Respiratory:    Respiration: 16, Pulse Oximetry: 95 %        RUL Breath Sounds: Clear, RML Breath Sounds: Diminished, RLL Breath Sounds: Diminished, SHUBHAM Breath Sounds: Clear, LLL Breath Sounds: Diminished  Fluids:  Date 17 0700 - 11/10/17 0659   Shift 8669-0057 3284-4548 5103-1100 24 Hour Total   I  N  T  A  K  E   Shift Total       O  U  T  P  U  T   Urine 1500   1500    Shift Total 1500   1500   Weight (kg) 71.8 71.8 71.8 71.8       Weight: 70.7 kg (155 lb 13.8 oz)  GI/Nutrition:  Orders Placed This Encounter   Procedures   • Diet Order     Standing Status:   Standing     Number of Occurrences:   1     Order Specific Question:   Diet:     Answer:   Diabetic [3]     Order Specific Question:   Diet:     Answer:   Cardiac [6]     Order Specific Question:   Consistency/Fluid modifications:      Answer:   1800 ml Fluid Restriction [10]     Lab Results:      Recent Labs      11/08/17   0835  11/10/17   0152   SODIUM  139  138   POTASSIUM  5.3  4.1   CHLORIDE  101  101   CO2  32  29   GLUCOSE  100*  104*   BUN  16  24*   CREATININE  0.84  0.73   CALCIUM  9.7  9.8         Recent Labs      11/08/17   0835   BNPBTYPENAT  1109*     Recent Labs      11/08/17   0835   BNPBTYPENAT  1109*             Medical Decision Making, by Problem:  Active Hospital Problems    Diagnosis   • Acute on chronic combined systolic and diastolic CHF (congestive heart failure) (CMS-HCC) [I50.43]   • Essential hypertension [I10]   • Presence of biventricular AICD [Z95.810]   • Type 2 diabetes mellitus with diabetic polyneuropathy (CMS-HCC) [E11.42]   • Obstructive sleep apnea [G47.33]   • ACC/AHA stage C systolic heart failure (CMS-HCC) [I50.20]   • NYHA class 3 acute on chronic systolic heart failure (CMS-HCC) [I50.23]   • Restrictive lung disease [J98.4]   • Lung cancer (CMS-HCC) [C34.90]   • Type 2 diabetes mellitus without complication (CMS-HCC) [E11.9]   • LBBB (left bundle branch block) [I44.7]       Plan:    Euvolemic right now. Dry weight is 71.8 kg.  Continue torsemide 40 mg po daily.  Continue ARNI at 50 mg po bid. Coreg 25 mg po bid. Spironolactone 12.5 mg po daily.    She will be a good candidate for Cardiomems. We will work her up as outpatient as well.    Thank you for referring this patient to our cardiology service.    Devyn Barajas MD.  [facility].      Reviewed items::  EKG reviewed, Radiology images reviewed, Labs reviewed and Medications reviewed

## 2017-11-10 NOTE — PROGRESS NOTES
Report received at bedside, patient care assumed, tele box on. Pt AAO x 4, no signs of distress noted at this time. POC discussed with pt and all questions answered. No complaint of pain at this time. Pt denies any additional needs at this time. Bed in lowest position, pt educated on fall risk and verbalized understanding. Call light and personal possessions within reach. Will continue to monitor.

## 2017-11-10 NOTE — PROGRESS NOTES
Discharge instructions given and discussed. Pt verbalized understanding and all questions answered. electronic prescriptions provided. Pt discharged home in stable condition on 2 O2 via car with . Personal belongings w/ patient. VSS, IV removed and tolerated well. Tele box removed, monitor room notified.

## 2017-11-10 NOTE — PROGRESS NOTES
Report received at the bed side. No family at bedside. Pt sleeping. No signs of distress or SOB. Call light and belongings within reach. Bed alarm in place.

## 2017-11-10 NOTE — DISCHARGE INSTRUCTIONS
Discharge Instructions    Discharged to home by car with relative. Discharged via wheelchair, hospital escort: Refused.  Special equipment needed: Oxygen    Be sure to schedule a follow-up appointment with your primary care doctor or any specialists as instructed.     Discharge Plan:   Influenza Vaccine Indication: Not indicated: Previously immunized this influenza season and > 8 years of age    I understand that a diet low in cholesterol, fat, and sodium is recommended for good health. Unless I have been given specific instructions below for another diet, I accept this instruction as my diet prescription.   Other diet: cardiac    Special Instructions:     HF Patient Discharge Instructions  · Monitor your weight daily, and maintain a weight chart, to track your weight changes.   · Activity as tolerated, unless your Doctor has ordered otherwise.  · Follow a low fat, low cholesterol, low salt diet unless instructed otherwise by your Doctor. Read the labels on the back of food products and track your intake of fat, cholesterol and salt.   · Fluid Restriction Yes. If a Fluid Restriction has been ordered by your Doctor, measure fluids with a measuring cup to ensure that you are not exceeding the restriction.   · No smoking.  · Oxygen Yes. If your Doctor has ordered that you wear Oxygen at home, it is important to wear it as ordered.  · Did you receive an explanation from staff on the importance of taking each of your medications and why it is necessary to keep taking them unless your doctor says to stop? Yes  · Were all of your questions answered about how to manage your heart failure and what to do if you have increased signs and symptoms after you go home? Yes  · Do you feel like your heart failure care team involved you in the care treatment plan and allowed you to make decisions regarding your care while in the hospital and addressed any discharge needs you might have? Yes    See the educational handout provided at  discharge for more information on monitoring your daily weight, activity and diet. This also explains more about Heart Failure, symptoms of a flare-up and some of the tests that you have undergone.     Warning Signs of a Flare-Up include:  · Swelling in the ankles or lower legs.  · Shortness of breath, while at rest, or while doing normal activities.   · Shortness of breath at night when in bed, or coughing in bed.   · Requiring more pillows to sleep at night, or needing to sit up at night to sleep.  · Feeling weak, dizzy or fatigued.     When to call your Doctor:  · Call [facility] seven days a week from 8:00 a.m. to 8:00 p.m. for medical questions [phone].  · Call your Primary Care Physician or Cardiologist if:   1. You experience any pain radiating to your jaw or neck.  2. You have any difficulty breathing.  3. You experience weight gain of 3 lbs in a day or 5 lbs in a week.   4. You feel any palpitations or irregular heartbeats.  5. You become dizzy or lose consciousness.   If you have had an angiogram or had a pacemaker or AICD placed, and experience:  1. Bleeding, drainage or swelling at the surgical / puncture site.  2. Fever greater than 100.0 F  3. Shock from internal defibrillator.  4. Cool and / or numb extremities.      · Is patient discharged on Warfarin / Coumadin?   No     · Is patient Post Blood Transfusion?  No    Depression / Suicide Risk    As you are discharged from this [facility], it is important to learn how to keep safe from harming yourself.    Recognize the warning signs:  · Abrupt changes in personality, positive or negative- including increase in energy   · Giving away possessions  · Change in eating patterns- significant weight changes-  positive or negative  · Change in sleeping patterns- unable to sleep or sleeping all the time   · Unwillingness or inability to communicate  · Depression  · Unusual sadness, discouragement and loneliness  · Talk of wanting  to die  · Neglect of personal appearance   · Rebelliousness- reckless behavior  · Withdrawal from people/activities they love  · Confusion- inability to concentrate     If you or a loved one observes any of these behaviors or has concerns about self-harm, here's what you can do:  · Talk about it- your feelings and reasons for harming yourself  · Remove any means that you might use to hurt yourself (examples: pills, rope, extension cords, firearm)  · Get professional help from the community (Mental Health, Substance Abuse, psychological counseling)  · Do not be alone:Call your Safe Contact- someone whom you trust who will be there for you.  · Call your local CRISIS HOTLINE 587-3399 or 247-866-8755  · Call your local Children's Mobile Crisis Response Team [place] (873) 508-8788 or www.Netcontinuum  · Call the toll free National Suicide Prevention Hotlines   · National Suicide Prevention Lifeline 098-242-MWWJ (4013)  · National Hope Line Network 800-SUICIDE (317-7963)

## 2017-11-11 RX ORDER — TORSEMIDE 20 MG/1
40 TABLET ORAL DAILY
Qty: 30 TAB | Refills: 3 | Status: SHIPPED | OUTPATIENT
Start: 2017-11-11 | End: 2017-11-16 | Stop reason: SDUPTHER

## 2017-11-11 NOTE — DISCHARGE SUMMARY
CHIEF COMPLAINT ON ADMISSION  Chief Complaint   Patient presents with   • Shortness of Breath     SOB chronic but worse in the last couple weeks. Pt has CHF. Placed recently on ENTRESTO and taken off Losartan. Worse with exersion.        CODE STATUS  full    HPI & HOSPITAL COURSE  This is a 66 y.o. female here with shortness of breath  And was found to have acute on chronic systolic heart failure with exacerbation. She was treated with lasix for diuresis and echocardiogram showed worsening ejection fraction from 35 to 15%. Cardiology was consulted and switched lasix to torsemide. Her breathing improved and she was on 2 liters oxygen by nasal canula which she is on at home with saturations in the 90's. Cardiology agreed with close outpatient follow up in their clinic.    Therefore, she is discharged in fair and stable condition with close outpatient follow-up.    SPECIFIC OUTPATIENT FOLLOW-UP  Cardiology  Primary care provider    DISCHARGE PROBLEM LIST  Active Problems:    Acute on chronic combined systolic and diastolic CHF (congestive heart failure) (CMS-HCC) POA: Unknown    Presence of biventricular AICD POA: Yes    Essential hypertension POA: Yes    Obstructive sleep apnea POA: Yes    Type 2 diabetes mellitus with diabetic polyneuropathy (CMS-[facility]) POA: Yes    Type 2 diabetes mellitus without complication (CMS-[facility]) POA: Yes    LBBB (left bundle branch block) POA: Yes    Lung cancer (CMS-[facility]) POA: Yes    Restrictive lung disease POA: Yes    ACC/AHA stage C systolic heart failure (CMS-HCC) POA: Unknown    NYHA class 3 acute on chronic systolic heart failure (CMS-HCC) POA: Unknown  Resolved Problems:    Acute respiratory failure with hypoxia (HCC) POA: Yes      FOLLOW UP  Future Appointments  Date Time Provider Department Center   11/13/2017 9:20 AM CARE MANAGER [facility]   11/16/2017 8:15 AM Gregorio Betancourt M.D. [facility] None   11/16/2017 1:00 PM JENN Avina   12/4/2017 8:30 AM [facility]  [facility] None   12/6/2017 9:40 AM JEAN CLAUDE Co[facility] None   12/18/2017 10:15 AM ECHO [facility] [address]   1/10/2018 10:15 AM PACER CHECK-CAM B 2 [facility] None   1/10/2018 10:40 AM Felix Alexis M.D. [facility] None   1/10/2018 11:30 AM Gregorio Betancourt M.D. [facility] None     Edwin Urias M.D.  [address]  [address]  [facility] [address]  [phone]    Schedule an appointment as soon as possible for a visit in 1 week  Hospital follow-up appointment with PCP      MEDICATIONS ON DISCHARGE   Isatu Dang   Home Medication Instructions LEE:[id number]    Printed on:11/11/17 0738   Medication Information                      albuterol (PROAIR HFA) 108 (90 BASE) MCG/ACT Aero Soln inhalation aerosol  Inhale 2 Puffs by mouth every 6 hours as needed for Shortness of Breath.             allopurinol (ZYLOPRIM) 300 MG TABS  Take 300 mg by mouth every day.             aspirin (ASA) 325 MG TABS  Take 325 mg by mouth every bedtime.             Calcium Carbonate-Vitamin D (CALCIUM 600+D) 600-200 MG-UNIT TABS  Take 2 Tabs by mouth every day.             carvedilol (COREG) 6.25 MG Tab  Take 1 Tab by mouth 2 times a day, with meals.             citalopram (CELEXA) 20 MG Tab  TAKE ONE TABLET BY MOUTH ONCE DAILY             Cyanocobalamin (VITAMIN B-12 SL)  Place 1 Tab under tongue every day.             digoxin (LANOXIN) 125 MCG Tab  Take 1 Tab by mouth every day.             gabapentin (NEURONTIN) 300 MG CAPS  Take 600 mg by mouth 3 times a day.             loperamide (IMODIUM) 2 MG Cap  Take 2 mg by mouth 4 times a day as needed for Diarrhea.             metformin (GLUCOPHAGE) 500 MG Tab  Take 1 Tab by mouth 2 times a day, with meals.             Multiple Vitamin (MULTI-VITAMINS) TABS  Take 1 Tab by mouth every day.             sacubitril-valsartan (ENTRESTO) 24-26 MG Tab tablet  Take 1 Tab by mouth 2 Times a Day.             spironolactone (ALDACTONE) 25 MG Tab  Take 0.5 Tabs by mouth every day.             Tiotropium  Bromide Monohydrate (SPIRIVA RESPIMAT) 2.5 MCG/ACT Aero Soln  Inhale 2 Inhalation by mouth every day. Assemble and prime.             torsemide (DEMADEX) 20 MG Tab  Take 2 Tabs by mouth every day.             zolpidem (AMBIEN) 10 MG Tab  TAKE ONE TABLET BY MOUTH AT BEDTIME, as needed prn insomnia                 DIET  No orders of the defined types were placed in this encounter.      ACTIVITY  As tolerated.  Weight bearing as tolerated      CONSULTATIONS  Cardiology  to    PROCEDURES  Echocardiogram shows ejection fraction of 15%    LABORATORY  Lab Results   Component Value Date/Time    SODIUM 138 11/10/2017 01:52 AM    POTASSIUM 4.1 11/10/2017 01:52 AM    CHLORIDE 101 11/10/2017 01:52 AM    CO2 29 11/10/2017 01:52 AM    GLUCOSE 104 (H) 11/10/2017 01:52 AM    BUN 24 (H) 11/10/2017 01:52 AM    CREATININE 0.73 11/10/2017 01:52 AM    CREATININE 0.7 03/25/2008 10:20 AM        Lab Results   Component Value Date/Time    WBC 6.0 11/07/2017 02:33 AM    HEMOGLOBIN 14.3 11/07/2017 02:33 AM    HEMATOCRIT 42.9 11/07/2017 02:33 AM    PLATELETCT 188 11/07/2017 02:33 AM        Total time of the discharge process exceeds 45 minutes

## 2017-11-13 ENCOUNTER — PATIENT OUTREACH (OUTPATIENT)
Dept: HEALTH INFORMATION MANAGEMENT | Facility: OTHER | Age: 67
End: 2017-11-13

## 2017-11-13 ENCOUNTER — HOSPITAL ENCOUNTER (OUTPATIENT)
Dept: RADIOLOGY | Facility: MEDICAL CENTER | Age: 67
End: 2017-11-13
Attending: FAMILY MEDICINE
Payer: MEDICARE

## 2017-11-13 DIAGNOSIS — Z12.31 VISIT FOR SCREENING MAMMOGRAM: ICD-10-CM

## 2017-11-13 DIAGNOSIS — Z91.89 POTENTIAL FOR DEFICIENT KNOWLEDGE OF CONGESTIVE HEART FAILURE: ICD-10-CM

## 2017-11-13 PROCEDURE — G0202 SCR MAMMO BI INCL CAD: HCPCS

## 2017-11-13 RX ORDER — SPIRONOLACTONE 25 MG/1
12.5 TABLET ORAL DAILY
Qty: 30 TAB | Refills: 3 | Status: SHIPPED | OUTPATIENT
Start: 2017-11-13 | End: 2017-11-30 | Stop reason: SDUPTHER

## 2017-11-16 ENCOUNTER — OFFICE VISIT (OUTPATIENT)
Dept: MEDICAL GROUP | Facility: CLINIC | Age: 67
End: 2017-11-16
Payer: MEDICARE

## 2017-11-16 ENCOUNTER — OFFICE VISIT (OUTPATIENT)
Dept: CARDIOLOGY | Facility: MEDICAL CENTER | Age: 67
End: 2017-11-16
Payer: MEDICARE

## 2017-11-16 ENCOUNTER — APPOINTMENT (OUTPATIENT)
Dept: PULMONOLOGY | Facility: HOSPICE | Age: 67
End: 2017-11-16
Payer: MEDICARE

## 2017-11-16 VITALS
BODY MASS INDEX: 28.34 KG/M2 | HEART RATE: 80 BPM | RESPIRATION RATE: 16 BRPM | WEIGHT: 154 LBS | TEMPERATURE: 96.7 F | HEIGHT: 62 IN | DIASTOLIC BLOOD PRESSURE: 56 MMHG | SYSTOLIC BLOOD PRESSURE: 104 MMHG | OXYGEN SATURATION: 92 %

## 2017-11-16 VITALS
BODY MASS INDEX: 27.97 KG/M2 | DIASTOLIC BLOOD PRESSURE: 62 MMHG | HEIGHT: 62 IN | OXYGEN SATURATION: 93 % | HEART RATE: 88 BPM | WEIGHT: 152 LBS | SYSTOLIC BLOOD PRESSURE: 96 MMHG

## 2017-11-16 DIAGNOSIS — I50.22 STAGE C CHRONIC SYSTOLIC CONGESTIVE HEART FAILURE (HCC): ICD-10-CM

## 2017-11-16 DIAGNOSIS — Z09 HOSPITAL DISCHARGE FOLLOW-UP: ICD-10-CM

## 2017-11-16 DIAGNOSIS — J98.4 RESTRICTIVE LUNG DISEASE: ICD-10-CM

## 2017-11-16 DIAGNOSIS — Z95.810 PRESENCE OF BIVENTRICULAR AICD: ICD-10-CM

## 2017-11-16 DIAGNOSIS — G47.33 OBSTRUCTIVE SLEEP APNEA: ICD-10-CM

## 2017-11-16 DIAGNOSIS — I50.40 COMBINED SYSTOLIC AND DIASTOLIC CONGESTIVE HEART FAILURE, UNSPECIFIED CONGESTIVE HEART FAILURE CHRONICITY: ICD-10-CM

## 2017-11-16 DIAGNOSIS — I50.43 ACUTE ON CHRONIC COMBINED SYSTOLIC AND DIASTOLIC CHF (CONGESTIVE HEART FAILURE) (HCC): ICD-10-CM

## 2017-11-16 DIAGNOSIS — I50.20 ACC/AHA STAGE C SYSTOLIC HEART FAILURE (HCC): ICD-10-CM

## 2017-11-16 DIAGNOSIS — I44.7 LBBB (LEFT BUNDLE BRANCH BLOCK): ICD-10-CM

## 2017-11-16 DIAGNOSIS — I50.23 NYHA CLASS 3 ACUTE ON CHRONIC SYSTOLIC HEART FAILURE (HCC): ICD-10-CM

## 2017-11-16 DIAGNOSIS — E11.42 TYPE 2 DIABETES MELLITUS WITH DIABETIC POLYNEUROPATHY, WITHOUT LONG-TERM CURRENT USE OF INSULIN (HCC): ICD-10-CM

## 2017-11-16 DIAGNOSIS — I42.0 DILATED CARDIOMYOPATHY (HCC): ICD-10-CM

## 2017-11-16 DIAGNOSIS — I10 ESSENTIAL HYPERTENSION: ICD-10-CM

## 2017-11-16 PROCEDURE — 99496 TRANSJ CARE MGMT HIGH F2F 7D: CPT | Performed by: NURSE PRACTITIONER

## 2017-11-16 PROCEDURE — 99214 OFFICE O/P EST MOD 30 MIN: CPT | Performed by: INTERNAL MEDICINE

## 2017-11-16 RX ORDER — TORSEMIDE 20 MG/1
40 TABLET ORAL DAILY
Qty: 60 TAB | Refills: 11 | Status: SHIPPED | OUTPATIENT
Start: 2017-11-16 | End: 2017-11-16 | Stop reason: SDUPTHER

## 2017-11-16 RX ORDER — TORSEMIDE 20 MG/1
40 TABLET ORAL DAILY
Qty: 60 TAB | Refills: 0 | Status: ON HOLD | OUTPATIENT
Start: 2017-11-16 | End: 2018-01-15

## 2017-11-16 RX ORDER — LOSARTAN POTASSIUM 50 MG/1
TABLET ORAL
COMMUNITY
Start: 2017-09-03 | End: 2017-11-16

## 2017-11-16 ASSESSMENT — NEW YORK HEART ASSOCIATION (NYHA) CLASSIFICATION: NYHA FUNCTIONAL CLASS: CLASS III

## 2017-11-16 ASSESSMENT — ENCOUNTER SYMPTOMS
EYES NEGATIVE: 1
WEAKNESS: 0
CLAUDICATION: 0
CONSTITUTIONAL NEGATIVE: 1
RESPIRATORY NEGATIVE: 1
MUSCULOSKELETAL NEGATIVE: 1
SORE THROAT: 0
CHILLS: 0
SHORTNESS OF BREATH: 0
CARDIOVASCULAR NEGATIVE: 1
NEUROLOGICAL NEGATIVE: 1
PALPITATIONS: 0
SPUTUM PRODUCTION: 0
GASTROINTESTINAL NEGATIVE: 1
COUGH: 0
BRUISES/BLEEDS EASILY: 0
LOSS OF CONSCIOUSNESS: 0
HEMOPTYSIS: 0
STRIDOR: 0
FEVER: 0
DIZZINESS: 0
WHEEZING: 0
ORTHOPNEA: 0
PND: 0

## 2017-11-16 ASSESSMENT — MINNESOTA LIVING WITH HEART FAILURE QUESTIONNAIRE (MLHF)
GIVING YOU SIDE EFFECTS FROM TREATMENTS: 2
TOTAL_SCORE: 55
WALKING ABOUT OR CLIMBING STAIRS DIFFICULT: 4
MAKING YOU SHORT OF BREATH: 4
DIFFICULTY SLEEPING WELL AT NIGHT: 2
MAKING YOU STAY IN A HOSPITAL: 5
FEELING LIKE A BURDEN TO FAMILY AND FRIENDS: 4
TIRED, FATIGUED OR LOW ON ENERGY: 4
DIFFICULTY WITH RECREATIONAL PASTIMES, SPORTS, HOBBIES: 3
MAKING YOU WORRY: 3
DIFFICULTY WORKING TO EARN A LIVING: 0
LOSS OF SELF CONTROL IN YOUR LIFE: 3
EATING LESS FOODS YOU LIKE: 0
WORKING AROUND THE HOUSE OR YARD DIFFICULT: 4
MAKING YOU FEEL DEPRESSED: 2
DIFFICULTY WITH SEXUAL ACTIVITIES: 0
COSTING YOU MONEY FOR MEDICAL CARE: 2
SWELLING IN ANKLES OR LEGS: 2
DIFFICULTY SOCIALIZING WITH FAMILY OR FRIENDS: 3
DIFFICULTY TO CONCENTRATE OR REMEMBERING THINGS: 2
DIFFICULTY GOING AWAY FROM HOME: 2
HAVING TO SIT OR LIE DOWN DURING THE DAY: 4

## 2017-11-16 ASSESSMENT — 6 MINUTE WALK TEST (6MWT): TOTAL DISTANCE WALKED (METERS): 271

## 2017-11-16 ASSESSMENT — PATIENT HEALTH QUESTIONNAIRE - PHQ9: CLINICAL INTERPRETATION OF PHQ2 SCORE: 0

## 2017-11-16 NOTE — PATIENT INSTRUCTIONS
If you need further assistance, or have any questions; concerns or lingering symptoms before seeing your Primary Care Provider or specialist.     Do not hesitate to contact us.     Please contact us at the Post-Hospital Follow Up Program at [phone].   Our offices hours are Monday-Friday 8 am-5 pm.

## 2017-11-16 NOTE — LETTER
[facility]   [address] [facility] [address]  [place] [address]  Phone: [phone]  Fax: [phone]              Isatu Dang  1950    Encounter Date: 11/16/2017    Gregorio Betancourt M.D.          PROGRESS NOTE:  Subjective:   Isatu Dang is a 67 y.o. female who presents today as a follow-up from her hospitalization from his stage C to stage D nonischemic cardiomyopathy. She was in the hospital and diuresed. Her blood pressure remains relatively low in the 80s to 90s. Her medications were switched around and she was started on torsemide. She is status post by the ICD. She is not having any chest pain. She has no lower extremity edema.    Past Medical History:   Diagnosis Date   • ACC/AHA stage C systolic heart failure (CMS-HCC) 11/8/2017   • Arrhythmia    • Bowel habit changes     Diarrhea.   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   • Depression    • Diabetes (CMS-[facility])    • Dilated cardiomyopathy    • Hypercholesteremia    • Hypertension 1998    Pt states bp runs low on current meds   • Indigestion    • Joint replacement         • LBBB (left bundle branch block)    • Lung cancer (CMS-[facility]) 2012        • Myocardial infarct 1998   • Neuropathy    • Obesity    • Pain 2011    Legs pain controled on meds   • Peripheral neuropathy (CMS-[facility])    • Renal cyst, acquired, left      History of benign cystic mass on the lower left kidney.   • Sleep apnea    • Unspecified hemorrhagic conditions     from aspirin   • Ventricular tachycardia (CMS-[facility])      Past Surgical History:   Procedure Laterality Date   • AICD BATTERY CHANGE  November 2016    Generator replacement with Medtronic Viva S CRT-D [id number] implanted by Dr. Alexis. Original device implanted in 1998   • LARYNGOSCOPY  11/14/2013       Performed by Judson Willis M.D. at SURGERY SAME DAY [facility] ORS   • BIOPSY GENERAL  11/14/2013    Performed by Judson Willis M.D. at SURGERY SAME DAY [facility]   • THORACOSCOPY ROBOTIC  8/27/2012    Performed by GANSER, JOHN H at SURGERY [facility]   • RECOVERY  7/23/2012    Performed by SURGERY, IR-RECOVERY at SURGERY SAME DAY [facility] OR[facility]   • BONE MARROW ASPIRATION  6/22/2012    Performed by ISAIAS EVERETT at ENDOSCOPY [facility]   • BONE MARROW BIOPSY, NDL/TROCAR  6/22/2012    Performed by ISAIAS EVERETT at ENDOSCOPY [facility] ORS   • NECK MASS EXCISION  5/24/2012    Performed by JUDSON WILLIS at SURGERY SAME DAY [facility]   • RECOVERY  10/21/2011    Performed by CHARLES, CATH-RECOVERY at SURGERY SAME DAY [facility] ORS   • CARPAL TUNNEL RELEASE  4/3/08    Performed by DEEP GREEN at SURGERY [facility]   • LIPOMA EXCISION  4/3/08    Performed by DEEP GREEN at SURGERY [facility]   • CARPAL TUNNEL RELEASE Right 05/07        • HYSTERECTOMY, TOTAL ABDOMINAL  1995   • HYSTERECTOMY, TOTAL ABDOMINAL          • IMPLANTABLE CARDIOVERTER DEFIBRILLATOR (ICD)  4/1/98, 03/2007, 10/2011   • TONSILLECTOMY AND ADENOIDECTOMY            Family History   Problem Relation Age of Onset   • Diabetes Mother    • Cancer Mother    • Diabetes Sister    • Diabetes Maternal Grandmother      History   Smoking Status   • Former Smoker   • Packs/day: 2.00   • Years: 30.00   • Types: Cigarettes   • Quit date: 10/20/1998   Smokeless Tobacco   • Never Used     Allergies   Allergen Reactions   • Tape Contact Dermatitis   • Ace Inhibitors Cough     cough     Outpatient Encounter Prescriptions as of 11/16/2017   Medication Sig Dispense Refill   • torsemide (DEMADEX) 20 MG Tab Take 2 Tabs by mouth every day. 60 Tab 0   • spironolactone (ALDACTONE) 25 MG Tab Take 0.5 Tabs by mouth every day. 30 Tab 3   • metformin (GLUCOPHAGE) 500 MG Tab Take 1 Tab by mouth 2 times a day, with meals. 60 Tab 2    • carvedilol (COREG) 6.25 MG Tab Take 1 Tab by mouth 2 times a day, with meals. 60 Tab 2   • sacubitril-valsartan (ENTRESTO) 24-26 MG Tab tablet Take 1 Tab by mouth 2 Times a Day. 180 Tab 3   • loperamide (IMODIUM) 2 MG Cap Take 2 mg by mouth 4 times a day as needed for Diarrhea.     • zolpidem (AMBIEN) 10 MG Tab TAKE ONE TABLET BY MOUTH AT BEDTIME, as needed prn insomnia 90 Tab 1   • albuterol (PROAIR HFA) 108 (90 BASE) MCG/ACT Aero Soln inhalation aerosol Inhale 2 Puffs by mouth every 6 hours as needed for Shortness of Breath. 3 Inhaler 3   • digoxin (LANOXIN) 125 MCG Tab Take 1 Tab by mouth every day. 90 Tab 3   • Tiotropium Bromide Monohydrate (SPIRIVA RESPIMAT) 2.5 MCG/ACT Aero Soln Inhale 2 Inhalation by mouth every day. Assemble and prime. 3 Inhaler 3   • Cyanocobalamin (VITAMIN B-12 SL) Place 1 Tab under tongue every day.     • aspirin (ASA) 325 MG TABS Take 325 mg by mouth every bedtime.     • Multiple Vitamin (MULTI-VITAMINS) TABS Take 1 Tab by mouth every day.     • Calcium Carbonate-Vitamin D (CALCIUM 600+D) 600-200 MG-UNIT TABS Take 2 Tabs by mouth every day.     • allopurinol (ZYLOPRIM) 300 MG TABS Take 300 mg by mouth every day.     • gabapentin (NEURONTIN) 300 MG CAPS Take 600 mg by mouth 3 times a day.     • losartan (COZAAR) 50 MG Tab      • [DISCONTINUED] torsemide (DEMADEX) 20 MG Tab Take 2 Tabs by mouth every day. 60 Tab 11   • [DISCONTINUED] torsemide (DEMADEX) 20 MG Tab Take 2 Tabs by mouth every day. 30 Tab 3   • citalopram (CELEXA) 20 MG Tab TAKE ONE TABLET BY MOUTH ONCE DAILY 90 Tab 3     No facility-administered encounter medications on file as of 11/16/2017.      Review of Systems   Constitutional: Negative.  Negative for chills, fever and malaise/fatigue.   HENT: Negative.  Negative for sore throat.    Eyes: Negative.    Respiratory: Negative.  Negative for cough, hemoptysis, sputum production, shortness of breath, wheezing and stridor.    Cardiovascular: Negative.  Negative for chest  "pain, palpitations, orthopnea, claudication, leg swelling and PND.   Gastrointestinal: Negative.    Genitourinary: Negative.    Musculoskeletal: Negative.    Skin: Negative.    Neurological: Negative.  Negative for dizziness, loss of consciousness and weakness.   Endo/Heme/Allergies: Negative.  Does not bruise/bleed easily.   All other systems reviewed and are negative.       Objective:   BP (!) 96/62   Pulse 88   Ht 1.575 m (5' 2\")   Wt 68.9 kg (152 lb)   LMP 11/05/1994   SpO2 93%   BMI 27.80 kg/m²      Physical Exam   Constitutional: She is oriented to person, place, and time. She appears well-developed and well-nourished. No distress.   HENT:   Head: Normocephalic.   Mouth/Throat: Oropharynx is clear and moist.   Eyes: EOM are normal. Pupils are equal, round, and reactive to light. Right eye exhibits no discharge. Left eye exhibits no discharge. No scleral icterus.   Neck: Normal range of motion. Neck supple. No JVD present. No tracheal deviation present.   Cardiovascular: Normal rate, regular rhythm, S1 normal, S2 normal, normal heart sounds, intact distal pulses and normal pulses.  Exam reveals no gallop, no S3, no S4 and no friction rub.    No murmur heard.   No systolic murmur is present    No diastolic murmur is present   Pulses:       Carotid pulses are 2+ on the right side, and 2+ on the left side.       Radial pulses are 2+ on the right side, and 2+ on the left side.        Dorsalis pedis pulses are 2+ on the right side, and 2+ on the left side.        Posterior tibial pulses are 2+ on the right side, and 2+ on the left side.   Pulmonary/Chest: Effort normal and breath sounds normal. No respiratory distress. She has no wheezes. She has no rales.   Abdominal: Soft. Bowel sounds are normal. She exhibits no distension and no mass. There is no tenderness. There is no rebound and no guarding.   Musculoskeletal: She exhibits no edema.   Neurological: She is alert and oriented to person, place, and time. " No cranial nerve deficit.   Skin: Skin is warm and dry. She is not diaphoretic. No pallor.   Psychiatric: She has a normal mood and affect. Her behavior is normal. Judgment and thought content normal.   Nursing note and vitals reviewed.      Assessment:     1. LBBB (left bundle branch block)     2. Essential hypertension     3. Dilated cardiomyopathy (CMS-HCC)     4. Combined systolic and diastolic congestive heart failure, unspecified congestive heart failure chronicity (CMS-HCC)     5. Acute on chronic combined systolic and diastolic CHF (congestive heart failure) (CMS-HCC)  torsemide (DEMADEX) 20 MG Tab    DISCONTINUED: torsemide (DEMADEX) 20 MG Tab   6. ACC/AHA stage C systolic heart failure (CMS-HCC)  torsemide (DEMADEX) 20 MG Tab    DISCONTINUED: torsemide (DEMADEX) 20 MG Tab   7. NYHA class 3 acute on chronic systolic heart failure (CMS-HCC)  torsemide (DEMADEX) 20 MG Tab    DISCONTINUED: torsemide (DEMADEX) 20 MG Tab   8. Obstructive sleep apnea     9. Presence of biventricular AICD     10. Stage C chronic systolic congestive heart failure (CMS-[facility])         Medical Decision Making:  Today's Assessment / Status / Plan:     89-year-old female with borderline stage D systolic heart failure with an EF drop from 30-15% in the setting of acute decompensation. She's now euvolemic. She will be referred for a PA pressure monitor. We will continue her medications today with no changes. I would like to see her back in approximate 4 weeks. I think that her decompensation EF could be secondary to acute decompensated state  And therefore we will wait a couple of weeks and recheck an echocardiogram to see what her function is at that point.    Thank for you allowing me to take part in your patient's care, please call should you have any questions or would like to discuss this patient.      Edwin Urias M.D.  [address]  [address]  [place] [address]  VIA Facsimile: [phone]

## 2017-11-16 NOTE — PROGRESS NOTES
Subjective:     Isatu Dang is a 67 y.o. female who presents for Hospital Follow-up.  Chart reviewed. Discharge summary available for review: Yes   Date of discharge 11/10/2017.  48- hour post discharge RN call completed on 11/13/2017 and documented in the medical record by Shanna Richards..    HPI: Recently hospitalized for sob, found to have acute on chronic heart failure with worsening LVEF from 35 to 15%. Switched from lasix to torsemide.     Acute on chronic combined systolic and diastolic CHF (congestive heart failure) (CMS-HCC)  Since returning home, patient reports feeling good. She has already seen cardiologist this morning. No medication change. She has no sign of fluid overload.     The patient denied weakness; no difficulty taking care of self at home.  Patient reports taking medications as instructed. Med profile updated. She is now on entresto so NO losartan (will be taken off from the list), on digoxin, coreg, torsemide and spirolactone.     Next cards follow up on 12/15, echo on 12/18. Pt reports that they are thinking about cardioMEMs and will follow up with cards for this.    Restrictive lung disease  Follow up with pulmonologist on 12/6.    Type 2 diabetes mellitus with diabetic polyneuropathy (CMS-HCC)  Controlled well per pt with metformin 500 mg bid. Last A1C in 2016. Pt follows up with PCP for this. Her BS in the hospital is controlled well.     Neuropathy controlled with gabapentin.     NO PCP appointment. Does not want appointment now. She will make on on her own.     Patient Active Problem List    Diagnosis Date Noted   • Acute on chronic combined systolic and diastolic CHF (congestive heart failure) (CMS-HCC) 11/05/2017     Priority: High   • Essential hypertension 01/31/2016     Priority: Medium   • Presence of biventricular AICD 12/20/2011     Priority: Medium   • Type 2 diabetes mellitus with diabetic polyneuropathy (CMS-HCC) 01/31/2016     Priority: Low   • Obstructive sleep apnea  "04/17/2013     Priority: Low   • ACC/AHA stage C systolic heart failure (CMS-HCC) 11/08/2017   • NYHA class 3 acute on chronic systolic heart failure (CMS-HCC) 11/08/2017   • Nocturnal hypoxemia 07/26/2016   • Restrictive lung disease 07/26/2016   • Aortic regurgitation 05/27/2016   • Lung cancer (CMS-HCC) 07/16/2013   • Insomnia 04/17/2013   • Lymphoma (CMS-HCC) 08/17/2012   • Mediastinal mass 08/17/2012   • Congestive heart failure (CHF) (CMS-HCC)    • Depression    • Type 2 diabetes mellitus without complication (CMS-HCC)    • Dilated cardiomyopathy (CMS-HCC)    • Hypercholesteremia    • LBBB (left bundle branch block)    • Peripheral neuropathy (CMS-HCC)    • Renal cyst, acquired, left    • Ventricular tachycardia (CMS-HCC)          Allergies:   Tape and Ace inhibitors    Social History:  Social History   Substance Use Topics   • Smoking status: Former Smoker     Packs/day: 2.00     Years: 30.00     Types: Cigarettes     Quit date: 10/20/1998   • Smokeless tobacco: Never Used   • Alcohol use 0.0 - 1.2 oz/week      Comment: occasional         ROS:  Review of Systems   Constitutional: Negative for chills, fever and malaise/fatigue.   HENT: Negative for hearing loss and tinnitus.    Eyes: Negative for blurred vision and pain.   Respiratory: Negative for cough, sputum production, shortness of breath and wheezing.    Cardiovascular: Negative for chest pain, palpitations and leg swelling.   Gastrointestinal: Negative for abdominal pain, heartburn, nausea and vomiting.   Genitourinary: Negative for dysuria, frequency and urgency.   Musculoskeletal: Negative for falls and myalgias.   Skin: Negative for rash.   Neurological: Negative for dizziness, focal weakness, weakness and headaches.   Psychiatric/Behavioral: Negative for depression. The patient is not nervous/anxious.         Objective:     Blood pressure 104/56, pulse 80, temperature 35.9 °C (96.7 °F), resp. rate 16, height 1.575 m (5' 2\"), weight 69.9 kg (154 lb), " last menstrual period 11/05/1994, SpO2 92 %.     Physical Exam:  Physical Exam   Constitutional: She is oriented to person, place, and time and well-developed, well-nourished, and in no distress.   HENT:   Head: Normocephalic and atraumatic.   Eyes: Conjunctivae are normal.   Neck: Neck supple. No JVD present. No thyromegaly present.   Cardiovascular: Normal rate and regular rhythm.    Pulmonary/Chest: Effort normal and breath sounds normal. No respiratory distress. She has no wheezes.   Abdominal: Soft. Bowel sounds are normal. She exhibits no distension. There is no tenderness.   Musculoskeletal: Normal range of motion. She exhibits no edema.   Neurological: She is alert and oriented to person, place, and time.   Skin: Skin is warm. No erythema.   Nursing note and vitals reviewed.        Assessment and Plan:     1. Hospital discharge follow-up  Hospitalization and results reviewed with patient. High risk conditions requiring teaching or care coordination were identified and addressed.The patient demonstrate understanding of admission and underlying conditions. The patient understands discharge instructions and when to seek medical attention. Medications reviewed including instructions regarding high risk medications, dosing and side effects.    The patient is able to safely adhere to ADL/IADL, treatment and medication regimen, self-manage of high-risk conditions? Yes   The patient requires physical therapy/home health/DME referral? No   The patient requires referral to care coordination/behavioral health/social work?  No   Patient requires referral for pharmacy consult? No   Advance directive/POLST on file?  No   Required counseled on advance directive?  Yes info packet is provided.     NO PCP appointment. Does not want appointment now. She will make on on her own.     2. Acute on chronic combined systolic and diastolic CHF (congestive heart failure) (CMS-HCC)  Continue current meds, no change, pt just saw cards  this morning. No new symptoms after discharge. Fluid status is adequate.     3. Restrictive lung disease  Follow up with pulmonologist on 12/6.    4. Type 2 diabetes mellitus with diabetic polyneuropathy, without long-term current use of insulin (CMS-HCC)  Stable on metformin.       Medication Reconciliation  Medication list at end of encounter:   Current Outpatient Prescriptions   Medication Sig Dispense Refill   • torsemide (DEMADEX) 20 MG Tab Take 2 Tabs by mouth every day. 60 Tab 0   • spironolactone (ALDACTONE) 25 MG Tab Take 0.5 Tabs by mouth every day. 30 Tab 3   • metformin (GLUCOPHAGE) 500 MG Tab Take 1 Tab by mouth 2 times a day, with meals. 60 Tab 2   • carvedilol (COREG) 6.25 MG Tab Take 1 Tab by mouth 2 times a day, with meals. 60 Tab 2   • sacubitril-valsartan (ENTRESTO) 24-26 MG Tab tablet Take 1 Tab by mouth 2 Times a Day. 180 Tab 3   • zolpidem (AMBIEN) 10 MG Tab TAKE ONE TABLET BY MOUTH AT BEDTIME, as needed prn insomnia 90 Tab 1   • albuterol (PROAIR HFA) 108 (90 BASE) MCG/ACT Aero Soln inhalation aerosol Inhale 2 Puffs by mouth every 6 hours as needed for Shortness of Breath. 3 Inhaler 3   • digoxin (LANOXIN) 125 MCG Tab Take 1 Tab by mouth every day. 90 Tab 3   • Tiotropium Bromide Monohydrate (SPIRIVA RESPIMAT) 2.5 MCG/ACT Aero Soln Inhale 2 Inhalation by mouth every day. Assemble and prime. 3 Inhaler 3   • Cyanocobalamin (VITAMIN B-12 SL) Place 1 Tab under tongue every day.     • aspirin (ASA) 325 MG TABS Take 325 mg by mouth every bedtime.     • Multiple Vitamin (MULTI-VITAMINS) TABS Take 1 Tab by mouth every day.     • Calcium Carbonate-Vitamin D (CALCIUM 600+D) 600-200 MG-UNIT TABS Take 2 Tabs by mouth every day.     • allopurinol (ZYLOPRIM) 300 MG TABS Take 300 mg by mouth every day.     • gabapentin (NEURONTIN) 300 MG CAPS Take 600 mg by mouth 3 times a day.       No current facility-administered medications for this visit.        Primary care follow-up:  New health conditions identified  during hospitalization? Yes   Labs/pathology/imaging requires future PCP follow-up?  No   Changes to medications during hospitalization or today? Yes during hospitalization    Recommended followup: No Follow-up on file. with Edwin Urias M.D.   Future Appointments       Provider Department [place]    12/4/2017 8:30 AM [facility] [place] [facility] [place]     12/6/2017 9:40 AM JEAN CLAUDE [facility] Pulmonary Medicine     12/15/2017 9:00 AM Gregorio Betancourt M.D. [facility]     12/18/2017 10:15 AM [facility] EXAM 12; [place] [facility]    1/10/2018 10:15 AM PACER CHECK-[facility] 2 [facility]     1/10/2018 10:40 AM Felix Alexis M.D. [facility]     1/10/2018 11:30 AM Gregorio Betancourt M.D. [facility]           Patient Instruction  Patient offered educational material on discharge diagnosis and management of symptoms/red flags. Patient instructed to keep follow-up appointments and to bring written questions and and actual medications to each office visit. Patient instructed to call PCP/specialist with any problems/questions/concerns. Patient verbalizes understanding and has no further questions at this time.    Face-to-face transitional care management services with moderate complexity medical decision making.

## 2017-11-16 NOTE — PROGRESS NOTES
Subjective:   Isatu Dang is a 67 y.o. female who presents today as a follow-up from her hospitalization from his stage C to stage D nonischemic cardiomyopathy. She was in the hospital and diuresed. Her blood pressure remains relatively low in the 80s to 90s. Her medications were switched around and she was started on torsemide. She is status post by the ICD. She is not having any chest pain. She has no lower extremity edema.    Past Medical History:   Diagnosis Date   • ACC/AHA stage C systolic heart failure (CMS-[facility]) 11/8/2017   • Arrhythmia    • Bowel habit changes     Diarrhea.   • Cancer (CMS-[facility]) 2012    Lymphoma to neck   • Cardiac arrest (CMS-[facility]) 1998        • CATARACT     Beginning stages no surgery yet   • Congestive heart failure (CHF) (CMS-[facility])    • Dental disorder     Upper/Lower dentures.   • Depression    • Diabetes (CMS-[facility])    • Dilated cardiomyopathy    • Hypercholesteremia    • Hypertension 1998    Pt states bp runs low on current meds   • Indigestion    • Joint replacement         • LBBB (left bundle branch block)    • Lung cancer (CMS-[facility]) 2012        • Myocardial infarct 1998   • Neuropathy    • Obesity    • Pain 2011    Legs pain controled on meds   • Peripheral neuropathy (CMS-[facility])    • Renal cyst, acquired, left      History of benign cystic mass on the lower left kidney.   • Sleep apnea    • Unspecified hemorrhagic conditions     from aspirin   • Ventricular tachycardia (CMS-[facility])      Past Surgical History:   Procedure Laterality Date   • AICD BATTERY CHANGE  November 2016    Generator replacement with Medtronic Viva S CRT-D [id number] implanted by Dr. Alexis. Original device implanted in 1998   • LARYNGOSCOPY  11/14/2013    Performed by Judson Willis M.D. at SURGERY SAME DAY [facility] ORS   • BIOPSY GENERAL  11/14/2013    Performed by Judson Willis M.D. at SURGERY SAME DAY [facility] ORS   • THORACOSCOPY ROBOTIC  8/27/2012    Performed by GANSER, JOHN H at SURGERY [facility]   •  RECOVERY  7/23/2012    Performed by SURGERY, IR-RECOVERY at SURGERY SAME DAY [facility] ORS   • BONE MARROW ASPIRATION  6/22/2012    Performed by ISAIAS EVERETT at ENDOSCOPY [facility] ORS   • BONE MARROW BIOPSY, NDL/TROCAR  6/22/2012    Performed by ISAIAS EVERETT at ENDOSCOPY [facility] ORS   • NECK MASS EXCISION  5/24/2012    Performed by LAURA RAMIRES at SURGERY SAME DAY [facility] ORS   • RECOVERY  10/21/2011    Performed by SURGERY, CATH-RECOVERY at SURGERY SAME DAY [facility] ORS   • CARPAL TUNNEL RELEASE  4/3/08    Performed by DEEP GREEN at SURGERY [facility] ORS   • LIPOMA EXCISION  4/3/08    Performed by DEEP GREEN at SURGERY [facility] ORS   • CARPAL TUNNEL RELEASE Right 05/07        • HYSTERECTOMY, TOTAL ABDOMINAL  1995   • HYSTERECTOMY, TOTAL ABDOMINAL          • IMPLANTABLE CARDIOVERTER DEFIBRILLATOR (ICD)  4/1/98, 03/2007, 10/2011   • TONSILLECTOMY AND ADENOIDECTOMY            Family History   Problem Relation Age of Onset   • Diabetes Mother    • Cancer Mother    • Diabetes Sister    • Diabetes Maternal Grandmother      History   Smoking Status   • Former Smoker   • Packs/day: 2.00   • Years: 30.00   • Types: Cigarettes   • Quit date: 10/20/1998   Smokeless Tobacco   • Never Used     Allergies   Allergen Reactions   • Tape Contact Dermatitis   • Ace Inhibitors Cough     cough     Outpatient Encounter Prescriptions as of 11/16/2017   Medication Sig Dispense Refill   • torsemide (DEMADEX) 20 MG Tab Take 2 Tabs by mouth every day. 60 Tab 0   • spironolactone (ALDACTONE) 25 MG Tab Take 0.5 Tabs by mouth every day. 30 Tab 3   • metformin (GLUCOPHAGE) 500 MG Tab Take 1 Tab by mouth 2 times a day, with meals. 60 Tab 2   • carvedilol (COREG) 6.25 MG Tab Take 1 Tab by mouth 2 times a day, with meals. 60 Tab 2   • sacubitril-valsartan (ENTRESTO) 24-26 MG Tab tablet Take 1 Tab by mouth 2 Times a Day. 180 Tab 3   • loperamide (IMODIUM) 2 MG Cap Take 2 mg by mouth 4 times a day as needed  for Diarrhea.     • zolpidem (AMBIEN) 10 MG Tab TAKE ONE TABLET BY MOUTH AT BEDTIME, as needed prn insomnia 90 Tab 1   • albuterol (PROAIR HFA) 108 (90 BASE) MCG/ACT Aero Soln inhalation aerosol Inhale 2 Puffs by mouth every 6 hours as needed for Shortness of Breath. 3 Inhaler 3   • digoxin (LANOXIN) 125 MCG Tab Take 1 Tab by mouth every day. 90 Tab 3   • Tiotropium Bromide Monohydrate (SPIRIVA RESPIMAT) 2.5 MCG/ACT Aero Soln Inhale 2 Inhalation by mouth every day. Assemble and prime. 3 Inhaler 3   • Cyanocobalamin (VITAMIN B-12 SL) Place 1 Tab under tongue every day.     • aspirin (ASA) 325 MG TABS Take 325 mg by mouth every bedtime.     • Multiple Vitamin (MULTI-VITAMINS) TABS Take 1 Tab by mouth every day.     • Calcium Carbonate-Vitamin D (CALCIUM 600+D) 600-200 MG-UNIT TABS Take 2 Tabs by mouth every day.     • allopurinol (ZYLOPRIM) 300 MG TABS Take 300 mg by mouth every day.     • gabapentin (NEURONTIN) 300 MG CAPS Take 600 mg by mouth 3 times a day.     • losartan (COZAAR) 50 MG Tab      • [DISCONTINUED] torsemide (DEMADEX) 20 MG Tab Take 2 Tabs by mouth every day. 60 Tab 11   • [DISCONTINUED] torsemide (DEMADEX) 20 MG Tab Take 2 Tabs by mouth every day. 30 Tab 3   • citalopram (CELEXA) 20 MG Tab TAKE ONE TABLET BY MOUTH ONCE DAILY 90 Tab 3     No facility-administered encounter medications on file as of 11/16/2017.      Review of Systems   Constitutional: Negative.  Negative for chills, fever and malaise/fatigue.   HENT: Negative.  Negative for sore throat.    Eyes: Negative.    Respiratory: Negative.  Negative for cough, hemoptysis, sputum production, shortness of breath, wheezing and stridor.    Cardiovascular: Negative.  Negative for chest pain, palpitations, orthopnea, claudication, leg swelling and PND.   Gastrointestinal: Negative.    Genitourinary: Negative.    Musculoskeletal: Negative.    Skin: Negative.    Neurological: Negative.  Negative for dizziness, loss of consciousness and weakness.  "  Endo/Heme/Allergies: Negative.  Does not bruise/bleed easily.   All other systems reviewed and are negative.       Objective:   BP (!) 96/62   Pulse 88   Ht 1.575 m (5' 2\")   Wt 68.9 kg (152 lb)   LMP 11/05/1994   SpO2 93%   BMI 27.80 kg/m²     Physical Exam   Constitutional: She is oriented to person, place, and time. She appears well-developed and well-nourished. No distress.   HENT:   Head: Normocephalic.   Mouth/Throat: Oropharynx is clear and moist.   Eyes: EOM are normal. Pupils are equal, round, and reactive to light. Right eye exhibits no discharge. Left eye exhibits no discharge. No scleral icterus.   Neck: Normal range of motion. Neck supple. No JVD present. No tracheal deviation present.   Cardiovascular: Normal rate, regular rhythm, S1 normal, S2 normal, normal heart sounds, intact distal pulses and normal pulses.  Exam reveals no gallop, no S3, no S4 and no friction rub.    No murmur heard.   No systolic murmur is present    No diastolic murmur is present   Pulses:       Carotid pulses are 2+ on the right side, and 2+ on the left side.       Radial pulses are 2+ on the right side, and 2+ on the left side.        Dorsalis pedis pulses are 2+ on the right side, and 2+ on the left side.        Posterior tibial pulses are 2+ on the right side, and 2+ on the left side.   Pulmonary/Chest: Effort normal and breath sounds normal. No respiratory distress. She has no wheezes. She has no rales.   Abdominal: Soft. Bowel sounds are normal. She exhibits no distension and no mass. There is no tenderness. There is no rebound and no guarding.   Musculoskeletal: She exhibits no edema.   Neurological: She is alert and oriented to person, place, and time. No cranial nerve deficit.   Skin: Skin is warm and dry. She is not diaphoretic. No pallor.   Psychiatric: She has a normal mood and affect. Her behavior is normal. Judgment and thought content normal.   Nursing note and vitals reviewed.      Assessment:     1. " LBBB (left bundle branch block)     2. Essential hypertension     3. Dilated cardiomyopathy (CMS-HCC)     4. Combined systolic and diastolic congestive heart failure, unspecified congestive heart failure chronicity (CMS-HCC)     5. Acute on chronic combined systolic and diastolic CHF (congestive heart failure) (CMS-HCC)  torsemide (DEMADEX) 20 MG Tab    DISCONTINUED: torsemide (DEMADEX) 20 MG Tab   6. ACC/AHA stage C systolic heart failure (CMS-HCC)  torsemide (DEMADEX) 20 MG Tab    DISCONTINUED: torsemide (DEMADEX) 20 MG Tab   7. NYHA class 3 acute on chronic systolic heart failure (CMS-HCC)  torsemide (DEMADEX) 20 MG Tab    DISCONTINUED: torsemide (DEMADEX) 20 MG Tab   8. Obstructive sleep apnea     9. Presence of biventricular AICD     10. Stage C chronic systolic congestive heart failure (CMS-[facility])         Medical Decision Making:  Today's Assessment / Status / Plan:     89-year-old female with borderline stage D systolic heart failure with an EF drop from 30-15% in the setting of acute decompensation. She's now euvolemic. She will be referred for a PA pressure monitor. We will continue her medications today with no changes. I would like to see her back in approximate 4 weeks. I think that her decompensation EF could be secondary to acute decompensated state  And therefore we will wait a couple of weeks and recheck an echocardiogram to see what her function is at that point.    Thank for you allowing me to take part in your patient's care, please call should you have any questions or would like to discuss this patient.

## 2017-11-16 NOTE — PROGRESS NOTES
POST DISCHARGE CALL MADE BY Shanna Richards.  Discharge Date:11/10/2017   Date of Outreach Call: 11/13/2017  2:19 PM  Now that you're home, how are you doing? Fair  Do you have questions about your medications? No    Did you fill your medications? No  Comment:Working w/hospitalist to get med sent out    Do you have a follow-up appointment scheduled?Yes    Discharging Department: Telemetry 7    Number of Attempts: 1  Current or previous attempts completed within two business days of discharge? Yes  Provided education regarding treatment plan, medication, self-management, ADLs? Yes  Comment:Encouraged patient to follow low sodium diet,  weight daily.  Encouarge patient to notify cardiology if  weight gain of 3 pounds in a day, 5 pounds in a week.  Referral to Complex care management.   Has patient completed Advance Directive? If yes, advise them to bring to appointment. No  Care Manager phone number provided? Yes  Is there anything else I can help you with? Yes  Comment:getting Pt RX resources

## 2017-11-17 ASSESSMENT — ENCOUNTER SYMPTOMS
ABDOMINAL PAIN: 0
SPUTUM PRODUCTION: 0
WEAKNESS: 0
WHEEZING: 0
COUGH: 0
FEVER: 0
PALPITATIONS: 0
BLURRED VISION: 0
DIZZINESS: 0
NERVOUS/ANXIOUS: 0
HEARTBURN: 0
SHORTNESS OF BREATH: 0
CHILLS: 0
FOCAL WEAKNESS: 0
VOMITING: 0
EYE PAIN: 0
MYALGIAS: 0
NAUSEA: 0
DEPRESSION: 0
FALLS: 0
HEADACHES: 0

## 2017-11-17 NOTE — ASSESSMENT & PLAN NOTE
Since returning home, patient reports feeling good. She has already seen cardiologist this morning. No medication change. She has no sign of fluid overload.     The patient denied weakness; no difficulty taking care of self at home.  Patient reports taking medications as instructed. Med profile updated. She is now on entresto so NO losartan (will be taken off from the list), on digoxin, coreg, torsemide and spirolactone.     Next cards follow up on 12/15, echo on 12/18. Pt reports that they are thinking about cardioMEMs and will follow up with cards for this.

## 2017-11-17 NOTE — ASSESSMENT & PLAN NOTE
Controlled well per pt with metformin 500 mg bid. Last A1C in 2016. Pt follows up with PCP for this. Her BS in the hospital is controlled well.     Neuropathy controlled with gabapentin.

## 2017-11-30 DIAGNOSIS — I47.20 VT (VENTRICULAR TACHYCARDIA) (HCC): ICD-10-CM

## 2017-11-30 RX ORDER — DIGOXIN 125 MCG
125 TABLET ORAL DAILY
Qty: 90 TAB | Refills: 3 | Status: SHIPPED | OUTPATIENT
Start: 2017-11-30 | End: 2018-01-07

## 2017-11-30 RX ORDER — SPIRONOLACTONE 25 MG/1
12.5 TABLET ORAL DAILY
Qty: 45 TAB | Refills: 3 | Status: ON HOLD | OUTPATIENT
Start: 2017-11-30 | End: 2018-01-15

## 2017-11-30 RX ORDER — CARVEDILOL 6.25 MG/1
6.25 TABLET ORAL 2 TIMES DAILY WITH MEALS
Qty: 180 TAB | Refills: 3 | Status: ON HOLD | OUTPATIENT
Start: 2017-11-30 | End: 2018-01-15

## 2017-12-01 ENCOUNTER — TELEPHONE (OUTPATIENT)
Dept: PULMONOLOGY | Facility: HOSPICE | Age: 67
End: 2017-12-01

## 2017-12-01 ENCOUNTER — TELEPHONE (OUTPATIENT)
Dept: CARDIOLOGY | Facility: MEDICAL CENTER | Age: 67
End: 2017-12-01

## 2017-12-01 NOTE — TELEPHONE ENCOUNTER
[employer] oxygen certification documents completed. Copy placed in media. Originals left at  in [place] for patient pick-up on 12/15 at time of next appointment. Jessa made aware.

## 2017-12-01 NOTE — TELEPHONE ENCOUNTER
Records requested from Dr. Rodríguez's office at [facility].   P: [phone]  F: [phone]

## 2017-12-04 ENCOUNTER — HOSPITAL ENCOUNTER (OUTPATIENT)
Dept: LAB | Facility: MEDICAL CENTER | Age: 67
End: 2017-12-04
Attending: INTERNAL MEDICINE
Payer: MEDICARE

## 2017-12-04 ENCOUNTER — HOSPITAL ENCOUNTER (OUTPATIENT)
Dept: LAB | Facility: MEDICAL CENTER | Age: 67
End: 2017-12-04
Attending: NURSE PRACTITIONER
Payer: MEDICARE

## 2017-12-04 LAB
ALBUMIN SERPL BCP-MCNC: 3.9 G/DL (ref 3.2–4.9)
ALBUMIN SERPL BCP-MCNC: 4.1 G/DL (ref 3.2–4.9)
ALBUMIN/GLOB SERPL: 1.1 G/DL
ALBUMIN/GLOB SERPL: 1.4 G/DL
ALP SERPL-CCNC: 57 U/L (ref 30–99)
ALP SERPL-CCNC: 58 U/L (ref 30–99)
ALT SERPL-CCNC: 13 U/L (ref 2–50)
ALT SERPL-CCNC: 13 U/L (ref 2–50)
ANION GAP SERPL CALC-SCNC: 6 MMOL/L (ref 0–11.9)
ANION GAP SERPL CALC-SCNC: 8 MMOL/L (ref 0–11.9)
AST SERPL-CCNC: 29 U/L (ref 12–45)
AST SERPL-CCNC: 31 U/L (ref 12–45)
BASOPHILS # BLD AUTO: 0.9 % (ref 0–1.8)
BASOPHILS # BLD AUTO: 0.9 % (ref 0–1.8)
BASOPHILS # BLD: 0.06 K/UL (ref 0–0.12)
BASOPHILS # BLD: 0.06 K/UL (ref 0–0.12)
BILIRUB SERPL-MCNC: 0.7 MG/DL (ref 0.1–1.5)
BILIRUB SERPL-MCNC: 0.7 MG/DL (ref 0.1–1.5)
BUN SERPL-MCNC: 22 MG/DL (ref 8–22)
BUN SERPL-MCNC: 24 MG/DL (ref 8–22)
CALCIUM SERPL-MCNC: 10.3 MG/DL (ref 8.5–10.5)
CALCIUM SERPL-MCNC: 10.3 MG/DL (ref 8.5–10.5)
CHLORIDE SERPL-SCNC: 101 MMOL/L (ref 96–112)
CHLORIDE SERPL-SCNC: 98 MMOL/L (ref 96–112)
CHOLEST SERPL-MCNC: 152 MG/DL (ref 100–199)
CO2 SERPL-SCNC: 31 MMOL/L (ref 20–33)
CO2 SERPL-SCNC: 32 MMOL/L (ref 20–33)
CREAT SERPL-MCNC: 0.75 MG/DL (ref 0.5–1.4)
CREAT SERPL-MCNC: 0.87 MG/DL (ref 0.5–1.4)
EOSINOPHIL # BLD AUTO: 0.36 K/UL (ref 0–0.51)
EOSINOPHIL # BLD AUTO: 0.39 K/UL (ref 0–0.51)
EOSINOPHIL NFR BLD: 5.2 % (ref 0–6.9)
EOSINOPHIL NFR BLD: 6 % (ref 0–6.9)
ERYTHROCYTE [DISTWIDTH] IN BLOOD BY AUTOMATED COUNT: 45.5 FL (ref 35.9–50)
ERYTHROCYTE [DISTWIDTH] IN BLOOD BY AUTOMATED COUNT: 46.5 FL (ref 35.9–50)
EST. AVERAGE GLUCOSE BLD GHB EST-MCNC: 114 MG/DL
GFR SERPL CREATININE-BSD FRML MDRD: >60 ML/MIN/1.73 M 2
GFR SERPL CREATININE-BSD FRML MDRD: >60 ML/MIN/1.73 M 2
GLOBULIN SER CALC-MCNC: 3 G/DL (ref 1.9–3.5)
GLOBULIN SER CALC-MCNC: 3.4 G/DL (ref 1.9–3.5)
GLUCOSE SERPL-MCNC: 112 MG/DL (ref 65–99)
GLUCOSE SERPL-MCNC: 99 MG/DL (ref 65–99)
HBA1C MFR BLD: 5.6 % (ref 0–5.6)
HCT VFR BLD AUTO: 46.4 % (ref 37–47)
HCT VFR BLD AUTO: 46.8 % (ref 37–47)
HCV AB SER QL: NEGATIVE
HDLC SERPL-MCNC: 45 MG/DL
HGB BLD-MCNC: 15.8 G/DL (ref 12–16)
HGB BLD-MCNC: 15.8 G/DL (ref 12–16)
IMM GRANULOCYTES # BLD AUTO: 0.01 K/UL (ref 0–0.11)
IMM GRANULOCYTES # BLD AUTO: 0.02 K/UL (ref 0–0.11)
IMM GRANULOCYTES NFR BLD AUTO: 0.2 % (ref 0–0.9)
IMM GRANULOCYTES NFR BLD AUTO: 0.3 % (ref 0–0.9)
LDH SERPL-CCNC: 178 U/L (ref 107–266)
LDLC SERPL CALC-MCNC: 87 MG/DL
LYMPHOCYTES # BLD AUTO: 1.37 K/UL (ref 1–4.8)
LYMPHOCYTES # BLD AUTO: 1.5 K/UL (ref 1–4.8)
LYMPHOCYTES NFR BLD: 21 % (ref 22–41)
LYMPHOCYTES NFR BLD: 21.8 % (ref 22–41)
MCH RBC QN AUTO: 31 PG (ref 27–33)
MCH RBC QN AUTO: 31 PG (ref 27–33)
MCHC RBC AUTO-ENTMCNC: 33.8 G/DL (ref 33.6–35)
MCHC RBC AUTO-ENTMCNC: 34.1 G/DL (ref 33.6–35)
MCV RBC AUTO: 91 FL (ref 81.4–97.8)
MCV RBC AUTO: 91.8 FL (ref 81.4–97.8)
MONOCYTES # BLD AUTO: 0.47 K/UL (ref 0–0.85)
MONOCYTES # BLD AUTO: 0.47 K/UL (ref 0–0.85)
MONOCYTES NFR BLD AUTO: 6.8 % (ref 0–13.4)
MONOCYTES NFR BLD AUTO: 7.2 % (ref 0–13.4)
NEUTROPHILS # BLD AUTO: 4.22 K/UL (ref 2–7.15)
NEUTROPHILS # BLD AUTO: 4.48 K/UL (ref 2–7.15)
NEUTROPHILS NFR BLD: 64.7 % (ref 44–72)
NEUTROPHILS NFR BLD: 65 % (ref 44–72)
NRBC # BLD AUTO: 0 K/UL
NRBC # BLD AUTO: 0 K/UL
NRBC BLD AUTO-RTO: 0 /100 WBC
NRBC BLD AUTO-RTO: 0 /100 WBC
PLATELET # BLD AUTO: 215 K/UL (ref 164–446)
PLATELET # BLD AUTO: 219 K/UL (ref 164–446)
PMV BLD AUTO: 11.9 FL (ref 9–12.9)
PMV BLD AUTO: 11.9 FL (ref 9–12.9)
POTASSIUM SERPL-SCNC: 4.6 MMOL/L (ref 3.6–5.5)
POTASSIUM SERPL-SCNC: 4.8 MMOL/L (ref 3.6–5.5)
PROT SERPL-MCNC: 7.1 G/DL (ref 6–8.2)
PROT SERPL-MCNC: 7.3 G/DL (ref 6–8.2)
RBC # BLD AUTO: 5.1 M/UL (ref 4.2–5.4)
RBC # BLD AUTO: 5.1 M/UL (ref 4.2–5.4)
SODIUM SERPL-SCNC: 136 MMOL/L (ref 135–145)
SODIUM SERPL-SCNC: 140 MMOL/L (ref 135–145)
T4 FREE SERPL-MCNC: 0.87 NG/DL (ref 0.53–1.43)
TRIGL SERPL-MCNC: 101 MG/DL (ref 0–149)
TSH SERPL DL<=0.005 MIU/L-ACNC: 1.86 UIU/ML (ref 0.3–3.7)
URATE SERPL-MCNC: 4.1 MG/DL (ref 1.9–8.2)
WBC # BLD AUTO: 6.5 K/UL (ref 4.8–10.8)
WBC # BLD AUTO: 6.9 K/UL (ref 4.8–10.8)

## 2017-12-04 PROCEDURE — 85025 COMPLETE CBC W/AUTO DIFF WBC: CPT

## 2017-12-04 PROCEDURE — 80053 COMPREHEN METABOLIC PANEL: CPT

## 2017-12-04 PROCEDURE — 84439 ASSAY OF FREE THYROXINE: CPT

## 2017-12-04 PROCEDURE — 80053 COMPREHEN METABOLIC PANEL: CPT | Mod: 91

## 2017-12-04 PROCEDURE — 80061 LIPID PANEL: CPT

## 2017-12-04 PROCEDURE — 83615 LACTATE (LD) (LDH) ENZYME: CPT

## 2017-12-04 PROCEDURE — 85025 COMPLETE CBC W/AUTO DIFF WBC: CPT | Mod: 91

## 2017-12-04 PROCEDURE — 84443 ASSAY THYROID STIM HORMONE: CPT

## 2017-12-04 PROCEDURE — 83036 HEMOGLOBIN GLYCOSYLATED A1C: CPT | Mod: GA

## 2017-12-04 PROCEDURE — 86803 HEPATITIS C AB TEST: CPT

## 2017-12-04 PROCEDURE — 36415 COLL VENOUS BLD VENIPUNCTURE: CPT

## 2017-12-04 PROCEDURE — 84550 ASSAY OF BLOOD/URIC ACID: CPT

## 2017-12-06 ENCOUNTER — OFFICE VISIT (OUTPATIENT)
Dept: PULMONOLOGY | Facility: HOSPICE | Age: 67
End: 2017-12-06
Payer: MEDICARE

## 2017-12-06 VITALS
BODY MASS INDEX: 28.45 KG/M2 | RESPIRATION RATE: 16 BRPM | OXYGEN SATURATION: 93 % | DIASTOLIC BLOOD PRESSURE: 62 MMHG | HEART RATE: 103 BPM | WEIGHT: 154.6 LBS | SYSTOLIC BLOOD PRESSURE: 126 MMHG | HEIGHT: 62 IN

## 2017-12-06 DIAGNOSIS — J98.4 RESTRICTIVE LUNG DISEASE: ICD-10-CM

## 2017-12-06 DIAGNOSIS — J98.59 MEDIASTINAL MASS: ICD-10-CM

## 2017-12-06 DIAGNOSIS — R09.02 HYPOXEMIA: ICD-10-CM

## 2017-12-06 DIAGNOSIS — J90 PLEURAL EFFUSION: ICD-10-CM

## 2017-12-06 DIAGNOSIS — G47.00 INSOMNIA, UNSPECIFIED TYPE: ICD-10-CM

## 2017-12-06 DIAGNOSIS — I27.20 PULMONARY HYPERTENSION (HCC): ICD-10-CM

## 2017-12-06 DIAGNOSIS — G47.33 OBSTRUCTIVE SLEEP APNEA: ICD-10-CM

## 2017-12-06 DIAGNOSIS — I50.40 COMBINED SYSTOLIC AND DIASTOLIC CONGESTIVE HEART FAILURE, UNSPECIFIED CONGESTIVE HEART FAILURE CHRONICITY: ICD-10-CM

## 2017-12-06 DIAGNOSIS — C82.80 OTHER TYPE OF FOLLICULAR LYMPHOMA, UNSPECIFIED BODY REGION (HCC): ICD-10-CM

## 2017-12-06 DIAGNOSIS — Z85.118 H/O: LUNG CANCER: ICD-10-CM

## 2017-12-06 PROCEDURE — 99214 OFFICE O/P EST MOD 30 MIN: CPT | Performed by: NURSE PRACTITIONER

## 2017-12-06 RX ORDER — ZOLPIDEM TARTRATE 10 MG/1
TABLET ORAL
Qty: 90 TAB | Refills: 1 | Status: ON HOLD | OUTPATIENT
Start: 2017-12-06 | End: 2018-01-15

## 2017-12-06 NOTE — TELEPHONE ENCOUNTER
Have we ever prescribed this med? Yes.  If yes, what date? 7/13/17    Last OV: 12/6/17- Caitlin Purcell    Next OV: 3/7/18- Caitlin Purcell    DX: Copd    Medications: Zolpidem

## 2017-12-06 NOTE — PATIENT INSTRUCTIONS
Plan:    1) She is feeling better overall with her new diuretic. She is scheduled to follow with Cardiology next week. Restrictive changes on PFT's are felt to be related to her CHF. Recent chest xray indicated bilateral effusions. She is not hypoxic and this time. She states the Cardiologist had discussed an implantable device to monitor her CHF. She would like to wait to have this until January as she is planning a trip to [facility] for her 48th wedding anniversary. We will arrange 3 month follow up with repeat chest xray and PFT's. Encouraged sooner for worsening symptoms.  2) Continue CPAP at 11 CM H20 with 2 LPM 02 bleed in. Chip indicates adequate treatment with excellent compliance and recent oximetry indicated adequate 02 saturations.   3) Sleep hygiene discussed. Continue prn Ambien.  4) Continue Spiriva and Albuterol inhalers.  5) She is up to date on Pneumovax 23, Prevnar 13 and Influenza vaccinations.  6) Continue to follow with Oncology.  7) 3 month follow up with Cardiology and Oncology consult records with updated PFT's and chest xray, sooner OV if needed.

## 2017-12-06 NOTE — PROGRESS NOTES
Chief Complaint   Patient presents with   • Apnea   • Hypoxemia       HPI:  Isatu Dang is a 67 y.o. year old female here today for follow-up on her hypoxemia   She has a history of mild ISMAEL. She is compliant on CPAP at 11 CM H20 with 2 LPM 02 bleed in. Her compliance card download today in the office indicates an AHI of 2.2 with an average use of 7.5 hours at night on a CPAP pressure of 11 CM. She tolerates her CPAP pressure well. She uses Ambien at night as needed and feels this helps her to maintain sleep. She denies any morning headaches. She does feel more refreshed on therapy. Overnight oximetry was performed 8/21/2017 on CPAP of 11 CM H20 with 2 LPM 02 bleed in and indicates a mean 02 saturation of 92.8%.      She has a history of Follicular Lymphoma and Lung Cancer with a prior lung resection in 2012. She is followed by Oncology, Dr. Rodríguez. She had PFT's while she was in the hospital February 2016 for an exacerbation of her CHF. PFT's indicated evidence of restriction with TLC 75% which was similar to her 2013 PFT's. However, her DLCO was 64% predicted and in 2013 it was 90% predicted.    PFT's were updated 3/3/2017 and indicate an FEV1 of 0.96 L, 46% predicted with an FEV1/FVC ratio of 66 with a TLC of 74% predicted and a DLCO of 89% predicted. Her FEV1 has declined from her February 2016 PFT's in which her FEV1 was 1.32 L, 63% predicted. PFT's were updated at her last office visit 8/21/2017 and indicated an FEV1 of 1.04 L, 46% predicted with an FEV1/FVC ratio of 79 with a TLC of 55% and a DLCO of 83%. Query restriction related to CHF. She had another hospitalization earlier this month for acute on chronic heart failure. Echo indicated a worsening EF down to 15%. Her diuretics were changed. She is on 02 at 2 LPM prn exertion. 6 minute walk testing indicates adequate RA saturations of 90-93% with exertion. RA saturation today in the office is 93%.     She is feeling better with the new diuretic. She  denies current lower extremity swelling. She is scheduled to see Cardiology next week.    She notes dyspnea with exertion, which she feels is unchanged. She admits to not being as active as she had been in the past. She notes an occasional dry cough. She denies mucous production. She does note occasional post nasal drip. She denies any wheezing. She denies any fevers or chills. She is on Spiriva respimat 2.5 mcg 2 puffs INH daily. She is using the Spiriva daily and feels it has helped some. She has used the Albuterol inhaler a couple times when she was traveling and more active. She denies any recent respiratory infections.         CT chest, abdomen and pelvis 8/4/2016;  1.  Focal opacities in the right middle lobe have resolved when compared to previous exam. Small bilateral pleural-based nodules are again appreciated. The nodule in the posterior left lower lobe appears broader and flatter than it did on the previous   examination. This changes of uncertain significance.  2.  Slight increase in size of heterogeneous enhancing anterior superior mediastinal mass with current measurements of 3.2 x 3.6 cm.  3.  Hepatomegaly with fatty infiltration.  4.  Interval resolution of small bilateral pleural effusions.     She continue to follow with Cardiology for her CHF. She has a biventricular AICD.      She had an Echocardigram 6/2/2017 which indicated;  CONCLUSIONS  Prior echo 1-31-16.Normal left ventricular chamber size.  Mild left ventricular hypertrophy.  Left ventricular ejection fraction is visually estimated to be 35%.  Global hypokinesis with regional variation.  Grade IV diastolic dysfunction (restrictive pattern).  Pacer/ICD wire seen in right ventricle.  Pacer/ICD wire seen in right atrium.  Aortic sclerosis without stenosis.  Moderate aortic insufficiency.  Mitral annular calcification.  Moderate tricuspid regurgitation.  Estimated right ventricular systolic pressure  is 65 mmHg.  Right heart pressures are  consistent with moderate pulmonary   hypertension.  Mild pulmonic insufficiency.  Normal pericardium without effusion.    Echo 11/8/2017 indicates;    CONCLUSIONS  Compared to the images of the study done 6/1/17- there has been   reduction of LVSF.  Left ventricle is severely dilated.  Severely reduced left ventricular systolic function.  Left ventricular ejection fraction is visually estimated to be 15%.  Grade III diastolic dysfunction (restrictive pattern).  Pacer/ICD wire seen in right ventricle.  Moderate to severe mitral regurgitation.  Mild aortic insufficiency.  Mild aortic insufficiency.  Right ventricular systolic pressure is estimated to be 55 mmHg.    Chest xray 11/6/2017 indicates;  1.  Decreased atelectasis  2.  BILATERAL pleural effusions  3.  Persistently enlarged cardiac silhouette    Past Medical History:   Diagnosis Date   • ACC/AHA stage C systolic heart failure (CMS-HCC) 11/8/2017   • Arrhythmia    • Bowel habit changes     Diarrhea.   • Cancer (CMS-[facility]) 2012    Lymphoma to neck   • Cardiac arrest (CMS-HCC) 1998        • CATARACT     Beginning stages no surgery yet   • Congestive heart failure (CHF) (CMS-HCC)    • Dental disorder     Upper/Lower dentures.   • Depression    • Diabetes (CMS-[facility])    • Dilated cardiomyopathy    • Hypercholesteremia    • Hypertension 1998    Pt states bp runs low on current meds   • Indigestion    • Joint replacement         • LBBB (left bundle branch block)    • Lung cancer (CMS-[facility]) 2012        • Myocardial infarct 1998   • Neuropathy    • Obesity    • Pain 2011    Legs pain controled on meds   • Peripheral neuropathy (CMS-[facility])    • Renal cyst, acquired, left      History of benign cystic mass on the lower left kidney.   • Sleep apnea    • Unspecified hemorrhagic conditions     from aspirin   • Ventricular tachycardia (CMS-HCC)        Past Surgical History:   Procedure Laterality Date   • AICD BATTERY CHANGE  November 2016    Generator replacement with [employer]  Viva S CRT-D [id number] implanted by Dr. Alexis. Original device implanted in 1998   • LARYNGOSCOPY  11/14/2013    Performed by Judson Willis M.D. at SURGERY SAME DAY [facility] ORS   • BIOPSY GENERAL  11/14/2013    Performed by Judson Willis M.D. at SURGERY SAME DAY [facility]   • THORACOSCOPY ROBOTIC  8/27/2012    Performed by GANSER, JOHN H at SURGERY [facility]   • RECOVERY  7/23/2012    Performed by SURGERY, IR-RECOVERY at SURGERY SAME DAY [facility] ORS   • BONE MARROW ASPIRATION  6/22/2012    Performed by ISAIAS EVERETT at ENDOSCOPY [facility] ORS   • BONE MARROW BIOPSY, NDL/TROCAR  6/22/2012    Performed by ISAIAS EVERETT at ENDOSCOPY [facility]   • NECK MASS EXCISION  5/24/2012    Performed by JUDSON WILLIS at SURGERY SAME DAY [facility]S   • RECOVERY  10/21/2011    Performed by SURGERY, CATH-RECOVERY at SURGERY SAME DAY [facility]   • CARPAL TUNNEL RELEASE  4/3/08    Performed by DEEP GREEN at SURGERY [facility]   • LIPOMA EXCISION  4/3/08    Performed by DEEP GREEN at [facility]   • CARPAL TUNNEL RELEASE Right 05/07        • HYSTERECTOMY, TOTAL ABDOMINAL  1995   • HYSTERECTOMY, TOTAL ABDOMINAL          • IMPLANTABLE CARDIOVERTER DEFIBRILLATOR (ICD)  4/1/98, 03/2007, 10/2011   • TONSILLECTOMY AND ADENOIDECTOMY              Family History   Problem Relation Age of Onset   • Diabetes Mother    • Cancer Mother    • Diabetes Sister    • Diabetes Maternal Grandmother        Social History     Social History   • Marital status:      Spouse name: N/A   • Number of children: N/A   • Years of education: N/A     Occupational History   • Not on file.     Social History Main Topics   • Smoking status: Former Smoker     Packs/day: 2.00     Years: 30.00     Types: Cigarettes     Quit date: 10/20/1998   • Smokeless tobacco: Never Used   • Alcohol use 0.0 - 1.2 oz/week      Comment: occasional    • Drug use: No   • Sexual activity: Not Currently     Other Topics  Concern   • Not on file     Social History Narrative   • No narrative on file         ROS:  Constitutional: Denies fevers, chills, sweats, fatigue, weight loss  Eyes: Denies glasses, vision loss, pain, drainage, double vision  Ears/Nose/Mouth/Throat: Denies rhinitis, nasal congestion, ear ache, difficulty hearing, sore throat, persistent hoarseness, decayed teeth/toothache  Cardiovascular: Denies chest pain, tightness, palpitations, fainting, difficulty breathing when laying down  Respiratory: See HPI   GI: Denies heartburn, difficulty swallowing, nausea, vomiting, abdominal pain, diarrhea, constipation  : Denies frequent urination, painful urination  Integumentary: Denies rashes, lumps or color changes  MSK: Denies painful joints, sore muscles, and back pain.   Neurological: Denies frequent headaches, dizziness, weakness  Sleep: See HPI       Current Outpatient Prescriptions   Medication Sig Dispense Refill   • carvedilol (COREG) 6.25 MG Tab Take 1 Tab by mouth 2 times a day, with meals. 180 Tab 3   • spironolactone (ALDACTONE) 25 MG Tab Take 0.5 Tabs by mouth every day. 45 Tab 3   • digoxin (LANOXIN) 125 MCG Tab Take 1 Tab by mouth every day. 90 Tab 3   • torsemide (DEMADEX) 20 MG Tab Take 2 Tabs by mouth every day. 60 Tab 0   • metformin (GLUCOPHAGE) 500 MG Tab Take 1 Tab by mouth 2 times a day, with meals. 60 Tab 2   • sacubitril-valsartan (ENTRESTO) 24-26 MG Tab tablet Take 1 Tab by mouth 2 Times a Day. 180 Tab 3   • zolpidem (AMBIEN) 10 MG Tab TAKE ONE TABLET BY MOUTH AT BEDTIME, as needed prn insomnia 90 Tab 1   • albuterol (PROAIR HFA) 108 (90 BASE) MCG/ACT Aero Soln inhalation aerosol Inhale 2 Puffs by mouth every 6 hours as needed for Shortness of Breath. 3 Inhaler 3   • Tiotropium Bromide Monohydrate (SPIRIVA RESPIMAT) 2.5 MCG/ACT Aero Soln Inhale 2 Inhalation by mouth every day. Assemble and prime. 3 Inhaler 3   • Cyanocobalamin (VITAMIN B-12 SL) Place 1 Tab under tongue every day.     • aspirin (ASA)  "325 MG TABS Take 325 mg by mouth every bedtime.     • Multiple Vitamin (MULTI-VITAMINS) TABS Take 1 Tab by mouth every day.     • Calcium Carbonate-Vitamin D (CALCIUM 600+D) 600-200 MG-UNIT TABS Take 2 Tabs by mouth every day.     • allopurinol (ZYLOPRIM) 300 MG TABS Take 300 mg by mouth every day.     • gabapentin (NEURONTIN) 300 MG CAPS Take 600 mg by mouth 3 times a day.       No current facility-administered medications for this visit.        Allergies   Allergen Reactions   • Tape Contact Dermatitis   • Ace Inhibitors Cough     cough       Blood pressure 126/62, pulse (!) 103, resp. rate 16, height 1.575 m (5' 2\"), weight 70.1 kg (154 lb 9.6 oz), last menstrual period 11/05/1994, SpO2 93 %.    PE:   Appearance: Well developed, well nourished, no acute distress  Eyes: PERRL, EOM intact, sclera white, conjunctiva moist  Ears: no lesions or deformities  Hearing: grossly intact  Nose: no lesions or deformities  Oropharynx: tongue normal, posterior pharynx without erythema or exudate  Neck: supple, trachea midline, no masses   Respiratory effort: no intercostal retractions or use of accessory muscles  Lung auscultation: no rales, rhonchi or wheezes  Heart auscultation: no murmur rub or gallop  Extremities: no cyanosis or edema  Abdomen: soft ,non tender, no masses  Gait and Station: normal  Digits and nails: no clubbing, cyanosis, petechiae or nodes.  Cranial nerves: grossly intact  Skin: no rashes, lesions or ulcers noted  Orientation: Oriented to time, person and place  Mood and affect: mood and affect appropriate, normal interaction with examiner  Judgement: Intact          Assessment:  1. Obstructive sleep apnea     2. Hypoxemia  AMB PULMONARY FUNCTION TEST/LAB   3. Mediastinal mass     4. H/O: lung cancer     5. Other type of follicular lymphoma, unspecified body region (CMS-HCC)     6. Combined systolic and diastolic congestive heart failure, unspecified congestive heart failure chronicity (CMS-HCC)  " DX-CHEST-2 VIEWS   7. Pulmonary hypertension     8. Pleural effusion  DX-CHEST-2 VIEWS   9. Restrictive lung disease  AMB PULMONARY FUNCTION TEST/LAB         Plan:    1) She is feeling better overall with her new diuretic. She is scheduled to follow with Cardiology next week. Restrictive changes on PFT's are felt to be related to her CHF. Recent chest xray indicated bilateral effusions. She is not hypoxic and this time. She states the Cardiologist had discussed an implantable device to monitor her CHF. She would like to wait to have this until January as she is planning a trip to [facility] for her 50th wedding anniversary. We will arrange 3 month follow up with repeat chest xray and PFT's. Encouraged sooner for worsening symptoms.  2) Continue CPAP at 11 CM H20 with 2 LPM 02 bleed in. Chip indicates adequate treatment with excellent compliance and recent oximetry indicated adequate 02 saturations.   3) Sleep hygiene discussed. Continue prn Ambien.  4) Continue Spiriva and Albuterol inhalers.  5) She is up to date on Pneumovax 23, Prevnar 13 and Influenza vaccinations.  6) Continue to follow with Oncology.  7) 3 month follow up with Cardiology and Oncology consult records with updated PFT's and chest xray, sooner OV if needed.

## 2017-12-07 ENCOUNTER — TELEPHONE (OUTPATIENT)
Dept: PULMONOLOGY | Facility: HOSPICE | Age: 67
End: 2017-12-07

## 2017-12-15 ENCOUNTER — OFFICE VISIT (OUTPATIENT)
Dept: CARDIOLOGY | Facility: MEDICAL CENTER | Age: 67
End: 2017-12-15
Payer: MEDICARE

## 2017-12-15 VITALS
OXYGEN SATURATION: 92 % | HEART RATE: 86 BPM | WEIGHT: 153 LBS | SYSTOLIC BLOOD PRESSURE: 90 MMHG | HEIGHT: 62 IN | BODY MASS INDEX: 28.16 KG/M2 | DIASTOLIC BLOOD PRESSURE: 62 MMHG

## 2017-12-15 DIAGNOSIS — C34.00 MALIGNANT NEOPLASM OF HILUS OF LUNG, UNSPECIFIED LATERALITY (HCC): ICD-10-CM

## 2017-12-15 DIAGNOSIS — I50.20 ACC/AHA STAGE C SYSTOLIC HEART FAILURE (HCC): ICD-10-CM

## 2017-12-15 DIAGNOSIS — E11.42 TYPE 2 DIABETES MELLITUS WITH DIABETIC POLYNEUROPATHY, WITHOUT LONG-TERM CURRENT USE OF INSULIN (HCC): ICD-10-CM

## 2017-12-15 DIAGNOSIS — I42.0 DILATED CARDIOMYOPATHY (HCC): ICD-10-CM

## 2017-12-15 DIAGNOSIS — I50.40 COMBINED SYSTOLIC AND DIASTOLIC CONGESTIVE HEART FAILURE, UNSPECIFIED CONGESTIVE HEART FAILURE CHRONICITY: ICD-10-CM

## 2017-12-15 DIAGNOSIS — E11.9 TYPE 2 DIABETES MELLITUS WITHOUT COMPLICATION, WITHOUT LONG-TERM CURRENT USE OF INSULIN (HCC): ICD-10-CM

## 2017-12-15 DIAGNOSIS — I44.7 LBBB (LEFT BUNDLE BRANCH BLOCK): ICD-10-CM

## 2017-12-15 DIAGNOSIS — I10 ESSENTIAL HYPERTENSION: ICD-10-CM

## 2017-12-15 DIAGNOSIS — I35.1 NONRHEUMATIC AORTIC VALVE INSUFFICIENCY: ICD-10-CM

## 2017-12-15 DIAGNOSIS — I50.43 ACUTE ON CHRONIC COMBINED SYSTOLIC AND DIASTOLIC CHF (CONGESTIVE HEART FAILURE) (HCC): ICD-10-CM

## 2017-12-15 PROCEDURE — 99215 OFFICE O/P EST HI 40 MIN: CPT | Performed by: INTERNAL MEDICINE

## 2017-12-15 RX ORDER — CITALOPRAM 20 MG/1
TABLET ORAL
COMMUNITY
Start: 2017-10-18 | End: 2018-01-07

## 2017-12-15 ASSESSMENT — ENCOUNTER SYMPTOMS
SORE THROAT: 0
CHILLS: 0
SPUTUM PRODUCTION: 0
EYES NEGATIVE: 1
LOSS OF CONSCIOUSNESS: 0
DIZZINESS: 0
PND: 0
WHEEZING: 0
GASTROINTESTINAL NEGATIVE: 1
CLAUDICATION: 0
MUSCULOSKELETAL NEGATIVE: 1
FEVER: 0
PALPITATIONS: 0
WEAKNESS: 0
CONSTITUTIONAL NEGATIVE: 1
STRIDOR: 0
NEUROLOGICAL NEGATIVE: 1
SHORTNESS OF BREATH: 0
ORTHOPNEA: 0
BRUISES/BLEEDS EASILY: 0
COUGH: 0
HEMOPTYSIS: 0
CARDIOVASCULAR NEGATIVE: 1
RESPIRATORY NEGATIVE: 1

## 2017-12-15 NOTE — PROGRESS NOTES
Subjective:   Iastu Dang is a 67 y.o. female who presents today as a follow-up for her severe stage C systolic heart failure. Since I last saw her she is doing fine. Her blood pressures are ranging about . She's had no changes in her status and no worsening lower extremity edema.  She is leaving to go to [place] for a week to renew her vows with her  for their 50th.  Otherwise she is doing well with no changes in her symptoms.    Past Medical History:   Diagnosis Date   • ACC/AHA stage C systolic heart failure (CMS-HCC) 11/8/2017   • Arrhythmia    • Bowel habit changes     Diarrhea.   • Cancer (CMS-[facility]) 2012    Lymphoma to neck   • Cardiac arrest (CMS-[facility]) 1998        • CATARACT     Beginning stages no surgery yet   • Congestive heart failure (CHF) (CMS-[facility])    • Dental disorder     Upper/Lower dentures.   • Depression    • Diabetes (CMS-[facility])    • Dilated cardiomyopathy    • Hypercholesteremia    • Hypertension 1998    Pt states bp runs low on current meds   • Indigestion    • Joint replacement         • LBBB (left bundle branch block)    • Lung cancer (CMS-[facility]) 2012        • Myocardial infarct 1998   • Neuropathy    • Obesity    • Pain 2011    Legs pain controled on meds   • Peripheral neuropathy (CMS-[facility])    • Renal cyst, acquired, left      History of benign cystic mass on the lower left kidney.   • Sleep apnea    • Unspecified hemorrhagic conditions     from aspirin   • Ventricular tachycardia (CMS-[facility])      Past Surgical History:   Procedure Laterality Date   • AICD BATTERY CHANGE  November 2016    Generator replacement with [employer] Viva S CRT-D [id number] implanted by Dr. Alexis. Original device implanted in 1998   • LARYNGOSCOPY  11/14/2013    Performed by Judson Willis M.D. at SURGERY SAME DAY [facility] ORS   • BIOPSY GENERAL  11/14/2013    Performed by Judson Willis M.D. at SURGERY SAME DAY [facility] ORS   • THORACOSCOPY ROBOTIC  8/27/2012    Performed by GANSER, JOHN H at SURGERY [facility]S   • RECOVERY  7/23/2012    Performed by SURGERY, IR-RECOVERY at SURGERY SAME DAY [facility] ORS   • BONE MARROW ASPIRATION  6/22/2012    Performed by ISAIAS EVERETT at ENDOSCOPY [facility] ORS   • BONE MARROW BIOPSY, NDL/TROCAR  6/22/2012    Performed by ISAIAS EVERETT at ENDOSCOPY [facility] ORS   • NECK MASS EXCISION  5/24/2012    Performed by LAURA RAMIRES at SURGERY SAME DAY [facility] ORS   • RECOVERY  10/21/2011    Performed by SURGERY, CATH-RECOVERY at SURGERY SAME DAY [facility] ORS   • CARPAL TUNNEL RELEASE  4/3/08    Performed by DEEP GREEN at SURGERY [facility]   • LIPOMA EXCISION  4/3/08    Performed by DEEP GREEN at SURGERY [facility] ORS   • CARPAL TUNNEL RELEASE Right 05/07        • HYSTERECTOMY, TOTAL ABDOMINAL  1995   • HYSTERECTOMY, TOTAL ABDOMINAL          • IMPLANTABLE CARDIOVERTER DEFIBRILLATOR (ICD)  4/1/98, 03/2007, 10/2011   • TONSILLECTOMY AND ADENOIDECTOMY            Family History   Problem Relation Age of Onset   • Diabetes Mother    • Cancer Mother    • Diabetes Sister    • Diabetes Maternal Grandmother      History   Smoking Status   • Former Smoker   • Packs/day: 2.00   • Years: 30.00   • Types: Cigarettes   • Quit date: 10/20/1998   Smokeless Tobacco   • Never Used     Allergies   Allergen Reactions   • Tape Contact Dermatitis   • Ace Inhibitors Cough     cough     Outpatient Encounter Prescriptions as of 12/15/2017   Medication Sig Dispense Refill   • citalopram (CELEXA) 20 MG Tab      • zolpidem (AMBIEN) 10 MG Tab TAKE ONE TABLET BY MOUTH AT BEDTIME, as needed prn insomnia 90 Tab 1   • carvedilol (COREG) 6.25 MG Tab Take 1 Tab by mouth 2 times a day, with meals. 180 Tab 3   • spironolactone (ALDACTONE) 25 MG Tab Take 0.5 Tabs by mouth every day. 45 Tab 3   • digoxin (LANOXIN) 125 MCG Tab Take 1 Tab by mouth every day. 90 Tab 3   • torsemide (DEMADEX) 20 MG Tab Take 2 Tabs by mouth every day. 60 Tab 0   • metformin (GLUCOPHAGE) 500 MG Tab Take 1  "Tab by mouth 2 times a day, with meals. 60 Tab 2   • sacubitril-valsartan (ENTRESTO) 24-26 MG Tab tablet Take 1 Tab by mouth 2 Times a Day. 180 Tab 3   • albuterol (PROAIR HFA) 108 (90 BASE) MCG/ACT Aero Soln inhalation aerosol Inhale 2 Puffs by mouth every 6 hours as needed for Shortness of Breath. 3 Inhaler 3   • Tiotropium Bromide Monohydrate (SPIRIVA RESPIMAT) 2.5 MCG/ACT Aero Soln Inhale 2 Inhalation by mouth every day. Assemble and prime. 3 Inhaler 3   • Cyanocobalamin (VITAMIN B-12 SL) Place 1 Tab under tongue every day.     • aspirin (ASA) 325 MG TABS Take 325 mg by mouth every bedtime.     • Multiple Vitamin (MULTI-VITAMINS) TABS Take 1 Tab by mouth every day.     • Calcium Carbonate-Vitamin D (CALCIUM 600+D) 600-200 MG-UNIT TABS Take 2 Tabs by mouth every day.     • allopurinol (ZYLOPRIM) 300 MG TABS Take 300 mg by mouth every day.     • gabapentin (NEURONTIN) 300 MG CAPS Take 600 mg by mouth 3 times a day.       No facility-administered encounter medications on file as of 12/15/2017.      Review of Systems   Constitutional: Negative.  Negative for chills, fever and malaise/fatigue.   HENT: Negative.  Negative for sore throat.    Eyes: Negative.    Respiratory: Negative.  Negative for cough, hemoptysis, sputum production, shortness of breath, wheezing and stridor.    Cardiovascular: Negative.  Negative for chest pain, palpitations, orthopnea, claudication, leg swelling and PND.   Gastrointestinal: Negative.    Genitourinary: Negative.    Musculoskeletal: Negative.    Skin: Negative.    Neurological: Negative.  Negative for dizziness, loss of consciousness and weakness.   Endo/Heme/Allergies: Negative.  Does not bruise/bleed easily.   All other systems reviewed and are negative.       Objective:   BP (!) 90/62   Pulse 86   Ht 1.575 m (5' 2\")   Wt 69.4 kg (153 lb)   LMP 11/05/1994   SpO2 92%   BMI 27.98 kg/m²     Physical Exam   Constitutional: She is oriented to person, place, and time. She appears " well-developed and well-nourished. No distress.   HENT:   Head: Normocephalic.   Mouth/Throat: Oropharynx is clear and moist.   Eyes: EOM are normal. Pupils are equal, round, and reactive to light. Right eye exhibits no discharge. Left eye exhibits no discharge. No scleral icterus.   Neck: Normal range of motion. Neck supple. No JVD present. No tracheal deviation present.   Cardiovascular: Normal rate, regular rhythm, S1 normal, S2 normal, normal heart sounds, intact distal pulses and normal pulses.  Exam reveals no gallop, no S3, no S4 and no friction rub.    No murmur heard.   No systolic murmur is present    No diastolic murmur is present   Pulses:       Carotid pulses are 2+ on the right side, and 2+ on the left side.       Radial pulses are 2+ on the right side, and 2+ on the left side.        Dorsalis pedis pulses are 2+ on the right side, and 2+ on the left side.        Posterior tibial pulses are 2+ on the right side, and 2+ on the left side.   Pulmonary/Chest: Effort normal and breath sounds normal. No respiratory distress. She has no wheezes. She has no rales.   Abdominal: Soft. Bowel sounds are normal. She exhibits no distension and no mass. There is no tenderness. There is no rebound and no guarding.   Musculoskeletal: She exhibits no edema.   Neurological: She is alert and oriented to person, place, and time. No cranial nerve deficit.   Skin: Skin is warm and dry. She is not diaphoretic. No pallor.   Psychiatric: She has a normal mood and affect. Her behavior is normal. Judgment and thought content normal.   Nursing note and vitals reviewed.      Assessment:     1. Essential hypertension     2. Dilated cardiomyopathy (CMS-HCC)     3. Combined systolic and diastolic congestive heart failure, unspecified congestive heart failure chronicity (CMS-HCC)     4. Nonrheumatic aortic valve insufficiency     5. ACC/AHA stage C systolic heart failure (CMS-HCC)     6. Acute on chronic combined systolic and diastolic  CHF (congestive heart failure) (CMS-[facility])     7. LBBB (left bundle branch block)     8. Malignant neoplasm of hilus of lung, unspecified laterality (CMS-[facility])     9. Type 2 diabetes mellitus with diabetic polyneuropathy, without long-term current use of insulin (CMS-[facility])     10. Type 2 diabetes mellitus without complication, without long-term current use of insulin (CMS-HCC)         Medical Decision Making:  Today's Assessment / Status / Plan:     66 y/o F with severe Stage C systolic CHF.  No changes in her medical therapy today. We will see her back in one month. I will have her referred for implantable PA monitor after she gets back from [place]. We will also discuss that the [employer] HF trial.  I did remind her to take her scale with her on her trip and call if her weights are changing.    Thank for you allowing me to take part in your patient's care, please call should you have any questions or would like to discuss this patient.

## 2017-12-15 NOTE — LETTER
[facility]   [address]  [place] [address]  Phone: [phone]  Fax: [phone]              Isatu Dang  1950    Encounter Date: 12/15/2017    Gregorio Betancourt M.D.          PROGRESS NOTE:  Subjective:   Isatu Dang is a 67 y.o. female who presents today as a follow-up for her severe stage C systolic heart failure. Since I last saw her she is doing fine. Her blood pressures are ranging about . She's had no changes in her status and no worsening lower extremity edema.  She is leaving to go to [place] for a week to renew her vows with her  for their 50th.  Otherwise she is doing well with no changes in her symptoms.    Past Medical History:   Diagnosis Date   • ACC/AHA stage C systolic heart failure (CMS-HCC) 11/8/2017   • Arrhythmia    • Bowel habit changes     Diarrhea.   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   • Depression    • Diabetes (CMS-[facility])    • Dilated cardiomyopathy    • Hypercholesteremia    • Hypertension 1998    Pt states bp runs low on current meds   • Indigestion    • Joint replacement         • LBBB (left bundle branch block)    • Lung cancer (CMS-[facility]) 2012        • Myocardial infarct 1998   • Neuropathy    • Obesity    • Pain 2011    Legs pain controled on meds   • Peripheral neuropathy (CMS-[facility])    • Renal cyst, acquired, left      History of benign cystic mass on the lower left kidney.   • Sleep apnea    • Unspecified hemorrhagic conditions     from aspirin   • Ventricular tachycardia (CMS-[facility])      Past Surgical History:   Procedure Laterality Date   • AICD BATTERY CHANGE  November 2016    Generator replacement with Soteira Viva S CRT-D [id number] implanted by Dr. Alexis. Original device implanted in 1998   • LARYNGOSCOPY   11/14/2013    Performed by Judson Willis M.D. at SURGERY SAME DAY [facility] ORS   • BIOPSY GENERAL  11/14/2013    Performed by Judson Willis M.D. at SURGERY SAME DAY [facility]   • THORACOSCOPY ROBOTIC  8/27/2012    Performed by GANSER, JOHN H at SURGERY [facility] ORS   • RECOVERY  7/23/2012    Performed by SURGERY, IR-RECOVERY at SURGERY SAME DAY [facility] ORS   • BONE MARROW ASPIRATION  6/22/2012    Performed by ISAIAS EVERETT at ENDOSCOPY [facility] ORS   • BONE MARROW BIOPSY, NDL/TROCAR  6/22/2012    Performed by ISAIAS EVERETT at ENDOSCOPY [facility] ORS   • NECK MASS EXCISION  5/24/2012    Performed by JUDSON WILLIS at SURGERY SAME DAY [facility] ORS   • RECOVERY  10/21/2011    Performed by CHARLES, CATH-RECOVERY at SURGERY SAME DAY [facility] ORS   • CARPAL TUNNEL RELEASE  4/3/08    Performed by DEEP GREEN at SURGERY [facility]   • LIPOMA EXCISION  4/3/08    Performed by DEEP GREEN at [facility]   • CARPAL TUNNEL RELEASE Right 05/07        • HYSTERECTOMY, TOTAL ABDOMINAL  1995   • HYSTERECTOMY, TOTAL ABDOMINAL          • IMPLANTABLE CARDIOVERTER DEFIBRILLATOR (ICD)  4/1/98, 03/2007, 10/2011   • TONSILLECTOMY AND ADENOIDECTOMY            Family History   Problem Relation Age of Onset   • Diabetes Mother    • Cancer Mother    • Diabetes Sister    • Diabetes Maternal Grandmother      History   Smoking Status   • Former Smoker   • Packs/day: 2.00   • Years: 30.00   • Types: Cigarettes   • Quit date: 10/20/1998   Smokeless Tobacco   • Never Used     Allergies   Allergen Reactions   • Tape Contact Dermatitis   • Ace Inhibitors Cough     cough     Outpatient Encounter Prescriptions as of 12/15/2017   Medication Sig Dispense Refill   • citalopram (CELEXA) 20 MG Tab      • zolpidem (AMBIEN) 10 MG Tab TAKE ONE TABLET BY MOUTH AT BEDTIME, as needed prn insomnia 90 Tab 1   • carvedilol (COREG) 6.25 MG Tab Take 1 Tab by mouth 2 times a day, with meals. 180 Tab 3   •  spironolactone (ALDACTONE) 25 MG Tab Take 0.5 Tabs by mouth every day. 45 Tab 3   • digoxin (LANOXIN) 125 MCG Tab Take 1 Tab by mouth every day. 90 Tab 3   • torsemide (DEMADEX) 20 MG Tab Take 2 Tabs by mouth every day. 60 Tab 0   • metformin (GLUCOPHAGE) 500 MG Tab Take 1 Tab by mouth 2 times a day, with meals. 60 Tab 2   • sacubitril-valsartan (ENTRESTO) 24-26 MG Tab tablet Take 1 Tab by mouth 2 Times a Day. 180 Tab 3   • albuterol (PROAIR HFA) 108 (90 BASE) MCG/ACT Aero Soln inhalation aerosol Inhale 2 Puffs by mouth every 6 hours as needed for Shortness of Breath. 3 Inhaler 3   • Tiotropium Bromide Monohydrate (SPIRIVA RESPIMAT) 2.5 MCG/ACT Aero Soln Inhale 2 Inhalation by mouth every day. Assemble and prime. 3 Inhaler 3   • Cyanocobalamin (VITAMIN B-12 SL) Place 1 Tab under tongue every day.     • aspirin (ASA) 325 MG TABS Take 325 mg by mouth every bedtime.     • Multiple Vitamin (MULTI-VITAMINS) TABS Take 1 Tab by mouth every day.     • Calcium Carbonate-Vitamin D (CALCIUM 600+D) 600-200 MG-UNIT TABS Take 2 Tabs by mouth every day.     • allopurinol (ZYLOPRIM) 300 MG TABS Take 300 mg by mouth every day.     • gabapentin (NEURONTIN) 300 MG CAPS Take 600 mg by mouth 3 times a day.       No facility-administered encounter medications on file as of 12/15/2017.      Review of Systems   Constitutional: Negative.  Negative for chills, fever and malaise/fatigue.   HENT: Negative.  Negative for sore throat.    Eyes: Negative.    Respiratory: Negative.  Negative for cough, hemoptysis, sputum production, shortness of breath, wheezing and stridor.    Cardiovascular: Negative.  Negative for chest pain, palpitations, orthopnea, claudication, leg swelling and PND.   Gastrointestinal: Negative.    Genitourinary: Negative.    Musculoskeletal: Negative.    Skin: Negative.    Neurological: Negative.  Negative for dizziness, loss of consciousness and weakness.   Endo/Heme/Allergies: Negative.  Does not bruise/bleed easily.   All  "other systems reviewed and are negative.       Objective:   BP (!) 90/62   Pulse 86   Ht 1.575 m (5' 2\")   Wt 69.4 kg (153 lb)   LMP 11/05/1994   SpO2 92%   BMI 27.98 kg/m²      Physical Exam   Constitutional: She is oriented to person, place, and time. She appears well-developed and well-nourished. No distress.   HENT:   Head: Normocephalic.   Mouth/Throat: Oropharynx is clear and moist.   Eyes: EOM are normal. Pupils are equal, round, and reactive to light. Right eye exhibits no discharge. Left eye exhibits no discharge. No scleral icterus.   Neck: Normal range of motion. Neck supple. No JVD present. No tracheal deviation present.   Cardiovascular: Normal rate, regular rhythm, S1 normal, S2 normal, normal heart sounds, intact distal pulses and normal pulses.  Exam reveals no gallop, no S3, no S4 and no friction rub.    No murmur heard.   No systolic murmur is present    No diastolic murmur is present   Pulses:       Carotid pulses are 2+ on the right side, and 2+ on the left side.       Radial pulses are 2+ on the right side, and 2+ on the left side.        Dorsalis pedis pulses are 2+ on the right side, and 2+ on the left side.        Posterior tibial pulses are 2+ on the right side, and 2+ on the left side.   Pulmonary/Chest: Effort normal and breath sounds normal. No respiratory distress. She has no wheezes. She has no rales.   Abdominal: Soft. Bowel sounds are normal. She exhibits no distension and no mass. There is no tenderness. There is no rebound and no guarding.   Musculoskeletal: She exhibits no edema.   Neurological: She is alert and oriented to person, place, and time. No cranial nerve deficit.   Skin: Skin is warm and dry. She is not diaphoretic. No pallor.   Psychiatric: She has a normal mood and affect. Her behavior is normal. Judgment and thought content normal.   Nursing note and vitals reviewed.      Assessment:     1. Essential hypertension     2. Dilated cardiomyopathy (CMS-HCC)     3. " Combined systolic and diastolic congestive heart failure, unspecified congestive heart failure chronicity (CMS-HCC)     4. Nonrheumatic aortic valve insufficiency     5. ACC/AHA stage C systolic heart failure (CMS-HCC)     6. Acute on chronic combined systolic and diastolic CHF (congestive heart failure) (CMS-[facility])     7. LBBB (left bundle branch block)     8. Malignant neoplasm of hilus of lung, unspecified laterality (CMS-[facility])     9. Type 2 diabetes mellitus with diabetic polyneuropathy, without long-term current use of insulin (CMS-[facility])     10. Type 2 diabetes mellitus without complication, without long-term current use of insulin (CMS-[facility])         Medical Decision Making:  Today's Assessment / Status / Plan:     66 y/o F with severe Stage C systolic CHF.  No changes in her medical therapy today. We will see her back in one month. I will have her referred for implantable PA monitor after she gets back from [place]. We will also discuss that the [employer] HF trial.  I did remind her to take her scale with her on her trip and call if her weights are changing.    Thank for you allowing me to take part in your patient's care, please call should you have any questions or would like to discuss this patient.      Edwin Urias M.D.  [id number]  [address]  [place] [address]  VIA Facsimile: [phone]

## 2017-12-18 ENCOUNTER — HOSPITAL ENCOUNTER (OUTPATIENT)
Dept: CARDIOLOGY | Facility: MEDICAL CENTER | Age: 67
End: 2017-12-18
Attending: NURSE PRACTITIONER
Payer: MEDICARE

## 2017-12-18 DIAGNOSIS — I50.9 HEART FAILURE, NYHA CLASS 2 (HCC): ICD-10-CM

## 2017-12-18 DIAGNOSIS — I50.22 STAGE C CHRONIC SYSTOLIC CONGESTIVE HEART FAILURE (HCC): ICD-10-CM

## 2017-12-18 LAB
LV EJECT FRACT  99904: 25
LV EJECT FRACT MOD 2C 99903: 12.47
LV EJECT FRACT MOD 4C 99902: 21.08
LV EJECT FRACT MOD BP 99901: 17.78

## 2017-12-18 PROCEDURE — 93306 TTE W/DOPPLER COMPLETE: CPT

## 2017-12-18 PROCEDURE — 93306 TTE W/DOPPLER COMPLETE: CPT | Mod: 26 | Performed by: INTERNAL MEDICINE

## 2017-12-22 ENCOUNTER — HOSPITAL ENCOUNTER (OUTPATIENT)
Dept: RADIOLOGY | Facility: MEDICAL CENTER | Age: 67
End: 2017-12-22
Attending: NURSE PRACTITIONER
Payer: MEDICARE

## 2017-12-22 DIAGNOSIS — Z87.891 PERSONAL HISTORY OF SMOKING: ICD-10-CM

## 2017-12-22 DIAGNOSIS — N95.1 POSTMENOPAUSAL DISORDER: ICD-10-CM

## 2017-12-22 PROCEDURE — 76775 US EXAM ABDO BACK WALL LIM: CPT

## 2017-12-22 PROCEDURE — 77080 DXA BONE DENSITY AXIAL: CPT

## 2018-01-07 ENCOUNTER — RESOLUTE PROFESSIONAL BILLING HOSPITAL PROF FEE (OUTPATIENT)
Dept: HOSPITALIST | Facility: MEDICAL CENTER | Age: 68
End: 2018-01-07
Payer: MEDICARE

## 2018-01-07 ENCOUNTER — HOSPITAL ENCOUNTER (INPATIENT)
Facility: MEDICAL CENTER | Age: 68
LOS: 8 days | DRG: 291 | End: 2018-01-15
Attending: EMERGENCY MEDICINE | Admitting: INTERNAL MEDICINE
Payer: MEDICARE

## 2018-01-07 ENCOUNTER — APPOINTMENT (OUTPATIENT)
Dept: RADIOLOGY | Facility: MEDICAL CENTER | Age: 68
DRG: 291 | End: 2018-01-07
Attending: EMERGENCY MEDICINE
Payer: MEDICARE

## 2018-01-07 PROBLEM — I50.23 ACUTE ON CHRONIC SYSTOLIC HEART FAILURE (HCC): Status: ACTIVE | Noted: 2018-01-07

## 2018-01-07 PROBLEM — J96.20 ACUTE ON CHRONIC RESPIRATORY FAILURE (HCC): Status: ACTIVE | Noted: 2018-01-07

## 2018-01-07 LAB
ALBUMIN SERPL BCP-MCNC: 4 G/DL (ref 3.2–4.9)
ALBUMIN/GLOB SERPL: 1.5 G/DL
ALP SERPL-CCNC: 56 U/L (ref 30–99)
ALT SERPL-CCNC: 21 U/L (ref 2–50)
ANION GAP SERPL CALC-SCNC: 10 MMOL/L (ref 0–11.9)
APTT PPP: 38.3 SEC (ref 24.7–36)
AST SERPL-CCNC: 38 U/L (ref 12–45)
BASE EXCESS BLDV CALC-SCNC: 3 MMOL/L
BASOPHILS # BLD AUTO: 0.8 % (ref 0–1.8)
BASOPHILS # BLD: 0.09 K/UL (ref 0–0.12)
BILIRUB SERPL-MCNC: 0.9 MG/DL (ref 0.1–1.5)
BNP SERPL-MCNC: 3092 PG/ML (ref 0–100)
BODY TEMPERATURE: ABNORMAL CENTIGRADE
BUN SERPL-MCNC: 22 MG/DL (ref 8–22)
CALCIUM SERPL-MCNC: 10.2 MG/DL (ref 8.5–10.5)
CHLORIDE SERPL-SCNC: 101 MMOL/L (ref 96–112)
CO2 SERPL-SCNC: 28 MMOL/L (ref 20–33)
CREAT SERPL-MCNC: 1.17 MG/DL (ref 0.5–1.4)
EKG IMPRESSION: NORMAL
EOSINOPHIL # BLD AUTO: 0.53 K/UL (ref 0–0.51)
EOSINOPHIL NFR BLD: 4.6 % (ref 0–6.9)
ERYTHROCYTE [DISTWIDTH] IN BLOOD BY AUTOMATED COUNT: 52.7 FL (ref 35.9–50)
GFR SERPL CREATININE-BSD FRML MDRD: 46 ML/MIN/1.73 M 2
GLOBULIN SER CALC-MCNC: 2.7 G/DL (ref 1.9–3.5)
GLUCOSE BLD-MCNC: 131 MG/DL (ref 65–99)
GLUCOSE BLD-MCNC: 163 MG/DL (ref 65–99)
GLUCOSE SERPL-MCNC: 145 MG/DL (ref 65–99)
HCO3 BLDV-SCNC: 30 MMOL/L (ref 24–28)
HCT VFR BLD AUTO: 47.9 % (ref 37–47)
HGB BLD-MCNC: 16 G/DL (ref 12–16)
IMM GRANULOCYTES # BLD AUTO: 0.03 K/UL (ref 0–0.11)
IMM GRANULOCYTES NFR BLD AUTO: 0.3 % (ref 0–0.9)
INR PPP: 1.05 (ref 0.87–1.13)
LIPASE SERPL-CCNC: 28 U/L (ref 11–82)
LYMPHOCYTES # BLD AUTO: 1.34 K/UL (ref 1–4.8)
LYMPHOCYTES NFR BLD: 11.7 % (ref 22–41)
MAGNESIUM SERPL-MCNC: 1.9 MG/DL (ref 1.5–2.5)
MCH RBC QN AUTO: 31 PG (ref 27–33)
MCHC RBC AUTO-ENTMCNC: 33.4 G/DL (ref 33.6–35)
MCV RBC AUTO: 92.8 FL (ref 81.4–97.8)
MONOCYTES # BLD AUTO: 0.69 K/UL (ref 0–0.85)
MONOCYTES NFR BLD AUTO: 6 % (ref 0–13.4)
NEUTROPHILS # BLD AUTO: 8.77 K/UL (ref 2–7.15)
NEUTROPHILS NFR BLD: 76.6 % (ref 44–72)
NRBC # BLD AUTO: 0 K/UL
NRBC BLD-RTO: 0 /100 WBC
PCO2 BLDV: 53.3 MMHG (ref 41–51)
PH BLDV: 7.37 [PH] (ref 7.31–7.45)
PHOSPHATE SERPL-MCNC: 3.9 MG/DL (ref 2.5–4.5)
PLATELET # BLD AUTO: 269 K/UL (ref 164–446)
PMV BLD AUTO: 11 FL (ref 9–12.9)
PO2 BLDV: 27.6 MMHG (ref 25–40)
POTASSIUM SERPL-SCNC: 4.9 MMOL/L (ref 3.6–5.5)
PROT SERPL-MCNC: 6.7 G/DL (ref 6–8.2)
PROTHROMBIN TIME: 13.4 SEC (ref 12–14.6)
RBC # BLD AUTO: 5.16 M/UL (ref 4.2–5.4)
SAO2 % BLDV: 46.3 %
SODIUM SERPL-SCNC: 139 MMOL/L (ref 135–145)
TROPONIN I SERPL-MCNC: 0.04 NG/ML (ref 0–0.04)
TROPONIN I SERPL-MCNC: 0.05 NG/ML (ref 0–0.04)
WBC # BLD AUTO: 11.5 K/UL (ref 4.8–10.8)

## 2018-01-07 PROCEDURE — 99285 EMERGENCY DEPT VISIT HI MDM: CPT

## 2018-01-07 PROCEDURE — 84484 ASSAY OF TROPONIN QUANT: CPT

## 2018-01-07 PROCEDURE — 82803 BLOOD GASES ANY COMBINATION: CPT

## 2018-01-07 PROCEDURE — 700105 HCHG RX REV CODE 258: Performed by: EMERGENCY MEDICINE

## 2018-01-07 PROCEDURE — 94660 CPAP INITIATION&MGMT: CPT

## 2018-01-07 PROCEDURE — 82962 GLUCOSE BLOOD TEST: CPT

## 2018-01-07 PROCEDURE — 96372 THER/PROPH/DIAG INJ SC/IM: CPT

## 2018-01-07 PROCEDURE — 99223 1ST HOSP IP/OBS HIGH 75: CPT | Mod: AI | Performed by: INTERNAL MEDICINE

## 2018-01-07 PROCEDURE — 94760 N-INVAS EAR/PLS OXIMETRY 1: CPT

## 2018-01-07 PROCEDURE — 83880 ASSAY OF NATRIURETIC PEPTIDE: CPT

## 2018-01-07 PROCEDURE — 700111 HCHG RX REV CODE 636 W/ 250 OVERRIDE (IP): Performed by: INTERNAL MEDICINE

## 2018-01-07 PROCEDURE — 85610 PROTHROMBIN TIME: CPT

## 2018-01-07 PROCEDURE — 83735 ASSAY OF MAGNESIUM: CPT

## 2018-01-07 PROCEDURE — 96374 THER/PROPH/DIAG INJ IV PUSH: CPT

## 2018-01-07 PROCEDURE — 71045 X-RAY EXAM CHEST 1 VIEW: CPT

## 2018-01-07 PROCEDURE — 93005 ELECTROCARDIOGRAM TRACING: CPT | Performed by: EMERGENCY MEDICINE

## 2018-01-07 PROCEDURE — 85025 COMPLETE CBC W/AUTO DIFF WBC: CPT

## 2018-01-07 PROCEDURE — 770020 HCHG ROOM/CARE - TELE (206)

## 2018-01-07 PROCEDURE — A9270 NON-COVERED ITEM OR SERVICE: HCPCS | Performed by: INTERNAL MEDICINE

## 2018-01-07 PROCEDURE — 84100 ASSAY OF PHOSPHORUS: CPT

## 2018-01-07 PROCEDURE — 304561 HCHG STAT O2

## 2018-01-07 PROCEDURE — 83690 ASSAY OF LIPASE: CPT

## 2018-01-07 PROCEDURE — 80053 COMPREHEN METABOLIC PANEL: CPT

## 2018-01-07 PROCEDURE — 700102 HCHG RX REV CODE 250 W/ 637 OVERRIDE(OP): Performed by: INTERNAL MEDICINE

## 2018-01-07 PROCEDURE — 85730 THROMBOPLASTIN TIME PARTIAL: CPT

## 2018-01-07 RX ORDER — ALBUTEROL SULFATE 90 UG/1
2 AEROSOL, METERED RESPIRATORY (INHALATION) EVERY 6 HOURS PRN
Status: DISCONTINUED | OUTPATIENT
Start: 2018-01-07 | End: 2018-01-15 | Stop reason: HOSPADM

## 2018-01-07 RX ORDER — TIOTROPIUM BROMIDE 18 UG/1
1 CAPSULE ORAL; RESPIRATORY (INHALATION)
Status: DISCONTINUED | OUTPATIENT
Start: 2018-01-07 | End: 2018-01-15 | Stop reason: HOSPADM

## 2018-01-07 RX ORDER — BISACODYL 10 MG
10 SUPPOSITORY, RECTAL RECTAL
Status: DISCONTINUED | OUTPATIENT
Start: 2018-01-07 | End: 2018-01-15 | Stop reason: HOSPADM

## 2018-01-07 RX ORDER — CITALOPRAM 20 MG/1
20 TABLET ORAL DAILY
Status: DISCONTINUED | OUTPATIENT
Start: 2018-01-07 | End: 2018-01-07

## 2018-01-07 RX ORDER — GABAPENTIN 300 MG/1
600 CAPSULE ORAL 3 TIMES DAILY
Status: DISCONTINUED | OUTPATIENT
Start: 2018-01-07 | End: 2018-01-15 | Stop reason: HOSPADM

## 2018-01-07 RX ORDER — FUROSEMIDE 10 MG/ML
40 INJECTION INTRAMUSCULAR; INTRAVENOUS
Status: COMPLETED | OUTPATIENT
Start: 2018-01-07 | End: 2018-01-08

## 2018-01-07 RX ORDER — DIGOXIN 125 MCG
125 TABLET ORAL DAILY
Status: DISCONTINUED | OUTPATIENT
Start: 2018-01-07 | End: 2018-01-07

## 2018-01-07 RX ORDER — SPIRONOLACTONE 25 MG/1
12.5 TABLET ORAL DAILY
Status: DISCONTINUED | OUTPATIENT
Start: 2018-01-07 | End: 2018-01-08

## 2018-01-07 RX ORDER — AMOXICILLIN 250 MG
2 CAPSULE ORAL 2 TIMES DAILY
Status: DISCONTINUED | OUTPATIENT
Start: 2018-01-07 | End: 2018-01-15 | Stop reason: HOSPADM

## 2018-01-07 RX ORDER — SODIUM CHLORIDE 9 MG/ML
INJECTION, SOLUTION INTRAVENOUS CONTINUOUS
Status: DISCONTINUED | OUTPATIENT
Start: 2018-01-07 | End: 2018-01-07

## 2018-01-07 RX ORDER — ONDANSETRON 4 MG/1
4 TABLET, ORALLY DISINTEGRATING ORAL EVERY 4 HOURS PRN
Status: DISCONTINUED | OUTPATIENT
Start: 2018-01-07 | End: 2018-01-15 | Stop reason: HOSPADM

## 2018-01-07 RX ORDER — ONDANSETRON 2 MG/ML
4 INJECTION INTRAMUSCULAR; INTRAVENOUS EVERY 4 HOURS PRN
Status: DISCONTINUED | OUTPATIENT
Start: 2018-01-07 | End: 2018-01-15 | Stop reason: HOSPADM

## 2018-01-07 RX ORDER — CALCIUM CARBONATE 500(1250)
500 TABLET ORAL DAILY
Status: DISCONTINUED | OUTPATIENT
Start: 2018-01-07 | End: 2018-01-15 | Stop reason: HOSPADM

## 2018-01-07 RX ORDER — POLYETHYLENE GLYCOL 3350 17 G/17G
1 POWDER, FOR SOLUTION ORAL
Status: DISCONTINUED | OUTPATIENT
Start: 2018-01-07 | End: 2018-01-15 | Stop reason: HOSPADM

## 2018-01-07 RX ORDER — CARVEDILOL 6.25 MG/1
6.25 TABLET ORAL 2 TIMES DAILY WITH MEALS
Status: DISCONTINUED | OUTPATIENT
Start: 2018-01-07 | End: 2018-01-08

## 2018-01-07 RX ORDER — ALLOPURINOL 300 MG/1
300 TABLET ORAL DAILY
Status: DISCONTINUED | OUTPATIENT
Start: 2018-01-07 | End: 2018-01-15 | Stop reason: HOSPADM

## 2018-01-07 RX ORDER — ZOLPIDEM TARTRATE 5 MG/1
10 TABLET ORAL NIGHTLY PRN
Status: DISCONTINUED | OUTPATIENT
Start: 2018-01-07 | End: 2018-01-12

## 2018-01-07 RX ORDER — DEXTROSE MONOHYDRATE 25 G/50ML
25 INJECTION, SOLUTION INTRAVENOUS
Status: DISCONTINUED | OUTPATIENT
Start: 2018-01-07 | End: 2018-01-15 | Stop reason: HOSPADM

## 2018-01-07 RX ORDER — ASPIRIN 325 MG
325 TABLET ORAL
Status: DISCONTINUED | OUTPATIENT
Start: 2018-01-07 | End: 2018-01-10

## 2018-01-07 RX ORDER — ACETAMINOPHEN 325 MG/1
650 TABLET ORAL EVERY 6 HOURS PRN
Status: DISCONTINUED | OUTPATIENT
Start: 2018-01-07 | End: 2018-01-15 | Stop reason: HOSPADM

## 2018-01-07 RX ADMIN — GABAPENTIN 600 MG: 300 CAPSULE ORAL at 19:39

## 2018-01-07 RX ADMIN — SODIUM CHLORIDE: 9 INJECTION, SOLUTION INTRAVENOUS at 08:45

## 2018-01-07 RX ADMIN — ZOLPIDEM TARTRATE 10 MG: 5 TABLET, FILM COATED ORAL at 19:43

## 2018-01-07 RX ADMIN — GABAPENTIN 600 MG: 300 CAPSULE ORAL at 15:27

## 2018-01-07 RX ADMIN — FUROSEMIDE 40 MG: 10 INJECTION, SOLUTION INTRAMUSCULAR; INTRAVENOUS at 15:22

## 2018-01-07 RX ADMIN — INSULIN HUMAN 2 UNITS: 100 INJECTION, SOLUTION PARENTERAL at 19:52

## 2018-01-07 RX ADMIN — ASPIRIN 325 MG: 325 TABLET ORAL at 19:39

## 2018-01-07 RX ADMIN — FUROSEMIDE 20 MG: 10 INJECTION, SOLUTION INTRAMUSCULAR; INTRAVENOUS at 12:15

## 2018-01-07 RX ADMIN — ENOXAPARIN SODIUM 40 MG: 100 INJECTION SUBCUTANEOUS at 12:15

## 2018-01-07 RX ADMIN — SACUBITRIL AND VALSARTAN 1 TABLET: 24; 26 TABLET, FILM COATED ORAL at 19:44

## 2018-01-07 RX ADMIN — CARVEDILOL 6.25 MG: 6.25 TABLET, FILM COATED ORAL at 17:20

## 2018-01-07 RX ADMIN — METFORMIN HYDROCHLORIDE 500 MG: 500 TABLET, FILM COATED ORAL at 17:20

## 2018-01-07 ASSESSMENT — ENCOUNTER SYMPTOMS
DIAPHORESIS: 0
PALPITATIONS: 0
SPUTUM PRODUCTION: 0
CHILLS: 0
VOMITING: 0
DIZZINESS: 0
HEADACHES: 0
ORTHOPNEA: 1
ABDOMINAL PAIN: 0
FEVER: 0
MYALGIAS: 0
SHORTNESS OF BREATH: 1
STRIDOR: 0
COUGH: 1

## 2018-01-07 ASSESSMENT — LIFESTYLE VARIABLES
EVER_SMOKED: YES
DO YOU DRINK ALCOHOL: NO
ALCOHOL_USE: NO

## 2018-01-07 ASSESSMENT — PATIENT HEALTH QUESTIONNAIRE - PHQ9
SUM OF ALL RESPONSES TO PHQ9 QUESTIONS 1 AND 2: 0
1. LITTLE INTEREST OR PLEASURE IN DOING THINGS: NOT AT ALL
1. LITTLE INTEREST OR PLEASURE IN DOING THINGS: NOT AT ALL
2. FEELING DOWN, DEPRESSED, IRRITABLE, OR HOPELESS: NOT AT ALL
SUM OF ALL RESPONSES TO PHQ9 QUESTIONS 1 AND 2: 0
SUM OF ALL RESPONSES TO PHQ QUESTIONS 1-9: 0
SUM OF ALL RESPONSES TO PHQ QUESTIONS 1-9: 0
2. FEELING DOWN, DEPRESSED, IRRITABLE, OR HOPELESS: NOT AT ALL

## 2018-01-07 ASSESSMENT — PAIN SCALES - GENERAL
PAINLEVEL_OUTOF10: 5
PAINLEVEL_OUTOF10: 5
PAINLEVEL_OUTOF10: 0
PAINLEVEL_OUTOF10: 0
PAINLEVEL_OUTOF10: 3
PAINLEVEL_OUTOF10: 5

## 2018-01-07 ASSESSMENT — COPD QUESTIONNAIRES
DURING THE PAST 4 WEEKS HOW MUCH DID YOU FEEL SHORT OF BREATH: SOME OF THE TIME
COPD SCREENING SCORE: 6
HAVE YOU SMOKED AT LEAST 100 CIGARETTES IN YOUR ENTIRE LIFE: YES
DO YOU EVER COUGH UP ANY MUCUS OR PHLEGM?: YES, A FEW DAYS A WEEK OR MONTH

## 2018-01-07 NOTE — ED PROVIDER NOTES
ED Provider Note    Scribed for Killian Courtney M.D. by Saul Torres. 1/7/2018,  8:25 AM.    CHIEF COMPLAINT  Chief Complaint   Patient presents with   • Shortness of Breath     Pt. states increasing shortness of breath since 12/30. Pt. states hx of CHF. Pt's lungs are diminished in the bases. Pt. staes having a productive cough with sob. Pt. states muscle soreness from excessive coughin. Pt's family states that pt. has been more tired than normal lately. Pt. is drowsy in triage by A&O x 4.       HPI  Isatu Dang is a 67 y.o. female with a history of CHF who presents to the Emergency Department complaining gradually worsening shortness of breath for the last 7 days. She reports associated productive cough, abdominal pain, alternating constipation and diarrhea. Patient states that she was recently in [facility], after which her shortness of breath began. She currently uses 2 L of oxygen at home as needed. Patient denies fever, chills, vomiting.     REVIEW OF SYSTEMS  See HPI for further details. All other systems are negative.   C.    PAST MEDICAL HISTORY   has a past medical history of ACC/AHA stage C systolic heart failure (CMS-HCC) (11/8/2017); Arrhythmia; Bowel habit changes; Cancer (CMS-HCC) (2012); Cardiac arrest (CMS-HCC) (1998); CATARACT; Congestive heart failure (CHF) (CMS-HCC); Dental disorder; Depression; Diabetes (CMS-[facility]); Dilated cardiomyopathy; Hypercholesteremia; Hypertension (1998); Indigestion; Joint replacement; LBBB (left bundle branch block); Lung cancer (CMS-HCC) (2012); Myocardial infarct (1998); Neuropathy; Obesity; Pain (2011); Peripheral neuropathy (CMS-[facility]); Renal cyst, acquired, left ( ); Sleep apnea; Unspecified hemorrhagic conditions; and Ventricular tachycardia (CMS-[facility]).    SOCIAL HISTORY  Social History     Social History Main Topics   • Smoking status: Former Smoker     Packs/day: 2.00     Years: 30.00     Types: Cigarettes     Quit date: 10/20/1998   • Smokeless tobacco: Never  Used   • Alcohol use 0.0 - 1.2 oz/week      Comment: occasional    • Drug use: No   • Sexual activity: Not Currently     History   Drug Use No       SURGICAL HISTORY   has a past surgical history that includes hysterectomy, total abdominal (1995); implantable cardioverter defibrillator (icd) (4/1/98, 03/2007, 10/2011); carpal tunnel release (Right, 05/07); carpal tunnel release (4/3/08); lipoma excision (4/3/08); recovery (10/21/2011); hysterectomy, total abdominal; tonsillectomy and adenoidectomy; neck mass excision (5/24/2012); bone marrow aspiration (6/22/2012); bone marrow biopsy, ndl/trocar (6/22/2012); recovery (7/23/2012); thoracoscopy robotic (8/27/2012); laryngoscopy (11/14/2013); biopsy general (11/14/2013); and aicd battery change (November 2016).    CURRENT MEDICATIONS  Current Outpatient Prescriptions:   •  citalopram (CELEXA) 20 MG Tab, , Disp: , Rfl:   •  zolpidem (AMBIEN) 10 MG Tab, TAKE ONE TABLET BY MOUTH AT BEDTIME, as needed prn insomnia, Disp: 90 Tab, Rfl: 1  •  carvedilol (COREG) 6.25 MG Tab, Take 1 Tab by mouth 2 times a day, with meals., Disp: 180 Tab, Rfl: 3  •  spironolactone (ALDACTONE) 25 MG Tab, Take 0.5 Tabs by mouth every day., Disp: 45 Tab, Rfl: 3  •  digoxin (LANOXIN) 125 MCG Tab, Take 1 Tab by mouth every day., Disp: 90 Tab, Rfl: 3  •  torsemide (DEMADEX) 20 MG Tab, Take 2 Tabs by mouth every day., Disp: 60 Tab, Rfl: 0  •  metformin (GLUCOPHAGE) 500 MG Tab, Take 1 Tab by mouth 2 times a day, with meals., Disp: 60 Tab, Rfl: 2  •  sacubitril-valsartan (ENTRESTO) 24-26 MG Tab tablet, Take 1 Tab by mouth 2 Times a Day., Disp: 180 Tab, Rfl: 3  •  albuterol (PROAIR HFA) 108 (90 BASE) MCG/ACT Aero Soln inhalation aerosol, Inhale 2 Puffs by mouth every 6 hours as needed for Shortness of Breath., Disp: 3 Inhaler, Rfl: 3  •  Tiotropium Bromide Monohydrate (SPIRIVA RESPIMAT) 2.5 MCG/ACT Aero Soln, Inhale 2 Inhalation by mouth every day. Assemble and prime., Disp: 3 Inhaler, Rfl: 3  •   "Cyanocobalamin (VITAMIN B-12 SL), Place 1 Tab under tongue every day., Disp: , Rfl:   •  aspirin (ASA) 325 MG TABS, Take 325 mg by mouth every bedtime., Disp: , Rfl:   •  Multiple Vitamin (MULTI-VITAMINS) TABS, Take 1 Tab by mouth every day., Disp: , Rfl:   •  Calcium Carbonate-Vitamin D (CALCIUM 600+D) 600-200 MG-UNIT TABS, Take 2 Tabs by mouth every day., Disp: , Rfl:   •  allopurinol (ZYLOPRIM) 300 MG TABS, Take 300 mg by mouth every day., Disp: , Rfl:   •  gabapentin (NEURONTIN) 300 MG CAPS, Take 600 mg by mouth 3 times a day., Disp: , Rfl:     ALLERGIES  Allergies   Allergen Reactions   • Tape Contact Dermatitis   • Ace Inhibitors Cough     cough       PHYSICAL EXAM  VITAL SIGNS: /58   Pulse (!) 108   Temp 36.2 °C (97.1 °F)   Resp 20   Ht 1.575 m (5' 2\")   Wt 69.4 kg (153 lb)   LMP 11/05/1994   SpO2 93%   BMI 27.98 kg/m²   Pulse ox interpretation: Low perfusion in the low 90s that drops to the high 80s on mild exertion.  Constitutional: Alert in no apparent distress. Drowsy appearing.   HENT: No signs of trauma, Bilateral external ears normal, Nose normal.   Eyes: Conjunctiva normal, Non-icteric.   Neck: Normal range of motion, Supple, No stridor.   Lymphatic: No lymphadenopathy noted.   Cardiovascular: Regular tachycardia and regular rhythm, no murmurs.   Thorax & Lungs: No respiratory distress, No wheezing, No chest tenderness. Globally diminished lung sounds with crackles up tot he mid lung fields.   Abdomen: Bowel sounds normal, Soft, No tenderness, No masses, No pulsatile masses. No peritoneal signs.  Skin: Warm, Dry, No erythema, No rash.   Back: No midline bony tenderness.   Extremities: Intact distal pulses, No edema, No cyanosis.  Musculoskeletal: Good range of motion in all major joints. No or major deformities noted.   Neurologic: Alert , Normal motor function, Normal sensory function, No focal deficits noted.   Psychiatric: Affect normal, Judgment normal, Mood normal.     DIAGNOSTIC " STUDIES / PROCEDURES    EKG  Interpreted by me    Rhythm:  Atrial sensed ventricular paced rhythm   Rate: 122  Axis: normal  Intervals: normal  Ectopy: none  Conduction: Non specific IVCD.   ST Segments: no acute change  T Waves: no acute change  Q Waves: none  Old EKG from 11/5/16 shows no ischemic changes..    LABS  Labs Reviewed   CBC WITH DIFFERENTIAL - Abnormal; Notable for the following:        Result Value    WBC 11.5 (*)     Hematocrit 47.9 (*)     MCHC 33.4 (*)     RDW 52.7 (*)     Neutrophils-Polys 76.60 (*)     Lymphocytes 11.70 (*)     Neutrophils (Absolute) 8.77 (*)     Eos (Absolute) 0.53 (*)     All other components within normal limits    Narrative:     Indicate which anticoagulants the patient is on:->UNKNOWN   COMP METABOLIC PANEL - Abnormal; Notable for the following:     Glucose 145 (*)     All other components within normal limits    Narrative:     Indicate which anticoagulants the patient is on:->UNKNOWN   BTYPE NATRIURETIC PEPTIDE - Abnormal; Notable for the following:     B Natriuretic Peptide 3092 (*)     All other components within normal limits    Narrative:     Indicate which anticoagulants the patient is on:->UNKNOWN   APTT - Abnormal; Notable for the following:     APTT 38.3 (*)     All other components within normal limits    Narrative:     Indicate which anticoagulants the patient is on:->UNKNOWN   TROPONIN - Abnormal; Notable for the following:     Troponin I 0.05 (*)     All other components within normal limits    Narrative:     Indicate which anticoagulants the patient is on:->UNKNOWN   VENOUS BLOOD GAS - Abnormal; Notable for the following:     Venous Bg Pco2 53.3 (*)     Venous Bg Hco3 30 (*)     All other components within normal limits   ESTIMATED GFR - Abnormal; Notable for the following:     GFR If  55 (*)     GFR If Non  46 (*)     All other components within normal limits    Narrative:     Indicate which anticoagulants the patient is  on:->UNKNOWN   PROTHROMBIN TIME    Narrative:     Indicate which anticoagulants the patient is on:->UNKNOWN   LIPASE    Narrative:     Indicate which anticoagulants the patient is on:->UNKNOWN   MAGNESIUM    Narrative:     Indicate which anticoagulants the patient is on:->UNKNOWN   PHOSPHORUS    Narrative:     Indicate which anticoagulants the patient is on:->UNKNOWN   All labs reviewed by me.    RADIOLOGY  DX-CHEST-PORTABLE (1 VIEW)   Final Result      1.  Stable cardiomegaly      2.  Interval development in bilateral perihilar and bibasilar opacities are most consistent with pulmonary edema. Atypical infection could have a similar appearance.      The radiologist's interpretation of all radiological studies have been reviewed by me.    COURSE & MEDICAL DECISION MAKING  Nursing notes, VS, PMSFHx reviewed in chart.     8:25 AM Patient seen and examined at bedside. Patient's SPO2 drops to 88% when moved for physical exam. Discussed exacerbation of her CHF with some viral illnesses. I informed her that she will likely be admitted secondary to her inability to perfuse normally. Differential diagnosis includes but is not limited to CHF exacerbation, pneumonia, URI, altered mental status from CO2 retention. Ordered for DX chest, Venous blood gas, Magnesium, Phosphorus, CBC with differential, CMP, BNP, PT/INR, APTT, Lipase, Troponin to evaluate. Patient will be treated with NS continuous for fluid resuscitation for her symptoms.     9:22 AM - This patient's blood gas does not show evidence of severe CO2 retention.    9:44 AM - Paged hospitalist. This patient's chest x-ray shows significant pulmonary edema, and her severely elevated BNP is consistent with a CHF exacerbation. I returned to the bedside, the patient no longer appears drowsy. She is agreeable to admission. This patient does have a very slight troponin elevation, but I suspect this is due to increased work of breathing and mild resultant cardiac strain, but  there is no evidence of acute coronary syndrome.    10:02 AM I discussed the patient with Dr. Osorio, the hospitalist, who agrees to admit her for further evaluation and treatment.      DISPOSITION:  Patient will be admitted to hospital in guarded condition.      FINAL IMPRESSION  1. CHF exacerbation  2. Elevated troponin     I, Saul Torres (Scribe), am scribing for, and in the presence of, Killian Courtney M.D..    Electronically signed by: Saul Torres (Scribe), 1/7/2018    I, Killian Courtney M.D. personally performed the services described in this documentation, as scribed by Saul Torres in my presence, and it is both accurate and complete.    The note accurately reflects work and decisions made by me.  [place] Courtney  1/7/2018  10:03 AM

## 2018-01-07 NOTE — ED NOTES
"Isatu Dang  67 y.o. female  Chief Complaint   Patient presents with   • Shortness of Breath     Pt. states increasing shortness of breath since 12/30. Pt. states hx of CHF. Pt's lungs are diminished in the bases. Pt. staes having a productive cough with sob. Pt. states muscle soreness from excessive coughin. Pt's family states that pt. has been more tired than normal lately. Pt. is drowsy in triage by A&O x 4.     /58   Pulse (!) 108   Temp 36.2 °C (97.1 °F)   Resp 20   Ht 1.575 m (5' 2\")   Wt 69.4 kg (153 lb)   LMP 11/05/1994   SpO2 93%   BMI 27.98 kg/m²     Pt amb to triage via wheelchair. Pt. 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 Last treatment for cancer was over two years ago. Pt. States she did receive a flu vaccine.  Pt is alert and oriented, speaking in full sentences, follows commands and responds appropriately to questions. NAD. Resp are even and unlabored.  Pt placed in lobby. Pt educated on triage process. Pt encouraged to alert staff for any changes.  "

## 2018-01-07 NOTE — ED NOTES
Pt ambulates to restroom without assistance or oxygen, denies dizziness or shortness of breath.  Pt returned to room in no distress, back on cardiac monitor and oxygen.

## 2018-01-07 NOTE — PROGRESS NOTES
2 RN skin check complete. A few small red marks on patient's posterior scalp, no open sores. No other abnormalities found.

## 2018-01-07 NOTE — H&P
Hospital Medicine History and Physical    Date of Service  1/7/2018    Chief Complaint  Chief Complaint   Patient presents with   • Shortness of Breath     Pt. states increasing shortness of breath since 12/30. Pt. states hx of CHF. Pt's lungs are diminished in the bases. Pt. staes having a productive cough with sob. Pt. states muscle soreness from excessive coughin. Pt's family states that pt. has been more tired than normal lately. Pt. is drowsy in triage by A&O x 4.       History of Presenting Illness  67 y.o. female who presented 1/7/2018 with shortness of breath for 10 days. She states it is getting progressively worse and she is more short of breath with laying flat or movement. She denies any fevers but does have a cough productive of clear sputum. She has some leg swelling and states this feels similar to heart failure episodes in the past. She has been taking her cardiac medications as prescribed but does admit to not eating a low sodium diet as well as she should due to the holidays. A 10 point review of systems is reviewed and otherwise negative.    Primary Care Physician  Edwin Urias M.D.    Code Status  Full    Review of Systems  Review of Systems   Constitutional: Negative for chills, diaphoresis and fever.   HENT: Negative for congestion.    Respiratory: Positive for cough and shortness of breath. Negative for sputum production and stridor.         More short of breath with movement   Cardiovascular: Positive for orthopnea and leg swelling. Negative for chest pain and palpitations.   Gastrointestinal: Negative for abdominal pain and vomiting.   Genitourinary: Negative for dysuria and frequency.   Musculoskeletal: Negative for myalgias.   Skin: Negative for rash.   Neurological: Negative for dizziness and headaches.          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   • CATARACT     Beginning stages no surgery yet   • Congestive heart failure (CHF) (CMS-HCC)    • Dental disorder     Upper/Lower dentures.   • Depression    • Diabetes (CMS-HCC)    • Dilated cardiomyopathy    • Hypercholesteremia    • Indigestion    • Joint replacement         • LBBB (left bundle branch block)    • Neuropathy    • Obesity    • Peripheral neuropathy (CMS-HCC)    • Renal cyst, acquired, left      History of benign cystic mass on the lower left kidney.   • Sleep apnea    • Unspecified hemorrhagic conditions     from aspirin   • Ventricular tachycardia (CMS-HCC)        Surgical History  Past Surgical History:   Procedure Laterality Date   • AICD BATTERY CHANGE  November 2016    Generator replacement with Medtronic Viva S CRT-D [id number] implanted by Dr. Alexis. Original device implanted in 1998   • LARYNGOSCOPY  11/14/2013    Performed by Judson Willis M.D. at SURGERY SAME DAY [facility]   • BIOPSY GENERAL  11/14/2013    Performed by Judson Willis M.D. at SURGERY SAME DAY [facility]   • THORACOSCOPY ROBOTIC  8/27/2012    Performed by GANSER, JOHN H at SURGERY [facility]   • RECOVERY  7/23/2012    Performed by SURGERY, IR-RECOVERY at SURGERY SAME DAY [facility]   • BONE MARROW ASPIRATION  6/22/2012    Performed by ISAIAS EVERETT at ENDOSCOPY [facility]S   • BONE MARROW BIOPSY, NDL/TROCAR  6/22/2012    Performed by ISAIAS EVERETT at ENDOSCOPY [facility] ORS   • NECK MASS EXCISION  5/24/2012    Performed by JUDSON WILLIS at SURGERY SAME DAY [facility]   • RECOVERY  10/21/2011    Performed by SURGERY, CATH-RECOVERY at SURGERY SAME DAY [facility]   • CARPAL TUNNEL RELEASE  4/3/08    Performed by DEEP GREEN at  SURGERY [facility]   • LIPOMA EXCISION  4/3/08    Performed by DEEP GREEN at SURGERY [facility]   • CARPAL TUNNEL RELEASE Right 05/07        • HYSTERECTOMY, TOTAL ABDOMINAL  1995   • HYSTERECTOMY, TOTAL ABDOMINAL          • IMPLANTABLE CARDIOVERTER DEFIBRILLATOR (ICD)  4/1/98, 03/2007, 10/2011   • TONSILLECTOMY AND ADENOIDECTOMY              Medications    Current Facility-Administered Medications:   •  NS infusion, , Intravenous, Continuous, Killian Courtney M.D., Last Rate: 125 mL/hr at 01/07/18 0845  •  albuterol inhaler 2 Puff, 2 Puff, Inhalation, Q6HRS PRN, Kimber Osorio M.D.  •  allopurinol (ZYLOPRIM) tablet 300 mg, 300 mg, Oral, DAILY, Kimber Osorio M.D.  •  aspirin (ASA) tablet 325 mg, 325 mg, Oral, QHS, Kimber Osorio M.D.  •  Calcium Carbonate-Vitamin D 600-200 MG-UNIT TABS 2 Tab, 2 Tab, Oral, DAILY, Kimber Osorio M.D.  •  carvedilol (COREG) tablet 6.25 mg, 6.25 mg, Oral, BID WITH MEALS, Kimber Osorio M.D.  •  citalopram (CELEXA) tablet 20 mg, 20 mg, Oral, DAILY, Kimber Osorio M.D.  •  digoxin (LANOXIN) tablet 125 mcg, 125 mcg, Oral, DAILY, Kimber Osorio M.D.  •  gabapentin (NEURONTIN) capsule 600 mg, 600 mg, Oral, TID, Kimber Oosrio M.D.  •  metformin (GLUCOPHAGE) tablet 500 mg, 500 mg, Oral, BID WITH MEALS, Kimber Osorio M.D.  •  sacubitril-valsartan (ENTRESTO) 24-26 MG tablet 1 Tab, 1 Tab, Oral, BID, Kimber Osorio M.D.  •  spironolactone (ALDACTONE) tablet 12.5 mg, 12.5 mg, Oral, DAILY, Kimber Osorio M.D.  •  tiotropium (SPIRIVA) 18 MCG inhalation capsule 1 Cap, 1 Cap, Inhalation, QDAILY (RT), Kimber Osorio M.D.  •  zolpidem (AMBIEN) tablet 10 mg, 10 mg, Oral, HS PRN, Kimber Osorio M.D.  •  senna-docusate (PERICOLACE or SENOKOT S) 8.6-50 MG per tablet 2 Tab, 2 Tab, Oral, BID **AND** polyethylene glycol/lytes (MIRALAX) PACKET 1 Packet, 1 Packet, Oral, QDAY PRN **AND** magnesium hydroxide (MILK OF MAGNESIA) suspension 30 mL, 30 mL, Oral, QDAY PRN **AND**  bisacodyl (DULCOLAX) suppository 10 mg, 10 mg, Rectal, QDAY PRN, Kimber Osorio M.D.  •  Respiratory Care per Protocol, , Nebulization, Continuous RT, Kimber Osorio M.D.  •  enoxaparin (LOVENOX) inj 40 mg, 40 mg, Subcutaneous, DAILY, Kimber Osorio M.D.  •  acetaminophen (TYLENOL) tablet 650 mg, 650 mg, Oral, Q6HRS PRN, Kimber Osorio M.D.  •  furosemide (LASIX) injection 40 mg, 40 mg, Intravenous, BID DIURETIC, Kimber Osorio M.D.  •  ondansetron (ZOFRAN) syringe/vial injection 4 mg, 4 mg, Intravenous, Q4HRS PRN, Kimber Osorio M.D.  •  ondansetron (ZOFRAN ODT) dispertab 4 mg, 4 mg, Oral, Q4HRS PRN, Kimber Osorio M.D.  •  insulin regular (HUMULIN R) injection 2-9 Units, 2-9 Units, Subcutaneous, 4X/DAY ACHS **AND** Accu-Chek ACHS, , , Q AC AND BEDTIME(S) **AND** NOTIFY MD and PharmD, , , Once **AND** glucose 4 g chewable tablet 16 g, 16 g, Oral, Q15 MIN PRN **AND** dextrose 50% (D50W) injection 25 mL, 25 mL, Intravenous, Q15 MIN PRN, Kimber Osorio M.D.    Current Outpatient Prescriptions:   •  citalopram (CELEXA) 20 MG Tab, , Disp: , Rfl:   •  zolpidem (AMBIEN) 10 MG Tab, TAKE ONE TABLET BY MOUTH AT BEDTIME, as needed prn insomnia, Disp: 90 Tab, Rfl: 1  •  carvedilol (COREG) 6.25 MG Tab, Take 1 Tab by mouth 2 times a day, with meals., Disp: 180 Tab, Rfl: 3  •  spironolactone (ALDACTONE) 25 MG Tab, Take 0.5 Tabs by mouth every day., Disp: 45 Tab, Rfl: 3  •  digoxin (LANOXIN) 125 MCG Tab, Take 1 Tab by mouth every day., Disp: 90 Tab, Rfl: 3  •  torsemide (DEMADEX) 20 MG Tab, Take 2 Tabs by mouth every day., Disp: 60 Tab, Rfl: 0  •  metformin (GLUCOPHAGE) 500 MG Tab, Take 1 Tab by mouth 2 times a day, with meals., Disp: 60 Tab, Rfl: 2  •  sacubitril-valsartan (ENTRESTO) 24-26 MG Tab tablet, Take 1 Tab by mouth 2 Times a Day., Disp: 180 Tab, Rfl: 3  •  albuterol (PROAIR HFA) 108 (90 BASE) MCG/ACT Aero Soln inhalation aerosol, Inhale 2 Puffs by mouth every 6 hours as needed for Shortness of Breath., Disp: 3  Inhaler, Rfl: 3  •  Tiotropium Bromide Monohydrate (SPIRIVA RESPIMAT) 2.5 MCG/ACT Aero Soln, Inhale 2 Inhalation by mouth every day. Assemble and prime., Disp: 3 Inhaler, Rfl: 3  •  Cyanocobalamin (VITAMIN B-12 SL), Place 1 Tab under tongue every day., Disp: , Rfl:   •  aspirin (ASA) 325 MG TABS, Take 325 mg by mouth every bedtime., Disp: , Rfl:   •  Multiple Vitamin (MULTI-VITAMINS) TABS, Take 1 Tab by mouth every day., Disp: , Rfl:   •  Calcium Carbonate-Vitamin D (CALCIUM 600+D) 600-200 MG-UNIT TABS, Take 2 Tabs by mouth every day., Disp: , Rfl:   •  allopurinol (ZYLOPRIM) 300 MG TABS, Take 300 mg by mouth every day., Disp: , Rfl:   •  gabapentin (NEURONTIN) 300 MG CAPS, Take 600 mg by mouth 3 times a day., Disp: , Rfl:     Family History  Family History   Problem Relation Age of Onset   • Diabetes Mother    • Cancer Mother    • Diabetes Sister    • Diabetes Maternal Grandmother        Social History  Social History   Substance Use Topics   • Smoking status: Former Smoker     Packs/day: 2.00     Years: 30.00     Types: Cigarettes     Quit date: 10/20/1998   • Smokeless tobacco: Never Used   • Alcohol use 0.0 - 1.2 oz/week      Comment: occasional        Allergies  Allergies   Allergen Reactions   • Tape Contact Dermatitis   • Ace Inhibitors Cough     cough        Physical Exam  Laboratory   Hemodynamics  Temp (24hrs), Av.2 °C (97.1 °F), Min:36.2 °C (97.1 °F), Max:36.2 °C (97.1 °F)   Temperature: 36.2 °C (97.1 °F)  Pulse  Av  Min: 108  Max: 117 Heart Rate (Monitored): (!) 114  Blood Pressure : 102/58, NIBP: 104/65      Respiratory      Respiration: (!) 36, Pulse Oximetry: 97 %        RUL Breath Sounds: Clear, RML Breath Sounds: Clear, RLL Breath Sounds: Diminished, SHUBHAM Breath Sounds: Clear, LLL Breath Sounds: Diminished    Physical Exam   Constitutional: She appears distressed.   HENT:   Mouth/Throat: Oropharynx is clear and moist.   Eyes: Conjunctivae are normal. No scleral icterus.   Neck: Neck supple.  No tracheal deviation present.   Cardiovascular: Normal rate and regular rhythm.  PMI is displaced.    Pulmonary/Chest: Accessory muscle usage present. No stridor. Tachypnea noted. She has decreased breath sounds. She has rales.   Abdominal: Bowel sounds are normal. She exhibits no distension. There is no tenderness.   Musculoskeletal: She exhibits edema.   Neurological: She is alert. Coordination normal.   Skin: Skin is warm and dry. She is not diaphoretic.   Psychiatric: Her behavior is normal.   Nursing note and vitals reviewed.      Recent Labs      01/07/18   0855   WBC  11.5*   RBC  5.16   HEMOGLOBIN  16.0   HEMATOCRIT  47.9*   MCV  92.8   MCH  31.0   MCHC  33.4*   RDW  52.7*   PLATELETCT  269   MPV  11.0     Recent Labs      01/07/18   0855   SODIUM  139   POTASSIUM  4.9   CHLORIDE  101   CO2  28   GLUCOSE  145*   BUN  22   CREATININE  1.17   CALCIUM  10.2     Recent Labs      01/07/18   0855   ALTSGPT  21   ASTSGOT  38   ALKPHOSPHAT  56   TBILIRUBIN  0.9   LIPASE  28   GLUCOSE  145*     Recent Labs      01/07/18   0855   APTT  38.3*   INR  1.05     Recent Labs      01/07/18   0855   BNPBTYPENAT  3092*           Imaging  Chest xray reviewed by myself shows pulmonary edema   Assessment/Plan     I anticipate this patient will require at least two midnights for appropriate medical management, necessitating inpatient admission.    Acute on chronic respiratory failure (CMS-HCC)   Assessment & Plan    I will order respiratory therapy, continue supplemental oxygen and taper as tolerated to her home dose  Chest xray will be repeated tomorrow to ensure clearing of edema        Acute on chronic systolic heart failure (CMS-HCC)   Assessment & Plan    Will treat with aggressive diuresis  I will continue her home coreg, digoxin and spironolactone  Echocardiogram shows ejection fraction of 25% from Dec 2017, I will not repeat one at this time  I will check serial troponin enzymes to ensure  No active ischemia             VTE prophylaxis:lovenox.

## 2018-01-07 NOTE — ASSESSMENT & PLAN NOTE
Likely due to CHF with elevated BNP, continue lasix, b/l thoracentesis today  Discussed with Dr. Dillon

## 2018-01-07 NOTE — ASSESSMENT & PLAN NOTE
BNP increased to 4973, CXR with edema and b/i pleural effusions despite Lasix therapy, its difficult to get fluid off since kidney don't react well, now evolving NELSON and hyponatremia  > echo with ef 25%  >Coreg 6.25mg--> metoprolol 12.5mg bid due to symptomatic hypotension and fatigue     This is concerning as she is having worsening low output heart failure despite euvolemia and adequate diuresis. Patient remains in guarded condition. Poor prognostic sign.  > palliative discussed with patient in detail about GOC, patient now DNR    - there is very little or no clinical improvement given her severe heart failure and now kidneys not tolerating diurese, along with marginal Bp, will opt for B/L thoracentesis

## 2018-01-07 NOTE — ED NOTES
Pt placed in room 64, pt lethargic and placed on oxygen.  Reports shortness of breath for a week with brown sputum.  Pt speaking in full sentences

## 2018-01-08 ENCOUNTER — PATIENT OUTREACH (OUTPATIENT)
Dept: HEALTH INFORMATION MANAGEMENT | Facility: OTHER | Age: 68
End: 2018-01-08

## 2018-01-08 ENCOUNTER — APPOINTMENT (OUTPATIENT)
Dept: RADIOLOGY | Facility: MEDICAL CENTER | Age: 68
DRG: 291 | End: 2018-01-08
Attending: INTERNAL MEDICINE
Payer: MEDICARE

## 2018-01-08 LAB
ANION GAP SERPL CALC-SCNC: 8 MMOL/L (ref 0–11.9)
BASOPHILS # BLD AUTO: 0.7 % (ref 0–1.8)
BASOPHILS # BLD: 0.06 K/UL (ref 0–0.12)
BUN SERPL-MCNC: 26 MG/DL (ref 8–22)
CALCIUM SERPL-MCNC: 9.2 MG/DL (ref 8.5–10.5)
CHLORIDE SERPL-SCNC: 100 MMOL/L (ref 96–112)
CO2 SERPL-SCNC: 27 MMOL/L (ref 20–33)
CREAT SERPL-MCNC: 0.87 MG/DL (ref 0.5–1.4)
EOSINOPHIL # BLD AUTO: 0.19 K/UL (ref 0–0.51)
EOSINOPHIL NFR BLD: 2.2 % (ref 0–6.9)
ERYTHROCYTE [DISTWIDTH] IN BLOOD BY AUTOMATED COUNT: 51.1 FL (ref 35.9–50)
GFR SERPL CREATININE-BSD FRML MDRD: >60 ML/MIN/1.73 M 2
GLUCOSE BLD-MCNC: 104 MG/DL (ref 65–99)
GLUCOSE BLD-MCNC: 113 MG/DL (ref 65–99)
GLUCOSE BLD-MCNC: 137 MG/DL (ref 65–99)
GLUCOSE BLD-MCNC: 143 MG/DL (ref 65–99)
GLUCOSE SERPL-MCNC: 117 MG/DL (ref 65–99)
HCT VFR BLD AUTO: 41.5 % (ref 37–47)
HGB BLD-MCNC: 14.1 G/DL (ref 12–16)
IMM GRANULOCYTES # BLD AUTO: 0.03 K/UL (ref 0–0.11)
IMM GRANULOCYTES NFR BLD AUTO: 0.3 % (ref 0–0.9)
LYMPHOCYTES # BLD AUTO: 1.02 K/UL (ref 1–4.8)
LYMPHOCYTES NFR BLD: 11.6 % (ref 22–41)
MCH RBC QN AUTO: 31.1 PG (ref 27–33)
MCHC RBC AUTO-ENTMCNC: 34 G/DL (ref 33.6–35)
MCV RBC AUTO: 91.6 FL (ref 81.4–97.8)
MONOCYTES # BLD AUTO: 0.58 K/UL (ref 0–0.85)
MONOCYTES NFR BLD AUTO: 6.6 % (ref 0–13.4)
NEUTROPHILS # BLD AUTO: 6.89 K/UL (ref 2–7.15)
NEUTROPHILS NFR BLD: 78.6 % (ref 44–72)
NRBC # BLD AUTO: 0 K/UL
NRBC BLD-RTO: 0 /100 WBC
PLATELET # BLD AUTO: 190 K/UL (ref 164–446)
PMV BLD AUTO: 11.4 FL (ref 9–12.9)
POTASSIUM SERPL-SCNC: 4.1 MMOL/L (ref 3.6–5.5)
RBC # BLD AUTO: 4.53 M/UL (ref 4.2–5.4)
SODIUM SERPL-SCNC: 135 MMOL/L (ref 135–145)
WBC # BLD AUTO: 8.8 K/UL (ref 4.8–10.8)

## 2018-01-08 PROCEDURE — A9270 NON-COVERED ITEM OR SERVICE: HCPCS | Performed by: HOSPITALIST

## 2018-01-08 PROCEDURE — 700102 HCHG RX REV CODE 250 W/ 637 OVERRIDE(OP): Performed by: INTERNAL MEDICINE

## 2018-01-08 PROCEDURE — 97161 PT EVAL LOW COMPLEX 20 MIN: CPT

## 2018-01-08 PROCEDURE — 71046 X-RAY EXAM CHEST 2 VIEWS: CPT

## 2018-01-08 PROCEDURE — G8978 MOBILITY CURRENT STATUS: HCPCS | Mod: CI

## 2018-01-08 PROCEDURE — 770020 HCHG ROOM/CARE - TELE (206)

## 2018-01-08 PROCEDURE — 85025 COMPLETE CBC W/AUTO DIFF WBC: CPT

## 2018-01-08 PROCEDURE — 80048 BASIC METABOLIC PNL TOTAL CA: CPT

## 2018-01-08 PROCEDURE — 36415 COLL VENOUS BLD VENIPUNCTURE: CPT

## 2018-01-08 PROCEDURE — 82962 GLUCOSE BLOOD TEST: CPT | Mod: 91

## 2018-01-08 PROCEDURE — 700102 HCHG RX REV CODE 250 W/ 637 OVERRIDE(OP): Performed by: HOSPITALIST

## 2018-01-08 PROCEDURE — G8980 MOBILITY D/C STATUS: HCPCS | Mod: CI

## 2018-01-08 PROCEDURE — 700111 HCHG RX REV CODE 636 W/ 250 OVERRIDE (IP): Performed by: INTERNAL MEDICINE

## 2018-01-08 PROCEDURE — 700111 HCHG RX REV CODE 636 W/ 250 OVERRIDE (IP): Performed by: HOSPITALIST

## 2018-01-08 PROCEDURE — G8979 MOBILITY GOAL STATUS: HCPCS | Mod: CI

## 2018-01-08 PROCEDURE — 99233 SBSQ HOSP IP/OBS HIGH 50: CPT | Performed by: HOSPITALIST

## 2018-01-08 PROCEDURE — A9270 NON-COVERED ITEM OR SERVICE: HCPCS | Performed by: INTERNAL MEDICINE

## 2018-01-08 RX ORDER — CARVEDILOL 3.12 MG/1
3.12 TABLET ORAL 2 TIMES DAILY WITH MEALS
Status: DISCONTINUED | OUTPATIENT
Start: 2018-01-08 | End: 2018-01-10

## 2018-01-08 RX ADMIN — ZOLPIDEM TARTRATE 10 MG: 5 TABLET, FILM COATED ORAL at 21:28

## 2018-01-08 RX ADMIN — SACUBITRIL AND VALSARTAN 1 TABLET: 24; 26 TABLET, FILM COATED ORAL at 09:48

## 2018-01-08 RX ADMIN — METFORMIN HYDROCHLORIDE 500 MG: 500 TABLET, FILM COATED ORAL at 17:31

## 2018-01-08 RX ADMIN — ALLOPURINOL 300 MG: 300 TABLET ORAL at 09:47

## 2018-01-08 RX ADMIN — GABAPENTIN 600 MG: 300 CAPSULE ORAL at 09:47

## 2018-01-08 RX ADMIN — ENOXAPARIN SODIUM 40 MG: 100 INJECTION SUBCUTANEOUS at 09:47

## 2018-01-08 RX ADMIN — METFORMIN HYDROCHLORIDE 500 MG: 500 TABLET, FILM COATED ORAL at 09:47

## 2018-01-08 RX ADMIN — CARVEDILOL 3.12 MG: 3.12 TABLET, FILM COATED ORAL at 17:32

## 2018-01-08 RX ADMIN — SACUBITRIL AND VALSARTAN 1 TABLET: 24; 26 TABLET, FILM COATED ORAL at 20:33

## 2018-01-08 RX ADMIN — FUROSEMIDE 40 MG: 10 INJECTION, SOLUTION INTRAMUSCULAR; INTRAVENOUS at 09:44

## 2018-01-08 RX ADMIN — ASPIRIN 325 MG: 325 TABLET ORAL at 20:34

## 2018-01-08 RX ADMIN — GABAPENTIN 600 MG: 300 CAPSULE ORAL at 20:34

## 2018-01-08 RX ADMIN — TIOTROPIUM BROMIDE 1 CAPSULE: 18 CAPSULE ORAL; RESPIRATORY (INHALATION) at 09:46

## 2018-01-08 RX ADMIN — Medication 500 MG: at 09:47

## 2018-01-08 RX ADMIN — FUROSEMIDE 40 MG: 10 INJECTION, SOLUTION INTRAMUSCULAR; INTRAVENOUS at 16:14

## 2018-01-08 RX ADMIN — GABAPENTIN 600 MG: 300 CAPSULE ORAL at 16:14

## 2018-01-08 ASSESSMENT — PAIN SCALES - GENERAL
PAINLEVEL_OUTOF10: 0

## 2018-01-08 ASSESSMENT — GAIT ASSESSMENTS
GAIT LEVEL OF ASSIST: SUPERVISED
DISTANCE (FEET): 100

## 2018-01-08 NOTE — PROGRESS NOTES
Isatu Cohn was admitted to [facility] on 11/5/17 for acute chronic systolic heart failure. Patient was discharged home on 11/10/17. IHD patient advocate was able to engage with patient throughout lifecycle, and was able to assist patient with getting her medications. When patient was discharged, her RX for spironolactomine was not sent to the pharmacy by the hospitalist who prescribed it. Patient Advocate was able to coordinate with the hospitalist and got patient’s RX sent to patient’s pharmacy, which was ready for pickup within an hour. Patient Advocate also sent patient RX resources for her Symbicort and Entresto. Per discharge orders, patient was instructed to see her cardiologist and PCP, post discharge. Patient was adherent to both orders, and saw her cardiologist, the [facility] discharge clinic, and pulmonologist.

## 2018-01-08 NOTE — RESPIRATORY CARE
COPD EDUCATION by COPD CLINICAL EDUCATOR  1/8/2018 at 6:50 AM by Jessica Hall     Patient reviewed by COPD education team. Patient does not qualify for COPD program.

## 2018-01-08 NOTE — CARE PLAN
Problem: Safety  Goal: Will remain free from falls  Fall precautions in place. Bed wheels locked, bed is in the lowest position. Call light in reach. Bed alarm is refused by pt. Non-skid socks provided. Patient provides successful verbalization of fall and safety precautions and demonstration of use of call bell. Upper side rails are up. Hourly rounding in progress.     Problem: Respiratory:  Goal: Respiratory status will improve  Respiratory rate and rhythm assessed and monitored. Oxygenation and saturation monitored and titrated as ordered. Patient positioned optimally. Collaboration with RT in place.

## 2018-01-08 NOTE — PROGRESS NOTES
[facility] Hospitalist Progress Note    Date of Service: 2018    Chief Complaint  67 y.o. female admitted 2018 with sob    Interval Problem Update  Sob    Cant lay flat    bp is low    Afebrile    Ef 25%    Consultants/Specialty  none    Disposition  home        ROS   Physical Exam  Laboratory/Imaging   Hemodynamics  Temp (24hrs), Av.4 °C (97.5 °F), Min:36.1 °C (96.9 °F), Max:36.8 °C (98.2 °F)   Temperature: 36.8 °C (98.2 °F)  Pulse  Av.8  Min: 76  Max: 118 Heart Rate (Monitored): (!) 114  Blood Pressure : (!) 94/60, NIBP: 111/68      Respiratory      Respiration: 14, Pulse Oximetry: 96 %, O2 Daily Delivery Respiratory : Silicone Nasal Cannula     Work Of Breathing / Effort: Mild  RUL Breath Sounds: Diminished, RML Breath Sounds: Diminished, RLL Breath Sounds: Diminished, SHUBHAM Breath Sounds: Diminished, LLL Breath Sounds: Diminished    Fluids    Intake/Output Summary (Last 24 hours) at 18 0900  Last data filed at 18 0600   Gross per 24 hour   Intake              340 ml   Output              950 ml   Net             -610 ml       Nutrition  Orders Placed This Encounter   Procedures   • Diet Order     Standing Status:   Standing     Number of Occurrences:   1     Order Specific Question:   Diet:     Answer:   2 Gram Sodium [7]     Order Specific Question:   Consistency/Fluid modifications:     Answer:   1500 ml Fluid Restriction [9]     Physical Exam   Constitutional: She is oriented to person, place, and time. No distress.   Eyes: Left eye exhibits no discharge.   Neck: No tracheal deviation present. No thyromegaly present.   Cardiovascular: Normal rate.  Exam reveals no gallop and no friction rub.    No murmur heard.  Pulmonary/Chest: She has rales (bibasilar).   Abdominal: She exhibits no distension. There is no tenderness. There is no rebound and no guarding.   Musculoskeletal: She exhibits edema (trace both legs).   Neurological: She is alert and oriented to person, place, and time. No  cranial nerve deficit. Coordination normal.   Skin: No rash noted. She is not diaphoretic. No erythema. No pallor.       Recent Labs      01/07/18   0855  01/08/18   0152   WBC  11.5*  8.8   RBC  5.16  4.53   HEMOGLOBIN  16.0  14.1   HEMATOCRIT  47.9*  41.5   MCV  92.8  91.6   MCH  31.0  31.1   MCHC  33.4*  34.0   RDW  52.7*  51.1*   PLATELETCT  269  190   MPV  11.0  11.4     Recent Labs      01/07/18   0855  01/08/18   0152   SODIUM  139  135   POTASSIUM  4.9  4.1   CHLORIDE  101  100   CO2  28  27   GLUCOSE  145*  117*   BUN  22  26*   CREATININE  1.17  0.87   CALCIUM  10.2  9.2     Recent Labs      01/07/18   0855   APTT  38.3*   INR  1.05     Recent Labs      01/07/18   0855   BNPBTYPENAT  3092*              Assessment/Plan     Acute on chronic respiratory failure (CMS-[facility])   Assessment & Plan    Diuresis with iv lasix    Oxygen prn        Acute on chronic systolic heart failure (CMS-[facility])   Assessment & Plan    Will treat with aggressive diuresis   ef 25%    Coreg 3mg bid    Lasix 40mg iv q12    aceI        DM type 2 (diabetes mellitus, type 2) (CMS-HCC)- (present on admission)   Assessment & Plan    Diabetic diet    fs with ssi    Cont metformin        check am cbc, bmp, bnp      Reece catheter::  No Reece

## 2018-01-08 NOTE — THERAPY
"Physical Therapy Evaluation completed.   Bed Mobility:  Supine to Sit: Modified Independent  Transfers: Sit to Stand: Supervised  Gait: Level Of Assist: Supervised with No Equipment Needed       Plan of Care: Patient with no further skilled PT needs in the acute care setting at this time  Discharge Recommendations: Equipment: No Equipment Needed. Post-acute therapy Currently anticipate no further skilled therapy needs once patient is discharged from the inpatient setting.    Pt is a 66 yo female presenting with mild SOB with activity but physically is near baseline and able to amb without AD, steady with no LOB. No further acute PT needs, DC PT.     See \"Rehab Therapy-Acute\" Patient Summary Report for complete documentation.     "

## 2018-01-08 NOTE — PROGRESS NOTES
"Received bedside report from PM nurse. Assumed patient care. Chart reviewed. Pt was sitting at the edge of the bed, leaning over the table. States \"it's the only way she is able to breathe.\" . A&O x 4. Discussed low BP in MD rounds. MD to adjust meds.  Patient denies pain. POC updated with pt and on the patient communication board. Bed locked and in the lowest position. Call light within reach. Tele box on. Will continue to monitor.   "

## 2018-01-08 NOTE — PROGRESS NOTES
Awake, alert, sitting up on side of bed.  at bedside getting ready to go home. HS meds given and assessment complete. Vss, pt denies pain or increased sob. Pt has cpap at bedside. Denies needing help to set it up. Plan of care discussed. No further needs noted.

## 2018-01-08 NOTE — PROGRESS NOTES
Restless but drowsy. Ambien given at bedtime.  Found laying upside down in bed then sitting up on side of bed, now laying with head at top of bed. B/p running low after hs meds. 96/63, 108/70, now 88/58. Denies dizziness. Will recheck.

## 2018-01-08 NOTE — CARE PLAN
Problem: Safety  Goal: Will remain free from falls    Intervention: Implement fall precautions  Safety measures in place. Call light in reach, skid proof socks on, bed and strip alarm activated and hourly rounding completed for pt. Needs and safety.      Problem: Knowledge Deficit  Goal: Knowledge of disease process/condition, treatment plan, diagnostic tests, and medications will improve  Outcome: PROGRESSING AS EXPECTED  Plan of care discussed and questions answered.

## 2018-01-09 LAB
ANION GAP SERPL CALC-SCNC: 8 MMOL/L (ref 0–11.9)
BNP SERPL-MCNC: 2817 PG/ML (ref 0–100)
BUN SERPL-MCNC: 27 MG/DL (ref 8–22)
CALCIUM SERPL-MCNC: 9.5 MG/DL (ref 8.5–10.5)
CHLORIDE SERPL-SCNC: 98 MMOL/L (ref 96–112)
CO2 SERPL-SCNC: 30 MMOL/L (ref 20–33)
CREAT SERPL-MCNC: 0.89 MG/DL (ref 0.5–1.4)
ERYTHROCYTE [DISTWIDTH] IN BLOOD BY AUTOMATED COUNT: 51.1 FL (ref 35.9–50)
GLUCOSE BLD-MCNC: 104 MG/DL (ref 65–99)
GLUCOSE BLD-MCNC: 114 MG/DL (ref 65–99)
GLUCOSE BLD-MCNC: 124 MG/DL (ref 65–99)
GLUCOSE BLD-MCNC: 125 MG/DL (ref 65–99)
GLUCOSE SERPL-MCNC: 107 MG/DL (ref 65–99)
HCT VFR BLD AUTO: 41.1 % (ref 37–47)
HGB BLD-MCNC: 14 G/DL (ref 12–16)
MAGNESIUM SERPL-MCNC: 1.8 MG/DL (ref 1.5–2.5)
MCH RBC QN AUTO: 31.2 PG (ref 27–33)
MCHC RBC AUTO-ENTMCNC: 34.1 G/DL (ref 33.6–35)
MCV RBC AUTO: 91.5 FL (ref 81.4–97.8)
PLATELET # BLD AUTO: 195 K/UL (ref 164–446)
PMV BLD AUTO: 11.1 FL (ref 9–12.9)
POTASSIUM SERPL-SCNC: 4.1 MMOL/L (ref 3.6–5.5)
POTASSIUM SERPL-SCNC: 4.2 MMOL/L (ref 3.6–5.5)
RBC # BLD AUTO: 4.49 M/UL (ref 4.2–5.4)
SODIUM SERPL-SCNC: 136 MMOL/L (ref 135–145)
TROPONIN I SERPL-MCNC: 0.05 NG/ML (ref 0–0.04)
WBC # BLD AUTO: 9 K/UL (ref 4.8–10.8)

## 2018-01-09 PROCEDURE — G8988 SELF CARE GOAL STATUS: HCPCS | Mod: CI

## 2018-01-09 PROCEDURE — 80048 BASIC METABOLIC PNL TOTAL CA: CPT

## 2018-01-09 PROCEDURE — G8987 SELF CARE CURRENT STATUS: HCPCS | Mod: CI

## 2018-01-09 PROCEDURE — 83735 ASSAY OF MAGNESIUM: CPT

## 2018-01-09 PROCEDURE — 770020 HCHG ROOM/CARE - TELE (206)

## 2018-01-09 PROCEDURE — A9270 NON-COVERED ITEM OR SERVICE: HCPCS | Performed by: HOSPITALIST

## 2018-01-09 PROCEDURE — 700102 HCHG RX REV CODE 250 W/ 637 OVERRIDE(OP): Performed by: INTERNAL MEDICINE

## 2018-01-09 PROCEDURE — 94660 CPAP INITIATION&MGMT: CPT

## 2018-01-09 PROCEDURE — 700102 HCHG RX REV CODE 250 W/ 637 OVERRIDE(OP): Performed by: HOSPITALIST

## 2018-01-09 PROCEDURE — 93005 ELECTROCARDIOGRAM TRACING: CPT | Performed by: HOSPITALIST

## 2018-01-09 PROCEDURE — 85027 COMPLETE CBC AUTOMATED: CPT

## 2018-01-09 PROCEDURE — 97166 OT EVAL MOD COMPLEX 45 MIN: CPT

## 2018-01-09 PROCEDURE — 84484 ASSAY OF TROPONIN QUANT: CPT

## 2018-01-09 PROCEDURE — 83880 ASSAY OF NATRIURETIC PEPTIDE: CPT

## 2018-01-09 PROCEDURE — A9270 NON-COVERED ITEM OR SERVICE: HCPCS | Performed by: INTERNAL MEDICINE

## 2018-01-09 PROCEDURE — 84132 ASSAY OF SERUM POTASSIUM: CPT

## 2018-01-09 PROCEDURE — 700111 HCHG RX REV CODE 636 W/ 250 OVERRIDE (IP): Performed by: INTERNAL MEDICINE

## 2018-01-09 PROCEDURE — 82962 GLUCOSE BLOOD TEST: CPT

## 2018-01-09 PROCEDURE — 99233 SBSQ HOSP IP/OBS HIGH 50: CPT | Performed by: HOSPITALIST

## 2018-01-09 PROCEDURE — G8989 SELF CARE D/C STATUS: HCPCS | Mod: CI

## 2018-01-09 PROCEDURE — 36415 COLL VENOUS BLD VENIPUNCTURE: CPT

## 2018-01-09 RX ORDER — FUROSEMIDE 20 MG/1
20 TABLET ORAL
Status: DISCONTINUED | OUTPATIENT
Start: 2018-01-09 | End: 2018-01-10

## 2018-01-09 RX ORDER — FUROSEMIDE 10 MG/ML
20 SOLUTION ORAL ONCE
Status: DISCONTINUED | OUTPATIENT
Start: 2018-01-09 | End: 2018-01-09

## 2018-01-09 RX ADMIN — METFORMIN HYDROCHLORIDE 500 MG: 500 TABLET, FILM COATED ORAL at 09:08

## 2018-01-09 RX ADMIN — METFORMIN HYDROCHLORIDE 500 MG: 500 TABLET, FILM COATED ORAL at 16:55

## 2018-01-09 RX ADMIN — GABAPENTIN 600 MG: 300 CAPSULE ORAL at 09:08

## 2018-01-09 RX ADMIN — ASPIRIN 325 MG: 325 TABLET ORAL at 21:30

## 2018-01-09 RX ADMIN — CARVEDILOL 3.12 MG: 3.12 TABLET, FILM COATED ORAL at 09:08

## 2018-01-09 RX ADMIN — SACUBITRIL AND VALSARTAN 1 TABLET: 24; 26 TABLET, FILM COATED ORAL at 21:30

## 2018-01-09 RX ADMIN — SACUBITRIL AND VALSARTAN 1 TABLET: 24; 26 TABLET, FILM COATED ORAL at 09:08

## 2018-01-09 RX ADMIN — TIOTROPIUM BROMIDE 1 CAPSULE: 18 CAPSULE ORAL; RESPIRATORY (INHALATION) at 09:12

## 2018-01-09 RX ADMIN — Medication 500 MG: at 09:07

## 2018-01-09 RX ADMIN — CARVEDILOL 3.12 MG: 3.12 TABLET, FILM COATED ORAL at 16:55

## 2018-01-09 RX ADMIN — ACETAMINOPHEN 650 MG: 325 TABLET, FILM COATED ORAL at 13:54

## 2018-01-09 RX ADMIN — FUROSEMIDE 20 MG: 20 TABLET ORAL at 16:55

## 2018-01-09 RX ADMIN — GABAPENTIN 600 MG: 300 CAPSULE ORAL at 21:30

## 2018-01-09 RX ADMIN — GABAPENTIN 600 MG: 300 CAPSULE ORAL at 13:54

## 2018-01-09 RX ADMIN — ZOLPIDEM TARTRATE 10 MG: 5 TABLET, FILM COATED ORAL at 21:30

## 2018-01-09 RX ADMIN — ENOXAPARIN SODIUM 40 MG: 100 INJECTION SUBCUTANEOUS at 09:08

## 2018-01-09 RX ADMIN — ALLOPURINOL 300 MG: 300 TABLET ORAL at 09:07

## 2018-01-09 ASSESSMENT — PAIN SCALES - GENERAL
PAINLEVEL_OUTOF10: 0
PAINLEVEL_OUTOF10: 4

## 2018-01-09 ASSESSMENT — COGNITIVE AND FUNCTIONAL STATUS - GENERAL
SUGGESTED CMS G CODE MODIFIER DAILY ACTIVITY: CI
HELP NEEDED FOR BATHING: A LITTLE
DAILY ACTIVITIY SCORE: 23

## 2018-01-09 ASSESSMENT — ENCOUNTER SYMPTOMS
FEVER: 0
CHILLS: 0

## 2018-01-09 ASSESSMENT — ACTIVITIES OF DAILY LIVING (ADL): TOILETING: INDEPENDENT

## 2018-01-09 NOTE — THERAPY
"Occupational Therapy Evaluation completed.   Functional Status: Pt is a 66 y/o female, admit with SOB. Pt lives with her , who can assist with care after d/c if needed. Pt used O2 at home with CPAP at night. Pt presents with mild SOB with activity, is at the Supervised level for AMB ADLS without the use of a device( with extra time). Pt will be seen for initial evaluation and treatment only, as she is functional and does not require acute OT services.   Plan of Care: Patient with no further skilled OT needs in the acute care setting at this time  Discharge Recommendations:  Equipment: No Equipment Needed. Post-acute therapy Currently anticipate no further skilled therapy needs once patient is discharged from the inpatient setting.    See \"Rehab Therapy-Acute\" Patient Summary Report for complete documentation.    "

## 2018-01-09 NOTE — DISCHARGE PLANNING
Care Transition Team Assessment    IHD met with patient bedside. She stated she lives with her  and he drives her for appointments. She does not use any DME, or HHC. She does wear 2L O2 occasionally, as well as a CPAP at night. She does not expect any new services after discharge but expressed interest in Meals in Wheels.     Information Source  Orientation : Oriented x 4  Information Given By: Patient  Informant's Name: Isatu  Who is responsible for making decisions for patient? : Patient    Readmission Evaluation  Is this a readmission?: No    Elopement Risk  Legal Hold: No  Ambulatory or Self Mobile in Wheelchair: Yes  Disoriented: No  Psychiatric Symptoms: None  History of Wandering: No  Elopement this Admit: No  Vocalizing Wanting to Leave: No  Displays Behaviors, Body Language Wanting to Leave: No-Not at Risk for Elopement  Elopement Risk: Not at Risk for Elopement    Interdisciplinary Discharge Planning  Does Admitting Nurse Feel This Could be a Complex Discharge?: No  Primary Care Physician: Dr. Edwin Urias  Lives with - Patient's Self Care Capacity: Spouse (and borther and sister in law)  Support Systems: Family Member(s)  Housing / Facility: [address]  Do You Take your Prescribed Medications Regularly: Yes  Able to Return to Previous ADL's: Yes  Mobility Issues: No  Prior Services: None  Patient Expects to be Discharged to:: home  Durable Medical Equipment: Home Oxygen  DME Provider / Phone: [employer]    Discharge Preparedness  What is your plan after discharge?: Home with help  What are your discharge supports?: Spouse, Child, Sibling  Prior Functional Level: Ambulatory, Independent with Activities of Daily Living, Independent with Medication Management  Difficulity with ADLs: None  Difficulity with IADLs: Driving  Difficulity with IADL Comments:  drives for appointments    Functional Assesment  Prior Functional Level: Ambulatory, Independent with Activities of Daily Living,  Independent with Medication Management    Finances  Financial Barriers to Discharge: No  Prescription Coverage: Yes ([facility] on [address])    Vision / Hearing Impairment  Vision Impairment : No  Hearing Impairment : No    Values / Beliefs / Concerns  Values / Beliefs Concerns : No    Advance Directive  Advance Directive?: None    Domestic Abuse  Have you ever been the victim of abuse or violence?: No  Physical Abuse or Sexual Abuse: No  Verbal Abuse or Emotional Abuse: No  Possible Abuse Reported to:: Not Applicable    Psychological Assessment  History of Substance Abuse: None  History of Psychiatric Problems: No  Non-compliant with Treatment: No  Newly Diagnosed Illness: No    Discharge Risks or Barriers  Discharge risks or barriers?: No    Anticipated Discharge Information  Anticipated discharge disposition: Discharge needs currently unknown  Discharge Address: [address] [place] [id number]  Discharge Contact Phone Number: [phone]

## 2018-01-09 NOTE — PROGRESS NOTES
Pt refuses bed alarm. Two RN's educated pt on fall risk and benefits of alarm. Pt verbalizes understanding and continues to refuse. Non slip socks in place. Bed locked and in lowest position. Call light within reach. Hourly rounding in place.

## 2018-01-09 NOTE — PROGRESS NOTES
Pt states she feels like she is having more trouble with SOB. Checked O2 - 95-96% on 2L. Assured pt. Left pulseox on for continuous monitoring. Lasix was recently given. Pt able to speak in full sentences, sitting up in chair. Pt educated to call if SOB worsens.

## 2018-01-09 NOTE — PROGRESS NOTES
9 beats of V Tach and back to pacing. Pt asymptomatic. Similar record from night shift. Will notify MD in rounds.

## 2018-01-09 NOTE — PROGRESS NOTES
Bedside report received from day RN. Assumed pt care. Pt a&ox4. POC discussed. Pt denies any needs at this time. Pt sitting up in chair at bedside. Call light within reach. Hourly rounding in place.

## 2018-01-09 NOTE — PROGRESS NOTES
Received bedside report from PM nurse. Assumed patient care. Chart reviewed. Pt was resting in chair, leaning over the table to facilitate breathing. States she is still not feeling well and has SOB. Diminished lung sounds. 2L of O2. A&O x 4. Patient denies pain. POC updated with pt and on the patient communication board. Bed locked and in the lowest position. Call light within reach. Tele box on. Will continue to monitor.

## 2018-01-09 NOTE — PROGRESS NOTES
[facility] Hospitalist Progress Note    Date of Service: 2018    Chief Complaint  67 y.o. female admitted 2018 with sob    Interval Problem Update  Patient in bed on CPAP, feeling ok , not that better this morning, some nausea. breathing mildly  Improved. Has hx of AICD, will consult cardiology    Bnp elevated- caution with lasix given her BP    Echo-17 Ef 25%    Consultants/Specialty  cardiology    Disposition  home        Review of Systems   Constitutional: Negative for chills and fever.      Physical Exam  Laboratory/Imaging   Hemodynamics  Temp (24hrs), Av.8 °C (98.3 °F), Min:36.2 °C (97.1 °F), Max:37.6 °C (99.7 °F)   Temperature: 36.2 °C (97.1 °F)  Pulse  Av.4  Min: 58  Max: 118   Blood Pressure : (!) 94/43      Respiratory      Respiration: 16, Pulse Oximetry: 98 %     Work Of Breathing / Effort: Mild;Moderate  RUL Breath Sounds: Clear, RML Breath Sounds: Diminished, RLL Breath Sounds: Diminished, SHUBHAM Breath Sounds: Clear, LLL Breath Sounds: Diminished    Fluids    Intake/Output Summary (Last 24 hours) at 18 0918  Last data filed at 18 0913   Gross per 24 hour   Intake             1310 ml   Output             1170 ml   Net              140 ml       Nutrition  Orders Placed This Encounter   Procedures   • Diet Order     Standing Status:   Standing     Number of Occurrences:   1     Order Specific Question:   Diet:     Answer:   2 Gram Sodium [7]     Order Specific Question:   Consistency/Fluid modifications:     Answer:   1500 ml Fluid Restriction [9]     Physical Exam   Constitutional: She is oriented to person, place, and time. No distress.   Eyes: Left eye exhibits no discharge.   Neck: No tracheal deviation present. No thyromegaly present.   Cardiovascular: Normal rate.  Exam reveals no gallop and no friction rub.    No murmur heard.  Pulmonary/Chest: Effort normal. She has no rales (bibasilar).   Intermittent rales   Abdominal: She exhibits no distension. There is no  tenderness. There is no rebound and no guarding.   Musculoskeletal: She exhibits edema (trace both legs).   Neurological: She is alert and oriented to person, place, and time. No cranial nerve deficit. Coordination normal.   Skin: No rash noted. She is not diaphoretic. No erythema. No pallor.       Recent Labs      01/07/18   0855  01/08/18   0152  01/09/18   0015   WBC  11.5*  8.8  9.0   RBC  5.16  4.53  4.49   HEMOGLOBIN  16.0  14.1  14.0   HEMATOCRIT  47.9*  41.5  41.1   MCV  92.8  91.6  91.5   MCH  31.0  31.1  31.2   MCHC  33.4*  34.0  34.1   RDW  52.7*  51.1*  51.1*   PLATELETCT  269  190  195   MPV  11.0  11.4  11.1     Recent Labs      01/07/18   0855  01/08/18   0152  01/09/18   0015   SODIUM  139  135  136   POTASSIUM  4.9  4.1  4.1   CHLORIDE  101  100  98   CO2  28  27  30   GLUCOSE  145*  117*  107*   BUN  22  26*  27*   CREATININE  1.17  0.87  0.89   CALCIUM  10.2  9.2  9.5     Recent Labs      01/07/18   0855   APTT  38.3*   INR  1.05     Recent Labs      01/07/18   0855  01/09/18   0015   BNPBTYPENAT  3092*  2817*              Assessment/Plan     Acute on chronic respiratory failure (CMS-HCC)   Assessment & Plan    Diuresis with iv lasix cautiously    Oxygen prn        Acute on chronic systolic heart failure (CMS-HCC)   Assessment & Plan    BNP 2800   ef 25%  Was being treated aggressively with diuretic, however BP marginal now <100, will decrease lasix dose  Coreg 3mg bid  acei    Consulted cards today , hx of AICD        DM type 2 (diabetes mellitus, type 2) (CMS-HCC)- (present on admission)   Assessment & Plan    Diabetic diet    fs with ssi    Cont metformin        monitor Bp, caution with lasix use,  Cards consulted today; hx of AICF      Reece catheter::  No Reece  DVT prophylaxis pharmacological::  Enoxaparin (Lovenox)

## 2018-01-10 PROBLEM — I47.29 NONSUSTAINED VENTRICULAR TACHYCARDIA (HCC): Status: ACTIVE | Noted: 2018-01-10

## 2018-01-10 PROBLEM — I50.43 ACUTE ON CHRONIC COMBINED SYSTOLIC AND DIASTOLIC CHF (CONGESTIVE HEART FAILURE) (HCC): Status: RESOLVED | Noted: 2017-11-05 | Resolved: 2018-01-10

## 2018-01-10 LAB
ALBUMIN SERPL BCP-MCNC: 3.8 G/DL (ref 3.2–4.9)
ALBUMIN/GLOB SERPL: 1.5 G/DL
ALP SERPL-CCNC: 46 U/L (ref 30–99)
ALT SERPL-CCNC: 23 U/L (ref 2–50)
ANION GAP SERPL CALC-SCNC: 6 MMOL/L (ref 0–11.9)
AST SERPL-CCNC: 33 U/L (ref 12–45)
BASOPHILS # BLD AUTO: 0.9 % (ref 0–1.8)
BASOPHILS # BLD: 0.07 K/UL (ref 0–0.12)
BILIRUB SERPL-MCNC: 0.8 MG/DL (ref 0.1–1.5)
BUN SERPL-MCNC: 28 MG/DL (ref 8–22)
CALCIUM SERPL-MCNC: 9.5 MG/DL (ref 8.5–10.5)
CHLORIDE SERPL-SCNC: 96 MMOL/L (ref 96–112)
CO2 SERPL-SCNC: 31 MMOL/L (ref 20–33)
CREAT SERPL-MCNC: 0.89 MG/DL (ref 0.5–1.4)
EKG IMPRESSION: NORMAL
EOSINOPHIL # BLD AUTO: 0.58 K/UL (ref 0–0.51)
EOSINOPHIL NFR BLD: 7 % (ref 0–6.9)
ERYTHROCYTE [DISTWIDTH] IN BLOOD BY AUTOMATED COUNT: 51.7 FL (ref 35.9–50)
GLOBULIN SER CALC-MCNC: 2.5 G/DL (ref 1.9–3.5)
GLUCOSE BLD-MCNC: 100 MG/DL (ref 65–99)
GLUCOSE BLD-MCNC: 104 MG/DL (ref 65–99)
GLUCOSE BLD-MCNC: 109 MG/DL (ref 65–99)
GLUCOSE BLD-MCNC: 110 MG/DL (ref 65–99)
GLUCOSE SERPL-MCNC: 103 MG/DL (ref 65–99)
HCT VFR BLD AUTO: 43.4 % (ref 37–47)
HGB BLD-MCNC: 14.6 G/DL (ref 12–16)
IMM GRANULOCYTES # BLD AUTO: 0.04 K/UL (ref 0–0.11)
IMM GRANULOCYTES NFR BLD AUTO: 0.5 % (ref 0–0.9)
LYMPHOCYTES # BLD AUTO: 1.53 K/UL (ref 1–4.8)
LYMPHOCYTES NFR BLD: 18.6 % (ref 22–41)
MAGNESIUM SERPL-MCNC: 1.9 MG/DL (ref 1.5–2.5)
MCH RBC QN AUTO: 30.9 PG (ref 27–33)
MCHC RBC AUTO-ENTMCNC: 33.6 G/DL (ref 33.6–35)
MCV RBC AUTO: 91.8 FL (ref 81.4–97.8)
MONOCYTES # BLD AUTO: 0.51 K/UL (ref 0–0.85)
MONOCYTES NFR BLD AUTO: 6.2 % (ref 0–13.4)
NEUTROPHILS # BLD AUTO: 5.5 K/UL (ref 2–7.15)
NEUTROPHILS NFR BLD: 66.8 % (ref 44–72)
NRBC # BLD AUTO: 0 K/UL
NRBC BLD-RTO: 0 /100 WBC
PLATELET # BLD AUTO: 214 K/UL (ref 164–446)
PMV BLD AUTO: 11 FL (ref 9–12.9)
POTASSIUM SERPL-SCNC: 4.2 MMOL/L (ref 3.6–5.5)
PROT SERPL-MCNC: 6.3 G/DL (ref 6–8.2)
RBC # BLD AUTO: 4.73 M/UL (ref 4.2–5.4)
SODIUM SERPL-SCNC: 133 MMOL/L (ref 135–145)
WBC # BLD AUTO: 8.2 K/UL (ref 4.8–10.8)

## 2018-01-10 PROCEDURE — 82962 GLUCOSE BLOOD TEST: CPT | Mod: 91

## 2018-01-10 PROCEDURE — 700102 HCHG RX REV CODE 250 W/ 637 OVERRIDE(OP): Performed by: HOSPITALIST

## 2018-01-10 PROCEDURE — 83735 ASSAY OF MAGNESIUM: CPT

## 2018-01-10 PROCEDURE — 700102 HCHG RX REV CODE 250 W/ 637 OVERRIDE(OP): Performed by: INTERNAL MEDICINE

## 2018-01-10 PROCEDURE — 700111 HCHG RX REV CODE 636 W/ 250 OVERRIDE (IP): Performed by: INTERNAL MEDICINE

## 2018-01-10 PROCEDURE — 85025 COMPLETE CBC W/AUTO DIFF WBC: CPT

## 2018-01-10 PROCEDURE — 770020 HCHG ROOM/CARE - TELE (206)

## 2018-01-10 PROCEDURE — 99233 SBSQ HOSP IP/OBS HIGH 50: CPT | Performed by: HOSPITALIST

## 2018-01-10 PROCEDURE — A9270 NON-COVERED ITEM OR SERVICE: HCPCS | Performed by: HOSPITALIST

## 2018-01-10 PROCEDURE — A9270 NON-COVERED ITEM OR SERVICE: HCPCS | Performed by: INTERNAL MEDICINE

## 2018-01-10 PROCEDURE — 94660 CPAP INITIATION&MGMT: CPT

## 2018-01-10 PROCEDURE — 80053 COMPREHEN METABOLIC PANEL: CPT

## 2018-01-10 PROCEDURE — 36415 COLL VENOUS BLD VENIPUNCTURE: CPT

## 2018-01-10 RX ORDER — CARVEDILOL 12.5 MG/1
12.5 TABLET ORAL 2 TIMES DAILY WITH MEALS
Status: DISCONTINUED | OUTPATIENT
Start: 2018-01-10 | End: 2018-01-10

## 2018-01-10 RX ORDER — CARVEDILOL 6.25 MG/1
6.25 TABLET ORAL 2 TIMES DAILY WITH MEALS
Status: DISCONTINUED | OUTPATIENT
Start: 2018-01-10 | End: 2018-01-11

## 2018-01-10 RX ORDER — FUROSEMIDE 10 MG/ML
20 INJECTION INTRAMUSCULAR; INTRAVENOUS
Status: DISCONTINUED | OUTPATIENT
Start: 2018-01-10 | End: 2018-01-11

## 2018-01-10 RX ADMIN — TIOTROPIUM BROMIDE 1 CAPSULE: 18 CAPSULE ORAL; RESPIRATORY (INHALATION) at 09:10

## 2018-01-10 RX ADMIN — GABAPENTIN 600 MG: 300 CAPSULE ORAL at 14:00

## 2018-01-10 RX ADMIN — ENOXAPARIN SODIUM 40 MG: 100 INJECTION SUBCUTANEOUS at 09:10

## 2018-01-10 RX ADMIN — Medication 500 MG: at 09:09

## 2018-01-10 RX ADMIN — CARVEDILOL 6.25 MG: 6.25 TABLET, FILM COATED ORAL at 18:18

## 2018-01-10 RX ADMIN — METFORMIN HYDROCHLORIDE 500 MG: 500 TABLET, FILM COATED ORAL at 09:09

## 2018-01-10 RX ADMIN — SACUBITRIL AND VALSARTAN 1 TABLET: 24; 26 TABLET, FILM COATED ORAL at 09:09

## 2018-01-10 RX ADMIN — FUROSEMIDE 20 MG: 10 INJECTION, SOLUTION INTRAMUSCULAR; INTRAVENOUS at 13:59

## 2018-01-10 RX ADMIN — GABAPENTIN 600 MG: 300 CAPSULE ORAL at 09:09

## 2018-01-10 RX ADMIN — RIVAROXABAN 20 MG: 20 TABLET, FILM COATED ORAL at 18:18

## 2018-01-10 RX ADMIN — ZOLPIDEM TARTRATE 10 MG: 5 TABLET, FILM COATED ORAL at 21:41

## 2018-01-10 RX ADMIN — SACUBITRIL AND VALSARTAN 1 TABLET: 24; 26 TABLET, FILM COATED ORAL at 21:36

## 2018-01-10 RX ADMIN — GABAPENTIN 600 MG: 300 CAPSULE ORAL at 21:36

## 2018-01-10 RX ADMIN — ALLOPURINOL 300 MG: 300 TABLET ORAL at 09:09

## 2018-01-10 RX ADMIN — METFORMIN HYDROCHLORIDE 500 MG: 500 TABLET, FILM COATED ORAL at 18:18

## 2018-01-10 RX ADMIN — CARVEDILOL 6.25 MG: 6.25 TABLET, FILM COATED ORAL at 13:59

## 2018-01-10 ASSESSMENT — ENCOUNTER SYMPTOMS
MYALGIAS: 0
HEADACHES: 0
NECK PAIN: 0
CHILLS: 0
SORE THROAT: 0
FEVER: 0
DOUBLE VISION: 0
SPUTUM PRODUCTION: 0
PALPITATIONS: 0
DIZZINESS: 0
VOMITING: 0
SHORTNESS OF BREATH: 1
ABDOMINAL PAIN: 0
DEPRESSION: 0
COUGH: 0
STRIDOR: 0
NAUSEA: 0
BLURRED VISION: 0
DIARRHEA: 0

## 2018-01-10 ASSESSMENT — PAIN SCALES - GENERAL
PAINLEVEL_OUTOF10: 0

## 2018-01-10 NOTE — CONSULTS
Cardiology Initial Consult Note    Date of note:    1/10/2018      Consulting Physician: Celena Keller M.D.    Patient ID:    Name:   Isatu Dang   YOB: 1950  Age:   67 y.o.  female   MRN:   [id number]      Reason for Consultation: CHF    HPI:  Isatu Dang is a 67 y.o.-year-old female with a history of Stage C left sided systolic heart failure (EF 25%), Medtronic CRT-D, severe functional mitral regurgitation,  hypertension, hypercholesterolemia who presents with SOB    She was recently seen in clinic by Dr. Betancourt on 12/15/17, she is awaiting implantable PA monitor and enrollment in the Plastioic HF trial.  After that visit, she left for [place].  She presented on 1/7/2018 with worsening SOB, orthopnea, PND, and LE swelling for 1 week in the setting of possible sodium dietary indescretion. BNP was elevated at 2800.      Monitor review has shown 100% paced beats, multiple PVCs and ectopic beats, and short runs of NSVT (asymptomatic) as well as SVT. She has also been hypotensive, cardiology was consulted for assistance in management.     Currently she is asymptomatic except for abdominal pain.      CRT-D device interrogated and is working well.  It did show multiple episodes of atrial fibrillation for >30 hours back in September.  She was unaware of this event.  She is not on anticoagulation.  She denies history of diabetes, CVA, or PVD.         ROS  Patient denies chest pain, shortness of breath, palpitations, lightheadedness, melena, BRBPR, hematemesis, cough, new numbness, tingling, or focal weakness.      All others reviewed and negative.      Past Medical History:   Diagnosis Date   • ACC/AHA stage C systolic heart failure (CMS-HCC) 11/8/2017   • Arrhythmia    • Bowel habit changes     Diarrhea.   • Cancer (CMS-HCC) 2012    Lymphoma to neck   • Cardiac arrest (CMS-[facility]) 1998        • CATARACT     Beginning stages no surgery yet   • Congestive heart failure (CHF) (CMS-HCC)    • Dental  disorder     Upper/Lower dentures.   • Depression    • Diabetes (CMS-[facility])    • Dilated cardiomyopathy    • Hypercholesteremia    • Hypertension 1998    Pt states bp runs low on current meds   • Indigestion    • Joint replacement         • LBBB (left bundle branch block)    • Lung cancer (CMS-[facility]) 2012        • Myocardial infarct 1998   • Neuropathy    • Obesity    • Pain 2011    Legs pain controled on meds   • Peripheral neuropathy (CMS-[facility])    • Renal cyst, acquired, left      History of benign cystic mass on the lower left kidney.   • Sleep apnea    • Unspecified hemorrhagic conditions     from aspirin   • Ventricular tachycardia (CMS-[facility])        Past Surgical History:   Procedure Laterality Date   • AICD BATTERY CHANGE  November 2016    Generator replacement with Medtronic Viva S CRT-D [id number] implanted by Dr. Alexis. Original device implanted in 1998   • LARYNGOSCOPY  11/14/2013    Performed by Judson Willis M.D. at SURGERY SAME DAY [facility]   • BIOPSY GENERAL  11/14/2013    Performed by Judson Willis M.D. at SURGERY SAME DAY [facility]   • THORACOSCOPY ROBOTIC  8/27/2012    Performed by GANSER, JOHN H at SURGERY [facility]   • RECOVERY  7/23/2012    Performed by SURGERY, IR-RECOVERY at SURGERY SAME DAY [facility] ORS   • BONE MARROW ASPIRATION  6/22/2012    Performed by ISAIAS EVERETT at ENDOSCOPY [facility] ORS   • BONE MARROW BIOPSY, NDL/TROCAR  6/22/2012    Performed by ISAIAS EVERETT at ENDOSCOPY [facility] ORS   • NECK MASS EXCISION  5/24/2012    Performed by JUDSON WILLIS at SURGERY SAME DAY [facility]   • RECOVERY  10/21/2011    Performed by SURGERY, CATH-RECOVERY at SURGERY SAME DAY [facility]   • CARPAL TUNNEL RELEASE  4/3/08    Performed by DEEP GREEN at SURGERY [facility]   • LIPOMA EXCISION  4/3/08    Performed by DEEP GREEN at [facility]   • CARPAL TUNNEL RELEASE Right 05/07        • HYSTERECTOMY, TOTAL ABDOMINAL  1995   • HYSTERECTOMY,  TOTAL ABDOMINAL          • IMPLANTABLE CARDIOVERTER DEFIBRILLATOR (ICD)  4/1/98, 03/2007, 10/2011   • TONSILLECTOMY AND ADENOIDECTOMY                Prescriptions Prior to Admission   Medication Sig Dispense Refill Last Dose   • zolpidem (AMBIEN) 10 MG Tab TAKE ONE TABLET BY MOUTH AT BEDTIME, as needed prn insomnia 90 Tab 1 1/6/2018 at pm   • carvedilol (COREG) 6.25 MG Tab Take 1 Tab by mouth 2 times a day, with meals. 180 Tab 3 1/6/2018 at pm   • spironolactone (ALDACTONE) 25 MG Tab Take 0.5 Tabs by mouth every day. 45 Tab 3 1/6/2018 at am   • torsemide (DEMADEX) 20 MG Tab Take 2 Tabs by mouth every day. 60 Tab 0 1/6/2018 at am   • metformin (GLUCOPHAGE) 500 MG Tab Take 1 Tab by mouth 2 times a day, with meals. 60 Tab 2 1/6/2018 at pm   • sacubitril-valsartan (ENTRESTO) 24-26 MG Tab tablet Take 1 Tab by mouth 2 Times a Day. 180 Tab 3 1/6/2018 at pm   • albuterol (PROAIR HFA) 108 (90 BASE) MCG/ACT Aero Soln inhalation aerosol Inhale 2 Puffs by mouth every 6 hours as needed for Shortness of Breath. 3 Inhaler 3 prn at prn   • Tiotropium Bromide Monohydrate (SPIRIVA RESPIMAT) 2.5 MCG/ACT Aero Soln Inhale 2 Inhalation by mouth every day. Assemble and prime. 3 Inhaler 3 1/6/2018 at am   • Cyanocobalamin (VITAMIN B-12 SL) Place 1 Tab under tongue every day.   1/6/2018 at am   • aspirin (ASA) 325 MG TABS Take 325 mg by mouth every bedtime.   1/6/2018 at pm   • Multiple Vitamin (MULTI-VITAMINS) TABS Take 1 Tab by mouth every day.   1/6/2018 at am   • Calcium Carbonate-Vitamin D (CALCIUM 600+D) 600-200 MG-UNIT TABS Take 2 Tabs by mouth every day.   1/6/2018 at am   • allopurinol (ZYLOPRIM) 300 MG TABS Take 300 mg by mouth every day.   1/6/2018 at am   • gabapentin (NEURONTIN) 300 MG CAPS Take 600 mg by mouth 3 times a day.   1/6/2018 at am     IP medications reviewed in MAR      Allergies   Allergen Reactions   • Tape Contact Dermatitis   • Ace Inhibitors Cough     cough         Family History   Problem Relation Age of Onset  "  • Diabetes Mother    • Cancer Mother    • Diabetes Sister    • Diabetes Maternal Grandmother          Social History     Social History   • Marital status:      Spouse name: N/A   • Number of children: N/A   • Years of education: N/A     Occupational History   • Not on file.     Social History Main Topics   • Smoking status: Former Smoker     Packs/day: 2.00     Years: 30.00     Types: Cigarettes     Quit date: 10/20/1998   • Smokeless tobacco: Never Used   • Alcohol use 0.0 - 1.2 oz/week      Comment: occasional    • Drug use: No   • Sexual activity: Not Currently     Other Topics Concern   • Not on file     Social History Narrative   • No narrative on file         Physical Exam  Body mass index is 28.79 kg/m².  Blood pressure (!) 94/62, pulse 96, temperature 36.7 °C (98 °F), resp. rate 17, height 1.575 m (5' 2\"), weight 71.4 kg (157 lb 6.5 oz), last menstrual period 11/05/1994, SpO2 97 %.  Vitals:    01/10/18 0330 01/10/18 0815 01/10/18 0816 01/10/18 0817   BP: (!) 95/59 (!) 84/50 (!) 79/51 (!) 94/62   Pulse: 100 96     Resp: 18 17     Temp: 36.4 °C (97.6 °F) 36.7 °C (98 °F)     SpO2: 95% 97%     Weight:       Height:         Oxygen Therapy:  Pulse Oximetry: 97 %, O2 (LPM): 2, O2 Delivery: Silicone Nasal Cannula    General: Well appearing and in no apparent distress  Eyes: nl conjunctiva  ENT: OP clear  Neck: JVP 15 cm H2O, no carotid bruits  Lungs: normal respiratory effort, bibasilar crackles  Heart: RRR, 2/6 systolic murmur at apex, no rubs or gallops, 2+ edema bilateral lower extremities. No LV/RV heave on cardiac palpatation. 2+ bilateral radial pulses.  2+ bilateral dp pulses.   Abdomen: soft, non tender, non distended, no masses, normal bowel sounds.  No HSM.  Extremities/MSK: no clubbing, no cyanosis  Neurological: No focal sensory deficits  Psychiatric: Appropriate affect, A/O x 3  Skin: Warm extremities        Labs (personally reviewed and notable for):   BNP 1/7/2018 - 2800  Trop " 0.05  Creatinine 0.9        Cardiac Imaging and Procedures Review:    EKG dated 1/7/2018: My personal interpretation is A sensed, v paced at 122    Echo dated 12/18/2017:  Left Ventricle  Left ventricle is moderately dilated. Normal left ventricular wall   thickness. Severely reduced left ventricular systolic function. Left   ventricular ejection fraction is visually estimated to be 25%.   Hypokinesis of the inferior and lateral walls. Contractility is best   preserved in the anterior wall and the septum.    Right Ventricle  Normal right ventricular size and systolic function.    Right Atrium  Normal right atrial size. Dilated inferior vena cava with inspiratory   collapse.    Left Atrium  Severely dilated left atrium. Left atrial volume index is 54 mL/sq m.    Mitral Valve  Severe, functional mitral regurgitation. ERO by PISA method is 0.4 sq   cm. The PISA radius is 0.8 cm.    Aortic Valve  Aortic sclerosis without stenosis. Moderate (borderline severe) aortic   insufficiency. Vena contracta is 0.6 cm.    Tricuspid Valve  Moderate tricuspid regurgitation. Estimated right ventricular systolic   pressure  is 60 mmHg.    Pulmonic Valve  The pulmonic valve is not well visualized. No stenosis or regurgitation   seen.    Pericardium  No pericardial effusion seen.    Aorta  Normal ascending aorta.      [facility] (2011):   FINDINGS:  1.  Sinus rhythm with ventricular pacing and PVCs.  2.  The left ventricular pressure is 106/7 end diastolic.  After left  ventricular cineangiogram, the EDP was 13.  Systemic pressure was low-  normal throughout procedure, typically 98/48.  There was no gradient  across the left ventricular outflow tract.  3.  Fluoroscopy demonstrates endocardial lead in the right ventricle.  Fluoroscopy demonstrates no intracardiac calcification.  4.  Selective coronary arteriograms.  A.  Left main:  This vessel appears normal.  B.  Left anterior descending:  The LAD gives off one diagonal branch as  well as  septal perforators and terminates basically at the apex of the  left ventricle.  Slight tortuosity in the midportion of the LAD.  No  flow limiting lesions.  No definitive plaquing.  C.  Ramus intermedius is a very small vessel and appears normal.  D.  Circumflex coronary artery is a nondominant vessel and consists of a  series of obtuse marginal vessels and a vestigial vessel in AV sulcus.  No lesions are noted in this nondominant system.  E.  Right coronary artery is a dominant vessel.  Gives off pulmonary  conus branch and posterior descending coronary artery and posterolateral  branch and AV node artery.  This dominant vessel has no lesions within  it.  5.  Left ventricular cineangiogram:  The left ventricle is enlarged and  globular in shape.  Severe diffuse hypokinesis is noted.  The ejection  fraction is measured at 25% and this agrees with the subjective  appearance of the contractility. The apex is well-visualized.  No  evidence of mural thrombus. No mitral regurgitation.     IMPRESSION  1.  Near-normal coronary arteriograms.  2.  Dilated globular left ventricle with diffuse hypokinesis and  ejection fraction of 25%.  3.  Normal left heart pressures at rest.       Radiology test Review:  CXR:   1.  Slightly improvement of diffuse interstitial edema pattern.    2.  Persistent cardiomegaly, mild interstitial edema, bibasilar atelectasis, and bilateral pleural effusions.       Impression and Medical Decision Making:  # Acute on chronic left sided systolic heart failure, EF 25%, non-ischemic  # Severe mitral regurgitation  # Moderate aortic regurgitation  # history of Medtronic CRT-D placement, original RV lead placed 1998  # h/o lung adenocarcinoma, T2N0M0  # Low grade NHL follicular lymphoma, 2012, followed by Dr. Nina Rodríguez, awaiting PET/CT and treatment  # Diabetes   # Paroxysmal atrial fibrillation    Recommendations:  # Continue diuresis, lasix 20mg IV daily, another dose overnight if not at least 1L  negative, once euvolemic, will transition to PO for one day prior to discharge.  # Discussed with Dr. Barajas about placement of PA monitor while in hospital, possibly as early as tomorrow or Friday.  Will make NPO if this is too occur.  # Increase coreg to 6.25mg PO daily  # continue entresto  # DC aspirin, start rivaroxaban 20mg PO daily for atrial fibrillation  # will restart home spironolactone if stable overnight    Discussed above recommendations with Dr. Keller.       Thank you for allowing me to participate in the care of this patient, I will continue to follow.  Please contact me with any questions.      Gilberto Dillon MD  Cardiologist, [facility] [place]   [phone]

## 2018-01-10 NOTE — PROGRESS NOTES
9 beats of VT again. Notified Dr. Keller and Dr. Dillon with cardiology. Awaiting the orders for medications.

## 2018-01-10 NOTE — CARE PLAN
Problem: Respiratory:  Goal: Respiratory status will improve  Patient on 2L NC, patient also wears CPAP at night with 2L NC. This is patients baseline at home.     Problem: Fluid Volume:  Goal: Will maintain balanced intake and output  Patient has 1500ml fluid restriction. Patient has been educated on purpose of fluid restriction and importance.

## 2018-01-10 NOTE — HEART FAILURE PROGRAM
"Cardiovascular Nurse Navigator () Progress Note:     Please note this is a HFrEF pt. As of 1/10 diuresis is ongoing, Cardiology following.      [facility] Plan Notes:   CTTA indicating good social support, transportation and no issues affording meds  Therapy Notes:   Not indicating skilled need  Insurance Coverage:  Medicare  Patient Residence:  [place]  Follow up appointment:   Pt must have an appointment scheduled within 7 days of discharge (Cardiology, PCP, or DC Clinic).      This [facility] has placed an order for Hospital Schedulers to arrange f/u appointment within seven calendar days of anticipated discharge date: 1/12. Get into HF Program eventually if not for seven day appt.    Education:  Bedside nursing to please provide patient with HF packet and begin teaching and documenting in education tab, each shift should teach sections until all are covered.     When completing the after visit summary (discharge instructions) please select \"Cardiac Diagnosis, and Heart Failure\" in the special instructions section to populate the heart failure specific discharge instructions.     Referrals Placed:       Registered Dietician  To provide education on HF diet.    Thank you and please call [facility] with any questions: [phone].    "

## 2018-01-10 NOTE — PROGRESS NOTES
Monitor Summary  100% paced   Frequent PVCs, Couplet  5 Beats VTach  10 seconds of HR in the 160's

## 2018-01-10 NOTE — CARE PLAN
Problem: Safety  Goal: Will remain free from falls  Fall precautions in place. Bed wheels locked, bed is in the lowest position. Call light in reach. Non-skid socks provided. Patient provides successful verbalization of fall and safety precautions and demonstration of use of call bell. Upper side rails are up. Hourly rounding in progress.     Problem: Knowledge Deficit  Goal: Knowledge of disease process/condition, treatment plan, diagnostic tests, and medications will improve  POC discussed with the patient and family. All questions and concerns addressed and answered. Patient is involved in POC and verbalizes the understanding of the disease process, medications, tests and treatments. Questions on POC encouraged throughout care.

## 2018-01-10 NOTE — PROGRESS NOTES
"Notified Dr. Keller of the telemetry events last night. Discussed the need to interrogate the pacemaker since pt states \"it has been a while.\" Pt still feel \"worn out.\" MD to place orders.   "

## 2018-01-10 NOTE — PROGRESS NOTES
Received bedside report from PM nurse. Assumed patient care. Chart reviewed. Pt was sitting at the edge of the bed, leaning over the table.  HR pacing 99 with frequent PVC. A&O x 4. Patient denies pain. POC updated with pt and on the patient communication board. Bed locked and in the lowest position. Call light within reach. Tele box on. Will continue to monitor.

## 2018-01-10 NOTE — PROGRESS NOTES
Monitor check called on patient, patient found leaning over bedside table, alert and responsive. Patient states she sat up due to her stomach feeling upset. Monitor room contacted and monitor check called due to heart rate increased into the 160's. MD paged and updated on patient status. Orders received for labs and stat EKG. Will update MD when resulted.

## 2018-01-10 NOTE — CARE PLAN
Problem: Knowledge Deficit  Goal: Knowledge of disease process/condition, treatment plan, diagnostic tests, and medications will improve  POC discussed with the patient and family. All questions and concerns addressed and answered. Patient is involved in POC and verbalizes the understanding of the disease process, medications, tests and treatments. Questions on POC encouraged throughout care.       Problem: Respiratory:  Goal: Respiratory status will improve  Respiratory rate and rhythm assessed and monitored. Oxygenation and saturation monitored and titrated as ordered. Patient positioned optimally. Collaboration with RT in place.

## 2018-01-10 NOTE — PROGRESS NOTES
[facility]ist Progress Note    Date of Service: 1/10/2018    Chief Complaint  67 y.o. female admitted 2018 with sob    Interval Problem Update   Here for CHF with reduced EF 25%,Has hx of AICD,not having ectopy on tele, several beats of V.tach, asymptomatic- discussed case with  in detail today  Elevated BNP but Low Bp, so caution with lasix use      Consultants/Specialty  cardiology    Disposition  home        Review of Systems   Constitutional: Negative for chills and fever.   HENT: Positive for hearing loss. Negative for sore throat.    Eyes: Negative for blurred vision and double vision.   Respiratory: Positive for shortness of breath. Negative for cough, sputum production and stridor.    Cardiovascular: Negative for chest pain, palpitations and leg swelling.   Gastrointestinal: Negative for abdominal pain, diarrhea, nausea and vomiting.   Genitourinary: Negative for urgency.   Musculoskeletal: Negative for myalgias and neck pain.   Neurological: Negative for dizziness and headaches.   Psychiatric/Behavioral: Negative for depression.      Physical Exam  Laboratory/Imaging   Hemodynamics  Temp (24hrs), Av.6 °C (97.9 °F), Min:36.3 °C (97.4 °F), Max:36.9 °C (98.5 °F)   Temperature: 36.7 °C (98 °F)  Pulse  Av.2  Min: 58  Max: 118   Blood Pressure : (!) 94/62 (RN notified)      Respiratory      Respiration: 17, Pulse Oximetry: 97 %     Work Of Breathing / Effort: Mild;Moderate  RUL Breath Sounds: Clear, RML Breath Sounds: Diminished, RLL Breath Sounds: Diminished, SHUBHAM Breath Sounds: Clear, LLL Breath Sounds: Diminished    Fluids    Intake/Output Summary (Last 24 hours) at 01/10/18 1135  Last data filed at 01/10/18 0500   Gross per 24 hour   Intake              840 ml   Output               50 ml   Net              790 ml       Nutrition  Orders Placed This Encounter   Procedures   • Diet Order     Standing Status:   Standing     Number of Occurrences:   1     Order Specific Question:    Diet:     Answer:   2 Gram Sodium [7]     Order Specific Question:   Consistency/Fluid modifications:     Answer:   1500 ml Fluid Restriction [9]     Physical Exam   Constitutional: She is oriented to person, place, and time. No distress.   Eyes: Left eye exhibits no discharge.   Neck: No tracheal deviation present. No thyromegaly present.   Cardiovascular: Normal rate.  Exam reveals no gallop and no friction rub.    No murmur heard.  Pulmonary/Chest: She is in respiratory distress. She has no wheezes. She has no rales (bibasilar).   On home 2L O2 requirement, usually wears 2L at night but at times in the day as well depending how she feels   Abdominal: She exhibits no distension. There is no tenderness. There is no rebound and no guarding.   Musculoskeletal: She exhibits edema (trace both legs).   Neurological: She is alert and oriented to person, place, and time. No cranial nerve deficit. Coordination normal.   Skin: No rash noted. She is not diaphoretic. No erythema. No pallor.   Psychiatric: She has a normal mood and affect.       Recent Labs      01/08/18   0152  01/09/18   0015  01/10/18   0821   WBC  8.8  9.0  8.2   RBC  4.53  4.49  4.73   HEMOGLOBIN  14.1  14.0  14.6   HEMATOCRIT  41.5  41.1  43.4   MCV  91.6  91.5  91.8   MCH  31.1  31.2  30.9   MCHC  34.0  34.1  33.6   RDW  51.1*  51.1*  51.7*   PLATELETCT  190  195  214   MPV  11.4  11.1  11.0     Recent Labs      01/08/18   0152  01/09/18   0015  01/09/18   2251   SODIUM  135  136   --    POTASSIUM  4.1  4.1  4.2   CHLORIDE  100  98   --    CO2  27  30   --    GLUCOSE  117*  107*   --    BUN  26*  27*   --    CREATININE  0.87  0.89   --    CALCIUM  9.2  9.5   --          Recent Labs      01/09/18   0015   BNPBTYPENAT  2817*              Assessment/Plan     Nonsustained ventricular tachycardia (CMS-HCC)   Assessment & Plan    Picked up by tele, patient asymptomatic  Cardiology on board  Will check labs        Acute on chronic respiratory failure (CMS-HCC)    Assessment & Plan    Likely due to CHF with elevated BNP, caution use of lasix given marginal BP  Cardiology Dr. Dillon aware; appreciate recs        Acute on chronic systolic heart failure (CMS-HCC)   Assessment & Plan    BNP 2800   ef 25%  Was being treated aggressively with diuretic, however BP marginal now <100, will decrease lasix dose  Coreg 3mg bid  acei    Consulted cards today discussed in detail with Dr. Dillon , hx of AICD no having ectopy and beats of V.tach nonsustained on Tele        DM type 2 (diabetes mellitus, type 2) (CMS-HCC)- (present on admission)   Assessment & Plan    Diabetic diet    fs with ssi    Cont metformin          Cards consulted today discussed in detail with Dr. Dillon; hx of AICD(working well per Dr. Dillon), nonsustained Vtach,her home BB dose adjusted back. asymptomatic  Monitor mg and k  A.fib, dc asa and start Xarelto  Goals to diurese 1L  Transition to PO tomorrow and plans for Dc fri pending clinical improvement      Reece catheter::  No Reece  DVT prophylaxis pharmacological::  Enoxaparin (Lovenox)

## 2018-01-10 NOTE — PROGRESS NOTES
RN received report, assumed pt care. Patient is resting in bed. Denies any pain at this time. Monitor in place. Call light within reach. All needs met at this time.

## 2018-01-11 ENCOUNTER — PATIENT OUTREACH (OUTPATIENT)
Dept: HEALTH INFORMATION MANAGEMENT | Facility: OTHER | Age: 68
End: 2018-01-11

## 2018-01-11 ENCOUNTER — APPOINTMENT (OUTPATIENT)
Dept: RADIOLOGY | Facility: MEDICAL CENTER | Age: 68
DRG: 291 | End: 2018-01-11
Attending: HOSPITALIST
Payer: MEDICARE

## 2018-01-11 LAB
ANION GAP SERPL CALC-SCNC: 10 MMOL/L (ref 0–11.9)
BASOPHILS # BLD AUTO: 1.3 % (ref 0–1.8)
BASOPHILS # BLD: 0.14 K/UL (ref 0–0.12)
BUN SERPL-MCNC: 31 MG/DL (ref 8–22)
CALCIUM SERPL-MCNC: 9.6 MG/DL (ref 8.5–10.5)
CHLORIDE SERPL-SCNC: 96 MMOL/L (ref 96–112)
CO2 SERPL-SCNC: 25 MMOL/L (ref 20–33)
CREAT SERPL-MCNC: 1.03 MG/DL (ref 0.5–1.4)
EOSINOPHIL # BLD AUTO: 0.69 K/UL (ref 0–0.51)
EOSINOPHIL NFR BLD: 6.6 % (ref 0–6.9)
ERYTHROCYTE [DISTWIDTH] IN BLOOD BY AUTOMATED COUNT: 50.8 FL (ref 35.9–50)
GLUCOSE BLD-MCNC: 109 MG/DL (ref 65–99)
GLUCOSE BLD-MCNC: 111 MG/DL (ref 65–99)
GLUCOSE BLD-MCNC: 115 MG/DL (ref 65–99)
GLUCOSE BLD-MCNC: 139 MG/DL (ref 65–99)
GLUCOSE SERPL-MCNC: 169 MG/DL (ref 65–99)
HCT VFR BLD AUTO: 44.6 % (ref 37–47)
HGB BLD-MCNC: 15.4 G/DL (ref 12–16)
IMM GRANULOCYTES # BLD AUTO: 0.09 K/UL (ref 0–0.11)
IMM GRANULOCYTES NFR BLD AUTO: 0.9 % (ref 0–0.9)
INR PPP: 2.54 (ref 0.87–1.13)
LYMPHOCYTES # BLD AUTO: 1.89 K/UL (ref 1–4.8)
LYMPHOCYTES NFR BLD: 18.1 % (ref 22–41)
MAGNESIUM SERPL-MCNC: 1.9 MG/DL (ref 1.5–2.5)
MCH RBC QN AUTO: 31 PG (ref 27–33)
MCHC RBC AUTO-ENTMCNC: 34.5 G/DL (ref 33.6–35)
MCV RBC AUTO: 89.9 FL (ref 81.4–97.8)
MONOCYTES # BLD AUTO: 0.72 K/UL (ref 0–0.85)
MONOCYTES NFR BLD AUTO: 6.9 % (ref 0–13.4)
NEUTROPHILS # BLD AUTO: 6.9 K/UL (ref 2–7.15)
NEUTROPHILS NFR BLD: 66.2 % (ref 44–72)
NRBC # BLD AUTO: 0 K/UL
NRBC BLD-RTO: 0 /100 WBC
PLATELET # BLD AUTO: 251 K/UL (ref 164–446)
PMV BLD AUTO: 11 FL (ref 9–12.9)
POTASSIUM SERPL-SCNC: 4.9 MMOL/L (ref 3.6–5.5)
PROTHROMBIN TIME: 27 SEC (ref 12–14.6)
RBC # BLD AUTO: 4.96 M/UL (ref 4.2–5.4)
SODIUM SERPL-SCNC: 131 MMOL/L (ref 135–145)
WBC # BLD AUTO: 10.4 K/UL (ref 4.8–10.8)

## 2018-01-11 PROCEDURE — 700102 HCHG RX REV CODE 250 W/ 637 OVERRIDE(OP): Performed by: INTERNAL MEDICINE

## 2018-01-11 PROCEDURE — 99232 SBSQ HOSP IP/OBS MODERATE 35: CPT | Performed by: HOSPITALIST

## 2018-01-11 PROCEDURE — 83735 ASSAY OF MAGNESIUM: CPT

## 2018-01-11 PROCEDURE — 80048 BASIC METABOLIC PNL TOTAL CA: CPT

## 2018-01-11 PROCEDURE — 94660 CPAP INITIATION&MGMT: CPT

## 2018-01-11 PROCEDURE — 82962 GLUCOSE BLOOD TEST: CPT | Mod: 91

## 2018-01-11 PROCEDURE — 36415 COLL VENOUS BLD VENIPUNCTURE: CPT

## 2018-01-11 PROCEDURE — 700111 HCHG RX REV CODE 636 W/ 250 OVERRIDE (IP): Performed by: INTERNAL MEDICINE

## 2018-01-11 PROCEDURE — 85025 COMPLETE CBC W/AUTO DIFF WBC: CPT

## 2018-01-11 PROCEDURE — 770020 HCHG ROOM/CARE - TELE (206)

## 2018-01-11 PROCEDURE — 700102 HCHG RX REV CODE 250 W/ 637 OVERRIDE(OP): Performed by: HOSPITALIST

## 2018-01-11 PROCEDURE — 70450 CT HEAD/BRAIN W/O DYE: CPT

## 2018-01-11 PROCEDURE — A9270 NON-COVERED ITEM OR SERVICE: HCPCS | Performed by: INTERNAL MEDICINE

## 2018-01-11 PROCEDURE — A9270 NON-COVERED ITEM OR SERVICE: HCPCS | Performed by: HOSPITALIST

## 2018-01-11 PROCEDURE — 85610 PROTHROMBIN TIME: CPT

## 2018-01-11 RX ORDER — FUROSEMIDE 20 MG/1
20 TABLET ORAL
Status: DISCONTINUED | OUTPATIENT
Start: 2018-01-11 | End: 2018-01-13

## 2018-01-11 RX ORDER — ASPIRIN 81 MG/1
324 TABLET, CHEWABLE ORAL ONCE
Status: COMPLETED | OUTPATIENT
Start: 2018-01-11 | End: 2018-01-11

## 2018-01-11 RX ORDER — ASPIRIN 81 MG/1
81 TABLET, CHEWABLE ORAL DAILY
Status: DISCONTINUED | OUTPATIENT
Start: 2018-01-12 | End: 2018-01-11

## 2018-01-11 RX ORDER — FUROSEMIDE 10 MG/ML
20 INJECTION INTRAMUSCULAR; INTRAVENOUS
Status: DISCONTINUED | OUTPATIENT
Start: 2018-01-12 | End: 2018-01-11

## 2018-01-11 RX ORDER — ASPIRIN 81 MG/1
324 TABLET, CHEWABLE ORAL DAILY
Status: DISCONTINUED | OUTPATIENT
Start: 2018-01-12 | End: 2018-01-11

## 2018-01-11 RX ORDER — ASPIRIN 81 MG/1
324 TABLET, CHEWABLE ORAL ONCE
Status: DISCONTINUED | OUTPATIENT
Start: 2018-01-11 | End: 2018-01-11

## 2018-01-11 RX ORDER — FUROSEMIDE 20 MG/1
20 TABLET ORAL
Status: DISCONTINUED | OUTPATIENT
Start: 2018-01-12 | End: 2018-01-11

## 2018-01-11 RX ADMIN — GABAPENTIN 600 MG: 300 CAPSULE ORAL at 16:06

## 2018-01-11 RX ADMIN — METFORMIN HYDROCHLORIDE 500 MG: 500 TABLET, FILM COATED ORAL at 09:23

## 2018-01-11 RX ADMIN — FUROSEMIDE 20 MG: 20 TABLET ORAL at 16:06

## 2018-01-11 RX ADMIN — TIOTROPIUM BROMIDE 1 CAPSULE: 18 CAPSULE ORAL; RESPIRATORY (INHALATION) at 09:36

## 2018-01-11 RX ADMIN — CARVEDILOL 6.25 MG: 6.25 TABLET, FILM COATED ORAL at 07:30

## 2018-01-11 RX ADMIN — METFORMIN HYDROCHLORIDE 500 MG: 500 TABLET, FILM COATED ORAL at 17:39

## 2018-01-11 RX ADMIN — GABAPENTIN 600 MG: 300 CAPSULE ORAL at 09:23

## 2018-01-11 RX ADMIN — ALLOPURINOL 300 MG: 300 TABLET ORAL at 09:23

## 2018-01-11 RX ADMIN — GABAPENTIN 600 MG: 300 CAPSULE ORAL at 21:22

## 2018-01-11 RX ADMIN — Medication 500 MG: at 09:22

## 2018-01-11 RX ADMIN — RIVAROXABAN 20 MG: 20 TABLET, FILM COATED ORAL at 17:39

## 2018-01-11 RX ADMIN — ASPIRIN 324 MG: 81 TABLET, CHEWABLE ORAL at 09:54

## 2018-01-11 RX ADMIN — FUROSEMIDE 20 MG: 10 INJECTION, SOLUTION INTRAMUSCULAR; INTRAVENOUS at 09:23

## 2018-01-11 ASSESSMENT — CHA2DS2 SCORE
HYPERTENSION: YES
VASCULAR DISEASE: NO
SEX: FEMALE
PRIOR STROKE OR TIA OR THROMBOEMBOLISM: NO
CHA2DS2 VASC SCORE: 5
AGE 65 TO 74: YES
CHF OR LEFT VENTRICULAR DYSFUNCTION: YES
AGE 75 OR GREATER: NO
DIABETES: YES

## 2018-01-11 ASSESSMENT — PAIN SCALES - GENERAL
PAINLEVEL_OUTOF10: 0

## 2018-01-11 ASSESSMENT — ENCOUNTER SYMPTOMS
DEPRESSION: 0
SHORTNESS OF BREATH: 0
CHILLS: 0
BLURRED VISION: 0
ABDOMINAL PAIN: 0
STRIDOR: 0
COUGH: 0
FEVER: 0
SPUTUM PRODUCTION: 0
NECK PAIN: 0
DIARRHEA: 0
HEADACHES: 0
NAUSEA: 0
MYALGIAS: 0
DIZZINESS: 0
PALPITATIONS: 0
DOUBLE VISION: 0
VOMITING: 0
SORE THROAT: 0

## 2018-01-11 ASSESSMENT — COPD QUESTIONNAIRES
COPD SCREENING SCORE: 7
DURING THE PAST 4 WEEKS HOW MUCH DID YOU FEEL SHORT OF BREATH: SOME OF THE TIME
DO YOU EVER COUGH UP ANY MUCUS OR PHLEGM?: YES, A FEW DAYS A WEEK OR MONTH
HAVE YOU SMOKED AT LEAST 100 CIGARETTES IN YOUR ENTIRE LIFE: YES

## 2018-01-11 ASSESSMENT — PATIENT HEALTH QUESTIONNAIRE - PHQ9
SUM OF ALL RESPONSES TO PHQ9 QUESTIONS 1 AND 2: 0
2. FEELING DOWN, DEPRESSED, IRRITABLE, OR HOPELESS: NOT AT ALL
1. LITTLE INTEREST OR PLEASURE IN DOING THINGS: NOT AT ALL
SUM OF ALL RESPONSES TO PHQ QUESTIONS 1-9: 0

## 2018-01-11 NOTE — PROGRESS NOTES
[facility]ist Progress Note    Date of Service: 2018    Chief Complaint  67 y.o. female admitted 2018 with sob    Interval Problem Update   Here for CHF with reduced EF 25%,Has hx of AICD,discussed case with  in detail   Elevated BNP but Low Bp, so caution with lasix use    This morning nurse noticed patient with slurred speech, I assessed patient and she was speaking fine but at times was slurred, she just said she was tired because didn't get good sleep last night. we ordered a stat CT which was neg, cbc normal, INR 2.54. No focal deficits, alert and oriented. Later during rounds spoke to her son who said that she usually does this where she speaks like she is drunk, he usually notices it in the mornings. She is getting xarelto already, added ASA 81mg this morning.       No Chest pains, no SOB, on home O2 at 2L this morning, LE edema, being treated for CHF with lasix cautiously since BP are marginally low at times.     Consultants/Specialty  cardiology    Disposition  home        Review of Systems   Constitutional: Positive for malaise/fatigue. Negative for chills and fever.   HENT: Positive for hearing loss. Negative for sore throat.    Eyes: Negative for blurred vision and double vision.   Respiratory: Negative for cough, sputum production, shortness of breath and stridor.    Cardiovascular: Negative for chest pain, palpitations and leg swelling.   Gastrointestinal: Negative for abdominal pain, diarrhea, nausea and vomiting.   Genitourinary: Negative for urgency.   Musculoskeletal: Negative for myalgias and neck pain.   Neurological: Negative for dizziness and headaches.   Psychiatric/Behavioral: Negative for depression.      Physical Exam  Laboratory/Imaging   Hemodynamics  Temp (24hrs), Av.1 °C (97 °F), Min:35.6 °C (96.1 °F), Max:36.9 °C (98.5 °F)   Temperature: (!) 35.6 °C (96.1 °F)  Pulse  Av.3  Min: 58  Max: 120   Blood Pressure : (!) 81/50      Respiratory      Respiration:  (!) 22, Pulse Oximetry: 94 %     Work Of Breathing / Effort: Moderate  RUL Breath Sounds: Clear, RML Breath Sounds: Crackles, RLL Breath Sounds: Diminished, SHUBHAM Breath Sounds: Crackles, LLL Breath Sounds: Crackles    Fluids    Intake/Output Summary (Last 24 hours) at 01/11/18 1334  Last data filed at 01/10/18 1817   Gross per 24 hour   Intake              120 ml   Output              340 ml   Net             -220 ml       Nutrition  Orders Placed This Encounter   Procedures   • Diet Order     Standing Status:   Standing     Number of Occurrences:   1     Order Specific Question:   Diet:     Answer:   2 Gram Sodium [7]     Order Specific Question:   Consistency/Fluid modifications:     Answer:   1500 ml Fluid Restriction [9]     Physical Exam   Constitutional: She is oriented to person, place, and time. No distress.   HENT:   Head: Normocephalic and atraumatic.   Effortful speaking, slight slurred but fully understandable   Eyes: Left eye exhibits no discharge.   Neck: Neck supple. No tracheal deviation present. No thyromegaly present.   Cardiovascular: Normal rate.  Exam reveals no gallop and no friction rub.    No murmur heard.  Pulmonary/Chest: She is in respiratory distress. She has no wheezes. She has no rales (bibasilar).   On home 2L O2 requirement, usually wears 2L at night but at times in the day as well depending how she feels   Abdominal: She exhibits no distension. There is no tenderness. There is no rebound and no guarding.   Musculoskeletal: Normal range of motion. She exhibits edema (trace both legs). She exhibits no deformity.   No focal deficits, strength is 5/5 in all 4 extremities.   Neurological: She is alert and oriented to person, place, and time. No cranial nerve deficit. Coordination normal.   Skin: No rash noted. She is not diaphoretic. No erythema. No pallor.   Psychiatric: She has a normal mood and affect.   No facial droop, no pronator drift. Normal DTRs globally       Recent Labs       01/09/18   0015  01/10/18   0821  01/11/18   0832   WBC  9.0  8.2  10.4   RBC  4.49  4.73  4.96   HEMOGLOBIN  14.0  14.6  15.4   HEMATOCRIT  41.1  43.4  44.6   MCV  91.5  91.8  89.9   MCH  31.2  30.9  31.0   MCHC  34.1  33.6  34.5   RDW  51.1*  51.7*  50.8*   PLATELETCT  195  214  251   MPV  11.1  11.0  11.0     Recent Labs      01/09/18   0015  01/09/18   2251  01/10/18   0821  01/11/18   0903   SODIUM  136   --   133*  131*   POTASSIUM  4.1  4.2  4.2  4.9   CHLORIDE  98   --   96  96   CO2  30   --   31  25   GLUCOSE  107*   --   103*  169*   BUN  27*   --   28*  31*   CREATININE  0.89   --   0.89  1.03   CALCIUM  9.5   --   9.5  9.6     Recent Labs      01/11/18   0903   INR  2.54*     Recent Labs      01/09/18   0015   BNPBTYPENAT  2817*              Assessment/Plan     * Acute on chronic systolic heart failure (CMS-HCC)   Assessment & Plan    BNP 2800   ef 25%  Lasix 20mg IV dialy  Coreg 6.25mgmg bid  acei    Discussed with Dr. Dillon in detail today about patients plan of care        Nonsustained ventricular tachycardia (CMS-HCC)   Assessment & Plan    Picked up by tele, patient asymptomatic  Cardiology on board          Acute on chronic respiratory failure (CMS-HCC)   Assessment & Plan    Likely due to CHF with elevated BNP, caution use of lasix given marginal BP  Cardiology Dr. Dillon aware; appreciate recs        DM type 2 (diabetes mellitus, type 2) (CMS-HCC)- (present on admission)   Assessment & Plan    Diabetic diet    fs with ssi    Cont metformin          A.fib, start Xarelto  Continue lasix as BP allows-->Goals to diurese 1L  Possible dc tomorrow      Reece catheter::  No Reece  DVT prophylaxis pharmacological::  Enoxaparin (Lovenox)

## 2018-01-11 NOTE — CARE PLAN
Problem: Safety  Goal: Will remain free from injury  Outcome: PROGRESSING AS EXPECTED    Intervention: Provide assistance with mobility  Walks into bathroom ad benson, steady on feet  Intervention: Collaborate with Interdisciplinary Team for safe transfer and mobilization techniques   01/10/18 1930   OTHER   Assistive Devices None         Problem: Respiratory:  Goal: Respiratory status will improve  Outcome: PROGRESSING AS EXPECTED    Intervention: Assess and monitor pulmonary status   01/10/18 1930 01/11/18 0300   Vitals   Respiration --  18   Pulse Oximetry --  96 %   OTHER   O2 (LPM) --  2   Respiratory Pattern Dyspnea with Exertion --    Work Of Breathing / Effort Mild;Moderate --    RUL Breath Sounds Clear --    RML Breath Sounds Diminished --    RLL Breath Sounds Diminished --    SHUBHAM Breath Sounds Clear --    LLL Breath Sounds Diminished --      Intervention: Administer and titrate oxygen therapy   01/11/18 0300   OTHER   O2 (LPM) 2

## 2018-01-11 NOTE — PROGRESS NOTES
Dr. Keller informed that labs and ct completed-awaiting result. Per Dr. Keller give lasix if sbp greater than 95. Pt c/o tiredness, intermittent dizziness. Monitor tech informed that pt back in room. Pt in ST occasional pacing 100s.

## 2018-01-11 NOTE — PROGRESS NOTES
Pt requested Ambien, provided.  Education provided about safety, bed alarm on, upper side rails up, hourly rounding in place

## 2018-01-11 NOTE — PROGRESS NOTES
"Per Dr. Keller, pt not to have MRI due to pacemaker. Dr. Keller spoke this am with pt's son at bedside. He reports that pt always \"talks like she is drunk\" in the morning.   "

## 2018-01-11 NOTE — PROGRESS NOTES
Pt reporting some dizziness. This nurse observed thick, slow speech. Pt with equal strengths. Dr. Keller in to assess pt. Order received for stat head ct and for stat labs. Radiology and lab notified. Per Dr. Keller, no need to call rapid stroke-charge Rn informed. Per Dr. Keller hold coreg, lasix aspirin pending results of ct.

## 2018-01-11 NOTE — HEART FAILURE PROGRAM
"Heart Failure Education  Heart Failure packet provided by primary RN. Education performed by HF RN on this date.     Education Narrative  Reviewed anatomy and physiology of heart failure with patient and family. Went over heart failure booklet and calendar and patient's individual HF diagnosis, EF, risk factors, general medication classes and indications, as well as personal goals.  Goals: Patient's primary goal is to reduce fatigue and SOB, as well as improve exercise tolerance.     Discussed daily weights, sodium restriction, worsening signs and symptoms to report to physician, heart medications, and importance of adherence to medication regimen. Emphasized recommendation from AHA/AAHFN to keep daily sodium intake between 1500mg-2000mg.     Reviewed heart healthy diet and discussed foods to limit or avoid. Also discussed healthier food choices and healthier options when eating out. Also reminded patient to be aware of sugar intake as well, as she is diabetic.    Discussed trajectory of heart failure and specific stages of heart failure; specifically that cardiologist has concerns patient may be moving from Stage C to Stage D. Discussed what Stage D looks like and why it is important to manage heart failure to possibly prevent future exacerbations-though it was explained that exacerbations can happen despite careful management.     Patient desires to manage her heart failure more carefully in the future and has expressed she wishes \"to do everything right\" in the future. She has requested some form of PT/OT at home; this nurse informed Celena Nurse Navigator who will inform the primary RN.    This also led to a discussion about advance directives and other forms of advanced care planning. Family states they have something on file, but are not able to be specific. Promised patient this will be further discussed at heart failure appointment after discharge.     Patient and family had questions about having a " "Cardiomems implanted, Dr. Betancourt brought this up at last appointment in December. Discussed Medicare requirements for coverage of procedure as well as indications for implant. Explained to patient that Cardiomems is not indicated for Stage D Heart Failure and that MD is concerned she may be close to this stage. Explained it may not be as effective at this point and that it would not be good to go through a procedure that is not indicated and may not be covered-patient and family agree not to proceed at this time.     Invited patient and family members/friends to HF support group and encouraged patient to call Heart Failure clinic during normal business hours with any questions.    Heart Failure number given to patient.        Patient and family state full understanding of all information given.     KAREN Johnson RN  [phone]    Please Note: Pt must have an appointment scheduled within 7 days of discharge (PCP or Cards). Call [phone] to arrange.  Also, when completing the after visit summary (discharge instructions) please select \"Cardiac Diagnosis, and Heart Failure\" in the special instructions section so that the heart failure discharge instructions will populate.   Thank you and please call [facility] with any questions: [phone].   "

## 2018-01-11 NOTE — PROGRESS NOTES
Cardiology Follow-up Consult Note    Date of note:    1/11/2018      Consulting Physician: Celena Keller M.D.    Patient ID:    Name:   Isatu Dang   YOB: 1950  Age:   67 y.o.  female   MRN:   [id number]      Reason for Consultation: CHF    HPI/Patient ID:    Isatu Dang is a 67 y.o.-year-old female with a history of Stage C left sided systolic heart failure (EF 25%), Medtronic CRT-D, severe functional mitral regurgitation,  hypertension, hypercholesterolemia who presents with SOB     She was recently seen in clinic by Dr. Betancourt on 12/15/17, she is awaiting implantable PA monitor and enrollment in the [employer] HF trial.  After that visit, she left for [place].  She presented on 1/7/2018 with worsening SOB, orthopnea, PND, and LE swelling for 1 week in the setting of possible sodium dietary indescretion. BNP was elevated at 2800.       Monitor review has shown 100% paced beats, multiple PVCs and ectopic beats, and short runs of NSVT (asymptomatic) as well as SVT. She has also been hypotensive, cardiology was consulted for assistance in management.      Currently she is asymptomatic except for abdominal pain.       CRT-D device interrogated and is working well.  It did show multiple episodes of atrial fibrillation for >30 hours back in September.  She was unaware of this event.  She is not on anticoagulation.  She denies history of diabetes, CVA, or PVD.     Interim Events:  # tele reviewed, occasional NSVT, PVCs  # no change in weight, negative 280ccs charted.   # dizziness and slow speech last night, stat CT head ordered  # Patient denies chest pain, palpitations, lightheadedness, melena, BRBPR, hematemesis, cough, new numbness, tingling, or focal weakness.       Medications: Reviewed in MAR      Allergies   Allergen Reactions   • Tape Contact Dermatitis   • Ace Inhibitors Cough     cough           Physical Exam  Body mass index is 29.03 kg/m².  Blood pressure (!) 97/67, pulse (!) 120,  "temperature (!) 35.7 °C (96.2 °F), resp. rate (!) 27, height 1.575 m (5' 2\"), weight 72 kg (158 lb 11.7 oz), last menstrual period 11/05/1994, SpO2 100 %.  Vitals:    01/11/18 0300 01/11/18 0753 01/11/18 0754 01/11/18 0900   BP: (!) 93/55 (!) 76/64 (!) 91/57 (!) 97/67   Pulse: 97 (!) 103 (!) 108 (!) 120   Resp: 18  (!) 27    Temp: 35.8 °C (96.5 °F)  (!) 35.7 °C (96.2 °F)    SpO2: 96%  98% 100%   Weight:       Height:         Oxygen Therapy:  Pulse Oximetry: 100 %, O2 (LPM): 2, O2 Delivery: Silicone Nasal Cannula    General: Well appearing and in no apparent distress  Eyes: nl conjunctiva  ENT: OP clear  Neck: JVP 15 cm H2O, no carotid bruits  Lungs: normal respiratory effort, bibasilar crackles  Heart: RRR, 2/6 systolic murmur at apex, no rubs or gallops, 2+ edema bilateral lower extremities. No LV/RV heave on cardiac palpatation. 2+ bilateral radial pulses.  2+ bilateral dp pulses.   Abdomen: soft, non tender, non distended, no masses, normal bowel sounds.  No HSM.  Extremities/MSK: no clubbing, no cyanosis  Neurological: No focal sensory deficits  Psychiatric: Appropriate affect, A/O x 3  Skin: Warm extremities    Labs (personally reviewed and notable for):   Lab Results   Component Value Date/Time    SODIUM 131 (L) 01/11/2018 09:03 AM    POTASSIUM 4.9 01/11/2018 09:03 AM    CHLORIDE 96 01/11/2018 09:03 AM    CO2 25 01/11/2018 09:03 AM    GLUCOSE 169 (H) 01/11/2018 09:03 AM    BUN 31 (H) 01/11/2018 09:03 AM    CREATININE 1.03 01/11/2018 09:03 AM    CREATININE 0.7 03/25/2008 10:20 AM      Lab Results   Component Value Date/Time    WBC 10.4 01/11/2018 08:32 AM    RBC 4.96 01/11/2018 08:32 AM    HEMOGLOBIN 15.4 01/11/2018 08:32 AM    HEMATOCRIT 44.6 01/11/2018 08:32 AM    MCV 89.9 01/11/2018 08:32 AM    MCH 31.0 01/11/2018 08:32 AM    MCHC 34.5 01/11/2018 08:32 AM    MPV 11.0 01/11/2018 08:32 AM    NEUTSPOLYS 66.20 01/11/2018 08:32 AM    LYMPHOCYTES 18.10 (L) 01/11/2018 08:32 AM    MONOCYTES 6.90 01/11/2018 08:32 AM "    EOSINOPHILS 6.60 01/11/2018 08:32 AM    BASOPHILS 1.30 01/11/2018 08:32 AM        BNP 1/7/2018 - 2800  Trop 0.05  Creatinine 0.9      Cardiac Imaging and Procedures Review:    EKG dated 1/7/2018: My personal interpretation is A sensed, v paced at 122     Echo dated 12/18/2017:  Left Ventricle  Left ventricle is moderately dilated. Normal left ventricular wall   thickness. Severely reduced left ventricular systolic function. Left   ventricular ejection fraction is visually estimated to be 25%.   Hypokinesis of the inferior and lateral walls. Contractility is best   preserved in the anterior wall and the septum.    Right Ventricle  Normal right ventricular size and systolic function.    Right Atrium  Normal right atrial size. Dilated inferior vena cava with inspiratory   collapse.    Left Atrium  Severely dilated left atrium. Left atrial volume index is 54 mL/sq m.    Mitral Valve  Severe, functional mitral regurgitation. ERO by PISA method is 0.4 sq   cm. The PISA radius is 0.8 cm.    Aortic Valve  Aortic sclerosis without stenosis. Moderate (borderline severe) aortic   insufficiency. Vena contracta is 0.6 cm.    Tricuspid Valve  Moderate tricuspid regurgitation. Estimated right ventricular systolic   pressure  is 60 mmHg.    Pulmonic Valve  The pulmonic valve is not well visualized. No stenosis or regurgitation   seen.    Pericardium  No pericardial effusion seen.    Aorta  Normal ascending aorta.     [facility] (2011):   FINDINGS:  1.  Sinus rhythm with ventricular pacing and PVCs.  2.  The left ventricular pressure is 106/7 end diastolic.  After left  ventricular cineangiogram, the EDP was 13.  Systemic pressure was low-  normal throughout procedure, typically 98/48.  There was no gradient  across the left ventricular outflow tract.  3.  Fluoroscopy demonstrates endocardial lead in the right ventricle.  Fluoroscopy demonstrates no intracardiac calcification.  4.  Selective coronary arteriograms.  A.  Left main:  This  vessel appears normal.  B.  Left anterior descending:  The LAD gives off one diagonal branch as  well as septal perforators and terminates basically at the apex of the  left ventricle.  Slight tortuosity in the midportion of the LAD.  No  flow limiting lesions.  No definitive plaquing.  C.  Ramus intermedius is a very small vessel and appears normal.  D.  Circumflex coronary artery is a nondominant vessel and consists of a  series of obtuse marginal vessels and a vestigial vessel in AV sulcus.  No lesions are noted in this nondominant system.  E.  Right coronary artery is a dominant vessel.  Gives off pulmonary  conus branch and posterior descending coronary artery and posterolateral  branch and AV node artery.  This dominant vessel has no lesions within  it.  5.  Left ventricular cineangiogram:  The left ventricle is enlarged and  globular in shape.  Severe diffuse hypokinesis is noted.  The ejection  fraction is measured at 25% and this agrees with the subjective  appearance of the contractility. The apex is well-visualized.  No  evidence of mural thrombus. No mitral regurgitation.     IMPRESSION  1.  Near-normal coronary arteriograms.  2.  Dilated globular left ventricle with diffuse hypokinesis and  ejection fraction of 25%.  3.  Normal left heart pressures at rest.        Radiology test Review:  CXR:   1.  Slightly improvement of diffuse interstitial edema pattern.    2.  Persistent cardiomegaly, mild interstitial edema, bibasilar atelectasis, and bilateral pleural effusions.      Head Ct:   1.  No evidence of acute intracranial process.    2.  Minimal atrophy.    3.  Old left basal ganglia lacunar infarct.     Impression and Medical Decision Making:  # Acute on chronic left sided systolic heart failure, EF 25%, non-ischemic  # Severe mitral regurgitation  # Moderate aortic regurgitation  # history of Medtronic CRT-D placement, original RV lead placed 1998  # h/o lung adenocarcinoma, T2N0M0  # Low grade NHL  follicular lymphoma, 2012, followed by Dr. Nina Rodríguez, awaiting PET/CT and treatment  # Diabetes   # Paroxysmal atrial fibrillation  # AMS, head CT negative, MRI pending.      Recommendations:  # Continue diuresis, increase lasix to 20mg IV bid, once euvolemic, will transition to PO for one day prior to discharge.  # Discussed with CHF RN and Dr. Betancourt about possible PA catheter placement, given severity of her CHF, decided not to place for now.   # switch coreg to metoprolol 12.5mg PO bid due to symptomatic hypotension.   # continue entresto  # DC'd aspirin, cont rivaroxaban 20mg PO daily for atrial fibrillation  # will restart home spironolactone if stable overnight     Discussed above recommendations with Dr. Keller.     Thank you for allowing me to participate in the care of this patient, I will continue to follow.  Please contact me with any questions.      Gilberto Dillon MD  Cardiologist, [facility]   [phone]

## 2018-01-12 ENCOUNTER — APPOINTMENT (OUTPATIENT)
Dept: RADIOLOGY | Facility: MEDICAL CENTER | Age: 68
DRG: 291 | End: 2018-01-12
Attending: INTERNAL MEDICINE
Payer: MEDICARE

## 2018-01-12 PROBLEM — I48.91 A-FIB (HCC): Status: ACTIVE | Noted: 2018-01-12

## 2018-01-12 PROBLEM — Z71.89 GOALS OF CARE, COUNSELING/DISCUSSION: Status: ACTIVE | Noted: 2018-01-12

## 2018-01-12 LAB
ANION GAP SERPL CALC-SCNC: 10 MMOL/L (ref 0–11.9)
BNP SERPL-MCNC: 4973 PG/ML (ref 0–100)
BUN SERPL-MCNC: 40 MG/DL (ref 8–22)
CALCIUM SERPL-MCNC: 9.7 MG/DL (ref 8.5–10.5)
CHLORIDE SERPL-SCNC: 95 MMOL/L (ref 96–112)
CO2 SERPL-SCNC: 27 MMOL/L (ref 20–33)
CREAT SERPL-MCNC: 1.18 MG/DL (ref 0.5–1.4)
GLUCOSE BLD-MCNC: 110 MG/DL (ref 65–99)
GLUCOSE BLD-MCNC: 123 MG/DL (ref 65–99)
GLUCOSE BLD-MCNC: 135 MG/DL (ref 65–99)
GLUCOSE BLD-MCNC: 155 MG/DL (ref 65–99)
GLUCOSE SERPL-MCNC: 146 MG/DL (ref 65–99)
MAGNESIUM SERPL-MCNC: 2 MG/DL (ref 1.5–2.5)
POTASSIUM SERPL-SCNC: 4.4 MMOL/L (ref 3.6–5.5)
SODIUM SERPL-SCNC: 132 MMOL/L (ref 135–145)

## 2018-01-12 PROCEDURE — 83735 ASSAY OF MAGNESIUM: CPT

## 2018-01-12 PROCEDURE — 71045 X-RAY EXAM CHEST 1 VIEW: CPT

## 2018-01-12 PROCEDURE — A9270 NON-COVERED ITEM OR SERVICE: HCPCS | Performed by: INTERNAL MEDICINE

## 2018-01-12 PROCEDURE — 99233 SBSQ HOSP IP/OBS HIGH 50: CPT | Performed by: HOSPITALIST

## 2018-01-12 PROCEDURE — A9270 NON-COVERED ITEM OR SERVICE: HCPCS | Performed by: HOSPITALIST

## 2018-01-12 PROCEDURE — 82962 GLUCOSE BLOOD TEST: CPT | Mod: 91

## 2018-01-12 PROCEDURE — 700102 HCHG RX REV CODE 250 W/ 637 OVERRIDE(OP): Performed by: STUDENT IN AN ORGANIZED HEALTH CARE EDUCATION/TRAINING PROGRAM

## 2018-01-12 PROCEDURE — 700102 HCHG RX REV CODE 250 W/ 637 OVERRIDE(OP): Performed by: HOSPITALIST

## 2018-01-12 PROCEDURE — 700102 HCHG RX REV CODE 250 W/ 637 OVERRIDE(OP): Performed by: INTERNAL MEDICINE

## 2018-01-12 PROCEDURE — 770020 HCHG ROOM/CARE - TELE (206)

## 2018-01-12 PROCEDURE — 80048 BASIC METABOLIC PNL TOTAL CA: CPT

## 2018-01-12 PROCEDURE — 83880 ASSAY OF NATRIURETIC PEPTIDE: CPT

## 2018-01-12 PROCEDURE — 94660 CPAP INITIATION&MGMT: CPT

## 2018-01-12 PROCEDURE — 36415 COLL VENOUS BLD VENIPUNCTURE: CPT

## 2018-01-12 PROCEDURE — A9270 NON-COVERED ITEM OR SERVICE: HCPCS | Performed by: STUDENT IN AN ORGANIZED HEALTH CARE EDUCATION/TRAINING PROGRAM

## 2018-01-12 RX ORDER — MECLIZINE HYDROCHLORIDE 25 MG/1
12.5 TABLET ORAL 3 TIMES DAILY PRN
Status: DISCONTINUED | OUTPATIENT
Start: 2018-01-12 | End: 2018-01-15 | Stop reason: HOSPADM

## 2018-01-12 RX ORDER — DIPHENHYDRAMINE HCL 25 MG
25 TABLET ORAL NIGHTLY PRN
Status: DISCONTINUED | OUTPATIENT
Start: 2018-01-12 | End: 2018-01-15 | Stop reason: HOSPADM

## 2018-01-12 RX ORDER — TRAZODONE HYDROCHLORIDE 50 MG/1
50 TABLET ORAL
Status: DISCONTINUED | OUTPATIENT
Start: 2018-01-12 | End: 2018-01-15 | Stop reason: HOSPADM

## 2018-01-12 RX ORDER — MECLIZINE HYDROCHLORIDE 25 MG/1
12.5 TABLET ORAL 3 TIMES DAILY PRN
Status: DISCONTINUED | OUTPATIENT
Start: 2018-01-12 | End: 2018-01-12

## 2018-01-12 RX ADMIN — RIVAROXABAN 20 MG: 20 TABLET, FILM COATED ORAL at 17:26

## 2018-01-12 RX ADMIN — DIPHENHYDRAMINE HCL 25 MG: 25 TABLET ORAL at 00:59

## 2018-01-12 RX ADMIN — METFORMIN HYDROCHLORIDE 500 MG: 500 TABLET, FILM COATED ORAL at 07:51

## 2018-01-12 RX ADMIN — ALBUTEROL SULFATE 2 PUFF: 90 AEROSOL, METERED RESPIRATORY (INHALATION) at 04:13

## 2018-01-12 RX ADMIN — TIOTROPIUM BROMIDE 1 CAPSULE: 18 CAPSULE ORAL; RESPIRATORY (INHALATION) at 08:38

## 2018-01-12 RX ADMIN — GABAPENTIN 600 MG: 300 CAPSULE ORAL at 21:00

## 2018-01-12 RX ADMIN — SACUBITRIL AND VALSARTAN 1 TABLET: 24; 26 TABLET, FILM COATED ORAL at 08:29

## 2018-01-12 RX ADMIN — INSULIN HUMAN 2 UNITS: 100 INJECTION, SOLUTION PARENTERAL at 06:14

## 2018-01-12 RX ADMIN — FUROSEMIDE 20 MG: 20 TABLET ORAL at 05:34

## 2018-01-12 RX ADMIN — ALLOPURINOL 300 MG: 300 TABLET ORAL at 08:29

## 2018-01-12 RX ADMIN — METOPROLOL TARTRATE 12.5 MG: 25 TABLET, FILM COATED ORAL at 21:00

## 2018-01-12 RX ADMIN — METFORMIN HYDROCHLORIDE 500 MG: 500 TABLET, FILM COATED ORAL at 17:21

## 2018-01-12 RX ADMIN — Medication 500 MG: at 08:29

## 2018-01-12 RX ADMIN — GABAPENTIN 600 MG: 300 CAPSULE ORAL at 14:52

## 2018-01-12 RX ADMIN — FUROSEMIDE 20 MG: 20 TABLET ORAL at 15:33

## 2018-01-12 RX ADMIN — METOPROLOL TARTRATE 12.5 MG: 25 TABLET, FILM COATED ORAL at 08:30

## 2018-01-12 RX ADMIN — ACETAMINOPHEN 650 MG: 325 TABLET, FILM COATED ORAL at 14:52

## 2018-01-12 RX ADMIN — TRAZODONE HYDROCHLORIDE 50 MG: 50 TABLET ORAL at 21:00

## 2018-01-12 RX ADMIN — GABAPENTIN 600 MG: 300 CAPSULE ORAL at 08:29

## 2018-01-12 RX ADMIN — ACETAMINOPHEN 650 MG: 325 TABLET, FILM COATED ORAL at 07:51

## 2018-01-12 ASSESSMENT — COGNITIVE AND FUNCTIONAL STATUS - GENERAL
MOBILITY SCORE: 24
SUGGESTED CMS G CODE MODIFIER DAILY ACTIVITY: CH
SUGGESTED CMS G CODE MODIFIER MOBILITY: CH
DAILY ACTIVITIY SCORE: 24

## 2018-01-12 ASSESSMENT — ENCOUNTER SYMPTOMS
MYALGIAS: 0
SORE THROAT: 0
BLURRED VISION: 0
ABDOMINAL PAIN: 0
STRIDOR: 0
DIZZINESS: 0
NAUSEA: 0
CHILLS: 0
DEPRESSION: 0
SPUTUM PRODUCTION: 0
VOMITING: 0
DOUBLE VISION: 0
FEVER: 0
HEADACHES: 0
PALPITATIONS: 0
SHORTNESS OF BREATH: 1
WEAKNESS: 1
COUGH: 0
NECK PAIN: 0
DIARRHEA: 0

## 2018-01-12 ASSESSMENT — PAIN SCALES - GENERAL
PAINLEVEL_OUTOF10: 0
PAINLEVEL_OUTOF10: 0
PAINLEVEL_OUTOF10: 4
PAINLEVEL_OUTOF10: 0

## 2018-01-12 NOTE — CARE PLAN
Problem: Infection  Goal: Will remain free from infection  Outcome: PROGRESSING AS EXPECTED      Problem: Bowel/Gastric:  Goal: Normal bowel function is maintained or improved  Outcome: MET Date Met: 01/12/18    Goal: Will not experience complications related to bowel motility  Outcome: MET Date Met: 01/12/18      Problem: Knowledge Deficit  Goal: Knowledge of the prescribed therapeutic regimen will improve  Outcome: PROGRESSING AS EXPECTED      Problem: Knowledge Deficit:  Goal: Knowledge of the prescribed therapeutic regimen will improve  Outcome: PROGRESSING AS EXPECTED

## 2018-01-12 NOTE — PALLIATIVE CARE
PALLIATIVE CARE:    Plan:  Meeting scheduled with pt and  for 10am tomorrow (Saturday).    Thank you for allowing Palliative Care to support this pt and her family.  Contact [id number] for additional assistance, questions or concerns.

## 2018-01-12 NOTE — PROGRESS NOTES
[facility] Hospitalist Progress Note    Date of Service: 2018    Chief Complaint  67 y.o. female admitted 2018 with fluid overload    Interval Problem Update  She was recently seen in clinic by Dr. Betancourt on 12/15/17, she is awaiting implantable PA monitor and enrollment in the [employer] HF trial.    >Here for CHF with reduced EF 25%,Has hx of AICD,Elevated BNP 2800 has been getting diuresed as BP tolerates , however continues to feel fatigued, lethargic. Insomnia. Is on 2L [place], states she usually wears 2L at night at home and sometimes puts it on at home when she needs to. BP are in 80-90s SBP, MAP >60    >Dr. Dillon is on board and has been adjusting her cardiac medications. CRT-D device interrogated and is working well.  It did show multiple episodes of atrial fibrillation for >30 hours back in September.  She was unaware of this event.  She is not on anticoagulation.  > patient started on xarelto in house      Consultants/Specialty  cardiology    Disposition  home        Review of Systems   Constitutional: Positive for malaise/fatigue. Negative for chills and fever.   HENT: Positive for hearing loss. Negative for sore throat.    Eyes: Negative for blurred vision and double vision.   Respiratory: Positive for shortness of breath. Negative for cough, sputum production and stridor.    Cardiovascular: Negative for chest pain, palpitations and leg swelling.   Gastrointestinal: Negative for abdominal pain, diarrhea, nausea and vomiting.   Genitourinary: Negative for urgency.   Musculoskeletal: Negative for myalgias and neck pain.   Neurological: Positive for weakness. Negative for dizziness and headaches.   Psychiatric/Behavioral: Negative for depression.      Physical Exam  Laboratory/Imaging   Hemodynamics  Temp (24hrs), Av.1 °C (97 °F), Min:35.8 °C (96.5 °F), Max:36.6 °C (97.8 °F)   Temperature: 35.8 °C (96.5 °F)  Pulse  Av.8  Min: 58  Max: 120 Heart Rate (Monitored): 96  Blood Pressure : 105/59       Respiratory      Respiration: 18, Pulse Oximetry: 100 %     Work Of Breathing / Effort: Moderate  RUL Breath Sounds: Clear, RML Breath Sounds: Diminished, RLL Breath Sounds: Diminished, SHUBHAM Breath Sounds: Clear, LLL Breath Sounds: Diminished    Fluids    Intake/Output Summary (Last 24 hours) at 01/12/18 1436  Last data filed at 01/12/18 0750   Gross per 24 hour   Intake              358 ml   Output              800 ml   Net             -442 ml       Nutrition  Orders Placed This Encounter   Procedures   • Diet Order     Standing Status:   Standing     Number of Occurrences:   1     Order Specific Question:   Diet:     Answer:   2 Gram Sodium [7]     Order Specific Question:   Consistency/Fluid modifications:     Answer:   1500 ml Fluid Restriction [9]     Physical Exam   Constitutional: She is oriented to person, place, and time. No distress.   Tired, lethargic   HENT:   Head: Normocephalic and atraumatic.   Effortful speaking, slight slurred but fully understandable   Eyes: Left eye exhibits no discharge.   Neck: Neck supple. No tracheal deviation present. No thyromegaly present.   Cardiovascular: Normal rate.  Exam reveals no gallop and no friction rub.    Murmur heard.  Pulmonary/Chest: She is in respiratory distress. She has no wheezes. She has no rales (bibasilar).   On home 2L O2 requirement, usually wears 2L at night but at times in the day as well depending how she feels   Abdominal: She exhibits no distension. There is no tenderness. There is no rebound and no guarding.   Musculoskeletal: Normal range of motion. She exhibits edema (trace both legs). She exhibits no deformity.   No focal deficits, strength is 5/5 in all 4 extremities.   Neurological: She is alert and oriented to person, place, and time. No cranial nerve deficit. Coordination normal.   Skin: No rash noted. She is not diaphoretic. No erythema. No pallor.   Psychiatric: She has a normal mood and affect.   No facial droop, no pronator drift.  Normal DTRs globally       Recent Labs      01/10/18   0821  01/11/18   0832   WBC  8.2  10.4   RBC  4.73  4.96   HEMOGLOBIN  14.6  15.4   HEMATOCRIT  43.4  44.6   MCV  91.8  89.9   MCH  30.9  31.0   MCHC  33.6  34.5   RDW  51.7*  50.8*   PLATELETCT  214  251   MPV  11.0  11.0     Recent Labs      01/10/18   0821  01/11/18   0903  01/12/18   0353   SODIUM  133*  131*  132*   POTASSIUM  4.2  4.9  4.4   CHLORIDE  96  96  95*   CO2  31  25  27   GLUCOSE  103*  169*  146*   BUN  28*  31*  40*   CREATININE  0.89  1.03  1.18   CALCIUM  9.5  9.6  9.7     Recent Labs      01/11/18   0903   INR  2.54*     Recent Labs      01/12/18   0938   BNPBTYPENAT  4973*              Assessment/Plan     * Acute on chronic systolic heart failure (CMS-HCC)   Assessment & Plan    BNP 2800; echo with ef 25%  Lasix 20mg po bid;  appears close to euvolemic  Coreg 6.25mg--> metoprolol 12.5mg bid due to symptomatic hypotension and fatigu     This is concerning as she is having worsening low output heart failure despite euvolemia and adequate diuresis. Patient remains in guarded condition. Poor prognostic sign. Will consult palliative           A-fib (CMS-HCC)   Assessment & Plan    Start xarelto  Will involve SW         Nonsustained ventricular tachycardia (CMS-HCC)   Assessment & Plan    Picked up by tele, patient asymptomatic  Cardiology on board          Acute on chronic respiratory failure (CMS-HCC)   Assessment & Plan    Likely due to CHF with elevated BNP, caution use of lasix given marginal BP  Discussed with Dr. Dillon, will obtain CXR to further evaluate ongoing hypoxemia.         DM type 2 (diabetes mellitus, type 2) (CMS-HCC)- (present on admission)   Assessment & Plan    Diabetic diet    fs with ssi    Cont metformin          A.fib, on xarelto  Decreased lasix to PO 20mg BID  Switch coreg--> metoprolol 12.5mg bid  Palliative consulted    I spent a total of 40 minutes of time during this clinical encounter of which > 50% was devoted  to counseling and coordinating care including review of records, pertinent lab data and studies, as well as discussing diagnostic evaluation and work up, planned therapeutic interventions and future disposition of care. Where indicated, the assessment and plan reflect discussion of patient with consultants, other healthcare providers, family members, and additional research needed to obtain further information in formulating the plan of care of this patient. This time includes no procedures or overlap with other providers.        Reece catheter::  No Reece  DVT: xarelto.

## 2018-01-12 NOTE — CARE PLAN
Problem: Venous Thromboembolism (VTW)/Deep Vein Thrombosis (DVT) Prevention:  Goal: Patient will participate in Venous Thrombosis (VTE)/Deep Vein Thrombosis (DVT)Prevention Measures  Patient on xarelto     Problem: Fluid Volume:  Goal: Risk for excess fluid volume will decrease    Intervention: Monitor patient intake and output  Monitoring intake and output, hat in bathroom

## 2018-01-12 NOTE — PROGRESS NOTES
Pt Ventricular paced with occasional pvcs, and 4-6 beats run pvcs. . Pt asymptomatic during pvc runs. Pt with new medication changes today, coreg discontinued and pt to be started on metoprolol. Bps still on low side 70-80s sbp, pt tolerating well and Dr. Keller informed and aware of runs pvcs and low bps.

## 2018-01-12 NOTE — PROGRESS NOTES
Cardiology Follow-up Consult Note    Date of note:    1/12/2018      Consulting Physician: Celena Keller M.D.    Patient ID:    Name:   Isatu Dang   YOB: 1950  Age:   67 y.o.  female   MRN:   [id number]      Reason for Consultation: CHF    HPI/Patient ID:    Isatu Dang is a 67 y.o.-year-old female with a history of Stage C left sided systolic heart failure (EF 25%), Medtronic CRT-D, severe functional mitral regurgitation,  hypertension, hypercholesterolemia who presents with SOB     She was recently seen in clinic by Dr. Betancourt on 12/15/17, she is awaiting implantable PA monitor and enrollment in the [employer] HF trial.  After that visit, she left for [place].  She presented on 1/7/2018 with worsening SOB, orthopnea, PND, and LE swelling for 1 week in the setting of possible sodium dietary indescretion. BNP was elevated at 2800.       Monitor review has shown 100% paced beats, multiple PVCs and ectopic beats, and short runs of NSVT (asymptomatic) as well as SVT. She has also been hypotensive, cardiology was consulted for assistance in management.      Currently she is asymptomatic except for abdominal pain.       CRT-D device interrogated and is working well.  It did show multiple episodes of atrial fibrillation for >30 hours back in September.  She was unaware of this event.  She is not on anticoagulation.  She denies history of diabetes, CVA, or PVD.     Interim Events:  # tele reviewed, occasional NSVT, PVCs  # no change in weight, negative 800ccs charted.   # continue extreme lethargy, lightheadedness and shortness of breath. She has severe insomnia.   # Patient denies chest pain, palpitations, melena, BRBPR, hematemesis, cough, new numbness, tingling, or focal weakness.       Medications: Reviewed in MAR      Allergies   Allergen Reactions   • Tape Contact Dermatitis   • Ace Inhibitors Cough     cough           Physical Exam  Body mass index is 28.83 kg/m².  Blood pressure 105/59, pulse  "(!) 110, temperature 35.8 °C (96.5 °F), resp. rate 18, height 1.575 m (5' 2\"), weight 71.5 kg (157 lb 10.1 oz), last menstrual period 11/05/1994, SpO2 100 %.  Vitals:    01/12/18 0242 01/12/18 0400 01/12/18 0654 01/12/18 0725   BP:  (!) 91/60  105/59   Pulse:  (!) 110  (!) 110   Resp:  16  18   Temp:  35.9 °C (96.7 °F)  35.8 °C (96.5 °F)   SpO2: 97% 99% 97% 100%   Weight:       Height:         Oxygen Therapy:  Pulse Oximetry: 100 %, O2 (LPM): 2, O2 Delivery: Silicone Nasal Cannula    General: Anxious, looks ill.   Eyes: nl conjunctiva  ENT: OP clear  Neck: JVP <8 cm H2O, no carotid bruits  Lungs: normal respiratory effort, bibasilar crackles  Heart: RRR, 2/6 systolic murmur at apex, no rubs or gallops, trace edema bilateral lower extremities. No LV/RV heave on cardiac palpatation. 2+ bilateral radial pulses.  2+ bilateral dp pulses.   Abdomen: soft, non tender, non distended, no masses, normal bowel sounds.  No HSM.  Extremities/MSK: no clubbing, no cyanosis  Neurological: No focal sensory deficits  Psychiatric: Appropriate affect, A/O x 3  Skin: Warm extremities    Labs (personally reviewed and notable for):   Lab Results   Component Value Date/Time    SODIUM 132 (L) 01/12/2018 03:53 AM    POTASSIUM 4.4 01/12/2018 03:53 AM    CHLORIDE 95 (L) 01/12/2018 03:53 AM    CO2 27 01/12/2018 03:53 AM    GLUCOSE 146 (H) 01/12/2018 03:53 AM    BUN 40 (H) 01/12/2018 03:53 AM    CREATININE 1.18 01/12/2018 03:53 AM    CREATININE 0.7 03/25/2008 10:20 AM      Lab Results   Component Value Date/Time    WBC 10.4 01/11/2018 08:32 AM    RBC 4.96 01/11/2018 08:32 AM    HEMOGLOBIN 15.4 01/11/2018 08:32 AM    HEMATOCRIT 44.6 01/11/2018 08:32 AM    MCV 89.9 01/11/2018 08:32 AM    MCH 31.0 01/11/2018 08:32 AM    MCHC 34.5 01/11/2018 08:32 AM    MPV 11.0 01/11/2018 08:32 AM    NEUTSPOLYS 66.20 01/11/2018 08:32 AM    LYMPHOCYTES 18.10 (L) 01/11/2018 08:32 AM    MONOCYTES 6.90 01/11/2018 08:32 AM    EOSINOPHILS 6.60 01/11/2018 08:32 AM    " BASOPHILS 1.30 01/11/2018 08:32 AM        BNP 1/7/2018 - 2800  Trop 0.05  Creatinine 0.9      Cardiac Imaging and Procedures Review:    EKG dated 1/7/2018: My personal interpretation is A sensed, v paced at 122     Echo dated 12/18/2017:  Left Ventricle  Left ventricle is moderately dilated. Normal left ventricular wall   thickness. Severely reduced left ventricular systolic function. Left   ventricular ejection fraction is visually estimated to be 25%.   Hypokinesis of the inferior and lateral walls. Contractility is best   preserved in the anterior wall and the septum.    Right Ventricle  Normal right ventricular size and systolic function.    Right Atrium  Normal right atrial size. Dilated inferior vena cava with inspiratory   collapse.    Left Atrium  Severely dilated left atrium. Left atrial volume index is 54 mL/sq m.    Mitral Valve  Severe, functional mitral regurgitation. ERO by PISA method is 0.4 sq   cm. The PISA radius is 0.8 cm.    Aortic Valve  Aortic sclerosis without stenosis. Moderate (borderline severe) aortic   insufficiency. Vena contracta is 0.6 cm.    Tricuspid Valve  Moderate tricuspid regurgitation. Estimated right ventricular systolic   pressure  is 60 mmHg.    Pulmonic Valve  The pulmonic valve is not well visualized. No stenosis or regurgitation   seen.    Pericardium  No pericardial effusion seen.    Aorta  Normal ascending aorta.     [facility] (2011):   FINDINGS:  1.  Sinus rhythm with ventricular pacing and PVCs.  2.  The left ventricular pressure is 106/7 end diastolic.  After left  ventricular cineangiogram, the EDP was 13.  Systemic pressure was low-  normal throughout procedure, typically 98/48.  There was no gradient  across the left ventricular outflow tract.  3.  Fluoroscopy demonstrates endocardial lead in the right ventricle.  Fluoroscopy demonstrates no intracardiac calcification.  4.  Selective coronary arteriograms.  A.  Left main:  This vessel appears normal.  B.  Left anterior  descending:  The LAD gives off one diagonal branch as  well as septal perforators and terminates basically at the apex of the  left ventricle.  Slight tortuosity in the midportion of the LAD.  No  flow limiting lesions.  No definitive plaquing.  C.  Ramus intermedius is a very small vessel and appears normal.  D.  Circumflex coronary artery is a nondominant vessel and consists of a  series of obtuse marginal vessels and a vestigial vessel in AV sulcus.  No lesions are noted in this nondominant system.  E.  Right coronary artery is a dominant vessel.  Gives off pulmonary  conus branch and posterior descending coronary artery and posterolateral  branch and AV node artery.  This dominant vessel has no lesions within  it.  5.  Left ventricular cineangiogram:  The left ventricle is enlarged and  globular in shape.  Severe diffuse hypokinesis is noted.  The ejection  fraction is measured at 25% and this agrees with the subjective  appearance of the contractility. The apex is well-visualized.  No  evidence of mural thrombus. No mitral regurgitation.     IMPRESSION  1.  Near-normal coronary arteriograms.  2.  Dilated globular left ventricle with diffuse hypokinesis and  ejection fraction of 25%.  3.  Normal left heart pressures at rest.        Radiology test Review:  CXR:   1.  Slightly improvement of diffuse interstitial edema pattern.    2.  Persistent cardiomegaly, mild interstitial edema, bibasilar atelectasis, and bilateral pleural effusions.      Head Ct:   1.  No evidence of acute intracranial process.    2.  Minimal atrophy.    3.  Old left basal ganglia lacunar infarct.     Impression and Medical Decision Making:  # Acute on chronic left sided systolic heart failure, EF 25%, non-ischemic  # Severe mitral regurgitation  # Moderate aortic regurgitation  # history of Medtronic CRT-D placement, original RV lead placed 1998  # h/o lung adenocarcinoma, [id number]  # Low grade NHL follicular lymphoma, 2012, followed by   Nina Reisdy, awaiting PET/CT and treatment  # Diabetes   # Paroxysmal atrial fibrillation     Recommendations:  # Continue diuresis, appears close to euvolemic, will decrease lasix to 20mg PO bid.  # switched coreg to metoprolol 12.5mg PO bid due to symptomatic hypotension and severe fatigue.  May need to stop altogether, but she remains quite tachycardic.  # DC entresto due to symptomatic hypotension.   # DC'd aspirin, cont rivaroxaban 20mg PO daily for atrial fibrillation  # hold spironolactone due to continued symptoms.  # will trial trazodone as sleep aid, meclizine for dizziness when lying down  # CXR given continued hypoxemia    I am concerned she is having worsening low output heart failure despite euvolemia and adequate diuresis.  This is an extremely poor prognostic sign. Will discuss more with family and patient tomorrow if symptoms don't improve with above management.      Discussed above recommendations with Dr. Keller.     Thank you for allowing me to participate in the care of this patient, I will continue to follow.  Please contact me with any questions.      Gilberto Dillon MD  Cardiologist, [facility] [place]   [phone]

## 2018-01-12 NOTE — DISCHARGE PLANNING
Medical Social Work    Pt currently on Xarelto RX. Will need printed script or RX E-prescribed to pt's pharmacy to verify no prior auth is needed and co pay amount.

## 2018-01-12 NOTE — PROGRESS NOTES
Patient complaining of mild sob while sleeping. Lungs do have crackles, sats 99% on 2L pt baseline. Lasix to be provided and inhaler.

## 2018-01-12 NOTE — CARE PLAN
Problem: Communication  Goal: The ability to communicate needs accurately and effectively will improve  Outcome: MET Date Met: 01/12/18

## 2018-01-12 NOTE — PROGRESS NOTES
Patient awake unable to sleep, requesting sleeping medication. Pt took ambien last night but did not tolerate well per pt to strong. Paged md for another alternative.

## 2018-01-12 NOTE — PROGRESS NOTES
Report received. Patient oriented x4. Pain level 0 out of 10. Denies SOB pt at edge of bed.  Fall risk interventions in place, bed in low position. Assessment completed.

## 2018-01-13 LAB
ANION GAP SERPL CALC-SCNC: 9 MMOL/L (ref 0–11.9)
BUN SERPL-MCNC: 44 MG/DL (ref 8–22)
CALCIUM SERPL-MCNC: 9.6 MG/DL (ref 8.5–10.5)
CHLORIDE SERPL-SCNC: 95 MMOL/L (ref 96–112)
CO2 SERPL-SCNC: 25 MMOL/L (ref 20–33)
CREAT SERPL-MCNC: 0.96 MG/DL (ref 0.5–1.4)
GLUCOSE BLD-MCNC: 131 MG/DL (ref 65–99)
GLUCOSE BLD-MCNC: 143 MG/DL (ref 65–99)
GLUCOSE BLD-MCNC: 144 MG/DL (ref 65–99)
GLUCOSE BLD-MCNC: 156 MG/DL (ref 65–99)
GLUCOSE BLD-MCNC: 177 MG/DL (ref 65–99)
GLUCOSE BLD-MCNC: 206 MG/DL (ref 65–99)
GLUCOSE SERPL-MCNC: 124 MG/DL (ref 65–99)
MAGNESIUM SERPL-MCNC: 1.9 MG/DL (ref 1.5–2.5)
POTASSIUM SERPL-SCNC: 4.7 MMOL/L (ref 3.6–5.5)
SODIUM SERPL-SCNC: 129 MMOL/L (ref 135–145)

## 2018-01-13 PROCEDURE — 700102 HCHG RX REV CODE 250 W/ 637 OVERRIDE(OP): Performed by: INTERNAL MEDICINE

## 2018-01-13 PROCEDURE — 36415 COLL VENOUS BLD VENIPUNCTURE: CPT

## 2018-01-13 PROCEDURE — 700111 HCHG RX REV CODE 636 W/ 250 OVERRIDE (IP): Performed by: INTERNAL MEDICINE

## 2018-01-13 PROCEDURE — A9270 NON-COVERED ITEM OR SERVICE: HCPCS | Performed by: INTERNAL MEDICINE

## 2018-01-13 PROCEDURE — 83735 ASSAY OF MAGNESIUM: CPT

## 2018-01-13 PROCEDURE — 700102 HCHG RX REV CODE 250 W/ 637 OVERRIDE(OP): Performed by: HOSPITALIST

## 2018-01-13 PROCEDURE — 770020 HCHG ROOM/CARE - TELE (206)

## 2018-01-13 PROCEDURE — 82962 GLUCOSE BLOOD TEST: CPT | Mod: 91

## 2018-01-13 PROCEDURE — 80048 BASIC METABOLIC PNL TOTAL CA: CPT

## 2018-01-13 PROCEDURE — 99233 SBSQ HOSP IP/OBS HIGH 50: CPT | Performed by: HOSPITALIST

## 2018-01-13 PROCEDURE — A9270 NON-COVERED ITEM OR SERVICE: HCPCS | Performed by: HOSPITALIST

## 2018-01-13 RX ORDER — FUROSEMIDE 10 MG/ML
20 INJECTION INTRAMUSCULAR; INTRAVENOUS EVERY 24 HOURS
Status: DISCONTINUED | OUTPATIENT
Start: 2018-01-13 | End: 2018-01-14

## 2018-01-13 RX ORDER — FUROSEMIDE 10 MG/ML
40 INJECTION INTRAMUSCULAR; INTRAVENOUS DAILY
Status: DISCONTINUED | OUTPATIENT
Start: 2018-01-13 | End: 2018-01-14

## 2018-01-13 RX ADMIN — METOPROLOL TARTRATE 12.5 MG: 25 TABLET, FILM COATED ORAL at 08:58

## 2018-01-13 RX ADMIN — FUROSEMIDE 20 MG: 20 TABLET ORAL at 05:25

## 2018-01-13 RX ADMIN — ACETAMINOPHEN 650 MG: 325 TABLET, FILM COATED ORAL at 00:44

## 2018-01-13 RX ADMIN — ACETAMINOPHEN 650 MG: 325 TABLET, FILM COATED ORAL at 20:19

## 2018-01-13 RX ADMIN — ALLOPURINOL 300 MG: 300 TABLET ORAL at 08:58

## 2018-01-13 RX ADMIN — GABAPENTIN 600 MG: 300 CAPSULE ORAL at 08:58

## 2018-01-13 RX ADMIN — Medication 500 MG: at 08:58

## 2018-01-13 RX ADMIN — METFORMIN HYDROCHLORIDE 500 MG: 500 TABLET, FILM COATED ORAL at 07:28

## 2018-01-13 RX ADMIN — ACETAMINOPHEN 650 MG: 325 TABLET, FILM COATED ORAL at 13:58

## 2018-01-13 RX ADMIN — FUROSEMIDE 40 MG: 10 INJECTION, SOLUTION INTRAMUSCULAR; INTRAVENOUS at 09:00

## 2018-01-13 RX ADMIN — ACETAMINOPHEN 650 MG: 325 TABLET, FILM COATED ORAL at 06:39

## 2018-01-13 RX ADMIN — GABAPENTIN 600 MG: 300 CAPSULE ORAL at 20:18

## 2018-01-13 RX ADMIN — METFORMIN HYDROCHLORIDE 500 MG: 500 TABLET, FILM COATED ORAL at 18:32

## 2018-01-13 RX ADMIN — RIVAROXABAN 15 MG: 15 TABLET, FILM COATED ORAL at 19:07

## 2018-01-13 RX ADMIN — FUROSEMIDE 20 MG: 10 INJECTION, SOLUTION INTRAMUSCULAR; INTRAVENOUS at 16:10

## 2018-01-13 RX ADMIN — METOPROLOL TARTRATE 12.5 MG: 25 TABLET, FILM COATED ORAL at 20:19

## 2018-01-13 RX ADMIN — MECLIZINE HYDROCHLORIDE 12.5 MG: 25 TABLET ORAL at 00:43

## 2018-01-13 RX ADMIN — GABAPENTIN 600 MG: 300 CAPSULE ORAL at 14:36

## 2018-01-13 RX ADMIN — TIOTROPIUM BROMIDE 1 CAPSULE: 18 CAPSULE ORAL; RESPIRATORY (INHALATION) at 08:52

## 2018-01-13 ASSESSMENT — PAIN SCALES - GENERAL
PAINLEVEL_OUTOF10: 8
PAINLEVEL_OUTOF10: 0
PAINLEVEL_OUTOF10: 6
PAINLEVEL_OUTOF10: 3
PAINLEVEL_OUTOF10: 3
PAINLEVEL_OUTOF10: 5
PAINLEVEL_OUTOF10: 6

## 2018-01-13 ASSESSMENT — ENCOUNTER SYMPTOMS
DIZZINESS: 0
WEAKNESS: 1
HEADACHES: 0
VOMITING: 0
CHILLS: 0
NECK PAIN: 0
FEVER: 0
NAUSEA: 0
PALPITATIONS: 0
DOUBLE VISION: 0
BLURRED VISION: 0
SPUTUM PRODUCTION: 0
COUGH: 0
ABDOMINAL PAIN: 0
MYALGIAS: 0
DEPRESSION: 0
SORE THROAT: 0
STRIDOR: 0
DIARRHEA: 0
SHORTNESS OF BREATH: 1

## 2018-01-13 ASSESSMENT — COGNITIVE AND FUNCTIONAL STATUS - GENERAL
MOBILITY SCORE: 24
DAILY ACTIVITIY SCORE: 24
SUGGESTED CMS G CODE MODIFIER MOBILITY: CH
SUGGESTED CMS G CODE MODIFIER DAILY ACTIVITY: CH

## 2018-01-13 ASSESSMENT — PATIENT HEALTH QUESTIONNAIRE - PHQ9
1. LITTLE INTEREST OR PLEASURE IN DOING THINGS: NOT AT ALL
SUM OF ALL RESPONSES TO PHQ9 QUESTIONS 1 AND 2: 0
SUM OF ALL RESPONSES TO PHQ QUESTIONS 1-9: 0
2. FEELING DOWN, DEPRESSED, IRRITABLE, OR HOPELESS: NOT AT ALL

## 2018-01-13 NOTE — PALLIATIVE CARE
Palliative Care follow-up  Paged returned from cardiology APRN regarding meeting for Sunday. Plan is to have family alert this PC RN when they arrive (suggested around 10AM) and this RN will page cardiology to alert them of family at BS and set meeting. Updates provided to Viridiana and BS RN Salud regarding plan for Sunday.    Updated: BS RN    Plan: Sunday meeting with cardiology; continue with plan    Thank you for allowing Palliative Care to participate in this patient's care. Please feel free to call [phone] with any questions or concerns.

## 2018-01-13 NOTE — PROGRESS NOTES
Pt v-paced on monitor with occasional single pvcs and runs of pvc vs aberrant a-fib. Pt denies chest pain. Pt tolerating metoprolol and lasix, with sbps in low 100s today.

## 2018-01-13 NOTE — PROGRESS NOTES
[facility] Hospitalist Progress Note    Date of Service: 1/13/2018    Chief Complaint  67 y.o. female admitted 1/7/2018 with fluid overload    Interval Problem Update  She was recently seen in clinic by Dr. Betancourt on 12/15/17, she is awaiting implantable PA monitor and enrollment in the [employer] HF trial.    >Here for CHF with reduced EF 25%,Has hx of AICD,Elevated BNP 2800 has been getting diuresed as BP tolerates , however continues to feel fatigued, lethargic. Insomnia. Is on 2L [place], states she usually wears 2L at night at home and sometimes puts it on at home when she needs to. BP are in 80-90s SBP, MAP >60    >Dr. Dillon is on board and has been adjusting her cardiac medications. CRT-D device interrogated and is working well.  It did show multiple episodes of atrial fibrillation for >30 hours back in September.  She was unaware of this event.  She is not on anticoagulation.  > patient started on xarelto in house    > this morning palliative care had a detailed discussed with patient and her family about GOC however , patient and her daughter have questions from cardiology stand point. Will have cardiology discuss in detail. She is sad about her prognosis , still feeling tired and fatigued.       Consultants/Specialty  cardiology    Disposition  home        Review of Systems   Constitutional: Positive for malaise/fatigue. Negative for chills and fever.   HENT: Positive for hearing loss. Negative for sore throat.    Eyes: Negative for blurred vision and double vision.   Respiratory: Positive for shortness of breath. Negative for cough, sputum production and stridor.    Cardiovascular: Negative for chest pain, palpitations and leg swelling.   Gastrointestinal: Negative for abdominal pain, diarrhea, nausea and vomiting.   Genitourinary: Negative for urgency.   Musculoskeletal: Negative for myalgias and neck pain.   Neurological: Positive for weakness. Negative for dizziness and headaches.   Psychiatric/Behavioral:  Negative for depression.      Physical Exam  Laboratory/Imaging   Hemodynamics  Temp (24hrs), Av.4 °C (97.5 °F), Min:36 °C (96.8 °F), Max:36.9 °C (98.4 °F)   Temperature: 36.1 °C (96.9 °F)  Pulse  Av.8  Min: 58  Max: 120    Blood Pressure : 106/41      Respiratory      Respiration: 18, Pulse Oximetry: 96 %     Work Of Breathing / Effort: Mild  RUL Breath Sounds: Diminished, RML Breath Sounds: Diminished, RLL Breath Sounds: Diminished, SHUBHAM Breath Sounds: Diminished, LLL Breath Sounds: Diminished    Fluids    Intake/Output Summary (Last 24 hours) at 18 1243  Last data filed at 18 2300   Gross per 24 hour   Intake              500 ml   Output                0 ml   Net              500 ml       Nutrition  Orders Placed This Encounter   Procedures   • Diet Order     Standing Status:   Standing     Number of Occurrences:   1     Order Specific Question:   Diet:     Answer:   2 Gram Sodium [7]     Order Specific Question:   Consistency/Fluid modifications:     Answer:   1500 ml Fluid Restriction [9]     Physical Exam   Constitutional: She is oriented to person, place, and time. No distress.   Tired, lethargic   HENT:   Head: Normocephalic and atraumatic.   Effortful speaking, slight slurred but fully understandable   Eyes: Left eye exhibits no discharge.   Neck: Neck supple. No tracheal deviation present. No thyromegaly present.   Cardiovascular: Normal rate.  Exam reveals no gallop and no friction rub.    Murmur heard.  Pulmonary/Chest: She is in respiratory distress. She has no wheezes. She has no rales (bibasilar).   On home 2L O2 requirement, usually wears 2L at night but at times in the day as well depending how she feels   Abdominal: She exhibits no distension. There is no tenderness. There is no rebound and no guarding.   Musculoskeletal: Normal range of motion. She exhibits edema (trace both legs). She exhibits no deformity.   No focal deficits, strength is 5/5 in all 4 extremities.    Neurological: She is alert and oriented to person, place, and time. No cranial nerve deficit. Coordination normal.   Skin: No rash noted. She is not diaphoretic. No erythema. No pallor.   Psychiatric: She has a normal mood and affect.   No facial droop, no pronator drift. Normal DTRs globally       Recent Labs      01/11/18   0832   WBC  10.4   RBC  4.96   HEMOGLOBIN  15.4   HEMATOCRIT  44.6   MCV  89.9   MCH  31.0   MCHC  34.5   RDW  50.8*   PLATELETCT  251   MPV  11.0     Recent Labs      01/11/18   0903  01/12/18   0353  01/13/18   0229   SODIUM  131*  132*  129*   POTASSIUM  4.9  4.4  4.7   CHLORIDE  96  95*  95*   CO2  25  27  25   GLUCOSE  169*  146*  124*   BUN  31*  40*  44*   CREATININE  1.03  1.18  0.96   CALCIUM  9.6  9.7  9.6     Recent Labs      01/11/18   0903   INR  2.54*     Recent Labs      01/12/18   0938   BNPBTYPENAT  4973*              Assessment/Plan     * Acute on chronic systolic heart failure (CMS-HCC)   Assessment & Plan    BNP 2800 however increased to 4973, CXR with edema and b/i pleural effusions  >; echo with ef 25%  Will diurese her aggressively today 40mg IV and then 20mg IV regardless of Bp.   Coreg 6.25mg--> metoprolol 12.5mg bid due to symptomatic hypotension and fatigue     This is concerning as she is having worsening low output heart failure despite euvolemia and adequate diuresis. Patient remains in guarded condition. Poor prognostic sign.  > palliative discussed in detail however patient and family have questions for the cardiologist    If no clinical improvement by tomorrow will consider thoracentesis          A-fib (CMS-HCC)   Assessment & Plan    Start xarelto  Will involve SW         Nonsustained ventricular tachycardia (CMS-HCC)   Assessment & Plan    Picked up by tele, patient asymptomatic  Cardiology on board          Acute on chronic respiratory failure (CMS-HCC)   Assessment & Plan    Likely due to CHF with elevated BNP, continue lasix  Discussed with Dr. Dillon         DM type 2 (diabetes mellitus, type 2) (CMS-[facility])- (present on admission)   Assessment & Plan    Diabetic diet    fs with ssi    Cont metformin          A.fib, on xarelto  BNP worse, CXR worse, lasix IV today BIG  Switch coreg--> metoprolol 12.5mg bid  Palliative on board    I spent a total of 40 minutes of time during this clinical encounter of which > 50% was devoted to counseling and coordinating care including review of records, pertinent lab data and studies, as well as discussing diagnostic evaluation and work up, planned therapeutic interventions and future disposition of care. Where indicated, the assessment and plan reflect discussion of patient with consultants, other healthcare providers, family members, and additional research needed to obtain further information in formulating the plan of care of this patient. This time includes no procedures or overlap with other providers.        Reece catheter::  No Reece  DVT: xarelto.

## 2018-01-13 NOTE — CARE PLAN
Problem: Safety  Goal: Will remain free from injury  Outcome: PROGRESSING AS EXPECTED  PT ambulates independently. PT encouraged to get up slowly because of her medication. PT encouraged to call for help when needed.    Problem: Venous Thromboembolism (VTW)/Deep Vein Thrombosis (DVT) Prevention:  Goal: Patient will participate in Venous Thrombosis (VTE)/Deep Vein Thrombosis (DVT)Prevention Measures  Outcome: PROGRESSING AS EXPECTED

## 2018-01-13 NOTE — CONSULTS
"Reason for PC Consult: Advance Care Planning    Consulted by: Arianna Wilson, Hosp    Assessment:  General: 68 y/o female with CHF diagnosed in 1998 at [facility] with plan for transplant at some point but patient was \"bumped down the list because of her age\" per daughter. Current EF 25% (has fluctuated from 15%-35% over past year). PMHx of HTN, high cholesterol, neuropathy, depression, LBBB, MI, GERD, lymphoma, lung CA, DM. S/P ICD placement (2011 most recent); concerns with low UOP    Consults: cardiology    Dyspnea: No- on 2L NC; no notable SOB  Last BM: 01/12/18-    Pain: No-    Depression: Unable to determine-    Dementia: No;       Spiritual:  Is Yazidi or spirituality important for coping with this illness? No-    Has a  or spiritual provider visit been requested? No    Palliative Performance Scale: 40%    Advance Directive: None- packet left and family expressed wanting to complete  DPOA: No-  NOK is  Viridiana  POLST: No-  Also explained    Code Status: Full- discussed at length    Outcome:  PC RN met with the patient (who slept most of the encounter), her daughters Kera and Anastasia, as well as  Viridiana at bedside and explained the role of PC. They expressed understanding of her medical condition noting dx back to 1998, ICD placement, and being placed on the list for transplant long ago. The patient was awake for introductions but noted not sleeping much and promptly dozed off. The family expressed not understanding her current situation or discussions that have taken place because they \"miss the doctor and she isn't remembering everything they tell her.\" PC RN expressed understanding of the difficulty of knowing the most appropriate time to be present in order to get questions answered and assist with the plan, which Isatu is needing. They expressed there being mention of a \"procedure\" and Kera mentioned her research into LVAD devices and whether her mom would be a candidate. PC RN " "validated her questions and concerns, noting that cardiology is the expert on these cases and are best equipped to answer the questions related to procedures, treatment and prognosis. PC RN offered to help arrange this meeting.    PC RN discussed the AD and POLST, including their role in the patient's plan if she's ever unable to express her wishes. Viridiana noted that they had a meeting arranged to do their ADs but had to cancel because she was here. PC RN offered to help complete when Isatu is more awake/able. Code status was also discussed and the daughters became tearful. PC RN expressed understanding of the sadness associated with thinking about their mother possibly passing, but expressed the importance of knowing her wishes related to those measures. Anastasia expressed \"we don't want her to suffer\" which this RN acknowledged and validated. They understand code status relates to the \"big picture\" scenarios and does not impact her treatment otherwise. At this time, the family has questions that need to be addressed by the cardiology team prior to developing more of a POC.     While talking to the [place] family, PC RN provided active listening, normalization, validation of thoughts and feelings, as well as reinforced Isatu's goals of care. PC contact information provided to them and encouraged to call with any questions or concerns.     Updated: MD/RN    Plan: arrange meeting with cardiology; assist with AD/POLST and POC    Recommendations: I do not recommend an ethics or hospice consult at this time because more information is necessary.    Thank you for allowing Palliative Care to participate in this patient's care. Please feel free to call [phone] with any questions or concerns.  "

## 2018-01-13 NOTE — PROGRESS NOTES
Assumed care of Pt. PT a/o X4 , sitting in the chair. PT ambulates independently. Personal items and call bell in reach. White board updated.

## 2018-01-13 NOTE — PROGRESS NOTES
Cardiology Follow-up Consult Note    Date of note:    1/13/2018      Consulting Physician: Celena Keller M.D.    Patient ID:    Name:   Isatu Dang   YOB: 1950  Age:   67 y.o.  female   MRN:   [id number]      Reason for Consultation: CHF    HPI/Patient ID:    Isatu Dang is a 67 y.o.-year-old female with a history of Stage C left sided systolic heart failure (EF 25%), Medtronic CRT-D, severe functional mitral regurgitation,  hypertension, hypercholesterolemia who presents with SOB     She was recently seen in clinic by Dr. Betancourt on 12/15/17, she is awaiting implantable PA monitor and enrollment in the [employer] HF trial.  After that visit, she left for [place].  She presented on 1/7/2018 with worsening SOB, orthopnea, PND, and LE swelling for 1 week in the setting of possible sodium dietary indescretion. BNP was elevated at 2800.       Monitor review has shown 100% paced beats, multiple PVCs and ectopic beats, and short runs of NSVT (asymptomatic) as well as SVT. She has also been hypotensive, cardiology was consulted for assistance in management.      Currently she is asymptomatic except for abdominal pain.       CRT-D device interrogated and is working well.  It did show multiple episodes of atrial fibrillation for >30 hours back in September.  She was unaware of this event.  She is not on anticoagulation.  She denies history of diabetes, CVA, or PVD.     Interim Events:  # tele reviewed, occasional NSVT, PVCs  # no change in weight, positive 700ccs charted.   # continued extreme lethargy, lightheadedness and shortness of breath. She has severe insomnia.   # Patient denies chest pain, palpitations, melena, BRBPR, hematemesis, cough, new numbness, tingling, or focal weakness.   # interested in possible LVAD, will discuss tomorrow.       Medications: Reviewed in MAR      Allergies   Allergen Reactions   • Tape Contact Dermatitis   • Ace Inhibitors Cough     cough           Physical Exam  Body  "mass index is 28.83 kg/m².  Blood pressure (!) 91/54, pulse (!) 109, temperature 36.1 °C (96.9 °F), resp. rate 20, height 1.575 m (5' 2\"), weight 71.5 kg (157 lb 10.1 oz), last menstrual period 11/05/1994, SpO2 97 %.  Vitals:    01/13/18 0055 01/13/18 0400 01/13/18 0528 01/13/18 0731   BP: 100/58 (!) 95/63 100/60 (!) 91/54   Pulse: 98 (!) 104 (!) 103 (!) 109   Resp: 16 16  20   Temp: 36.7 °C (98 °F) 36.5 °C (97.7 °F)  36.1 °C (96.9 °F)   SpO2: 95% 94%  97%   Weight:       Height:         Oxygen Therapy:  Pulse Oximetry: 97 %, O2 (LPM): 2, O2 Delivery: Silicone Nasal Cannula    General: Anxious, looks ill.   Eyes: nl conjunctiva  ENT: OP clear  Neck: + JVD, no carotid bruits  Lungs: increased, bibasilar crackles  Heart: RRR, 2/6 systolic murmur at apex, no rubs or gallops, trace edema bilateral lower extremities. No LV/RV heave on cardiac palpatation. 2+ bilateral radial pulses.  2+ bilateral dp pulses.   Abdomen: soft, non tender, non distended, no masses, normal bowel sounds.  No HSM.  Extremities/MSK: no clubbing, no cyanosis  Neurological: No focal sensory deficits  Psychiatric: Appropriate affect, A/O x 3  Skin: Warm extremities    Labs (personally reviewed and notable for):   Lab Results   Component Value Date/Time    SODIUM 129 (L) 01/13/2018 02:29 AM    POTASSIUM 4.7 01/13/2018 02:29 AM    CHLORIDE 95 (L) 01/13/2018 02:29 AM    CO2 25 01/13/2018 02:29 AM    GLUCOSE 124 (H) 01/13/2018 02:29 AM    BUN 44 (H) 01/13/2018 02:29 AM    CREATININE 0.96 01/13/2018 02:29 AM    CREATININE 0.7 03/25/2008 10:20 AM      Lab Results   Component Value Date/Time    WBC 10.4 01/11/2018 08:32 AM    RBC 4.96 01/11/2018 08:32 AM    HEMOGLOBIN 15.4 01/11/2018 08:32 AM    HEMATOCRIT 44.6 01/11/2018 08:32 AM    MCV 89.9 01/11/2018 08:32 AM    MCH 31.0 01/11/2018 08:32 AM    MCHC 34.5 01/11/2018 08:32 AM    MPV 11.0 01/11/2018 08:32 AM    NEUTSPOLYS 66.20 01/11/2018 08:32 AM    LYMPHOCYTES 18.10 (L) 01/11/2018 08:32 AM    MONOCYTES " 6.90 01/11/2018 08:32 AM    EOSINOPHILS 6.60 01/11/2018 08:32 AM    BASOPHILS 1.30 01/11/2018 08:32 AM        BNP 1/7/2018 - 2800  Trop 0.05  Creatinine 0.9      Cardiac Imaging and Procedures Review:    EKG dated 1/7/2018: My personal interpretation is A sensed, v paced at 122     Echo dated 12/18/2017:  Left Ventricle  Left ventricle is moderately dilated. Normal left ventricular wall   thickness. Severely reduced left ventricular systolic function. Left   ventricular ejection fraction is visually estimated to be 25%.   Hypokinesis of the inferior and lateral walls. Contractility is best   preserved in the anterior wall and the septum.    Right Ventricle  Normal right ventricular size and systolic function.    Right Atrium  Normal right atrial size. Dilated inferior vena cava with inspiratory   collapse.    Left Atrium  Severely dilated left atrium. Left atrial volume index is 54 mL/sq m.    Mitral Valve  Severe, functional mitral regurgitation. ERO by PISA method is 0.4 sq   cm. The PISA radius is 0.8 cm.    Aortic Valve  Aortic sclerosis without stenosis. Moderate (borderline severe) aortic   insufficiency. Vena contracta is 0.6 cm.    Tricuspid Valve  Moderate tricuspid regurgitation. Estimated right ventricular systolic   pressure  is 60 mmHg.    Pulmonic Valve  The pulmonic valve is not well visualized. No stenosis or regurgitation   seen.    Pericardium  No pericardial effusion seen.    Aorta  Normal ascending aorta.     [facility] (2011):   FINDINGS:  1.  Sinus rhythm with ventricular pacing and PVCs.  2.  The left ventricular pressure is 106/7 end diastolic.  After left  ventricular cineangiogram, the EDP was 13.  Systemic pressure was low-  normal throughout procedure, typically 98/48.  There was no gradient  across the left ventricular outflow tract.  3.  Fluoroscopy demonstrates endocardial lead in the right ventricle.  Fluoroscopy demonstrates no intracardiac calcification.  4.  Selective coronary  arteriograms.  A.  Left main:  This vessel appears normal.  B.  Left anterior descending:  The LAD gives off one diagonal branch as  well as septal perforators and terminates basically at the apex of the  left ventricle.  Slight tortuosity in the midportion of the LAD.  No  flow limiting lesions.  No definitive plaquing.  C.  Ramus intermedius is a very small vessel and appears normal.  D.  Circumflex coronary artery is a nondominant vessel and consists of a  series of obtuse marginal vessels and a vestigial vessel in AV sulcus.  No lesions are noted in this nondominant system.  E.  Right coronary artery is a dominant vessel.  Gives off pulmonary  conus branch and posterior descending coronary artery and posterolateral  branch and AV node artery.  This dominant vessel has no lesions within  it.  5.  Left ventricular cineangiogram:  The left ventricle is enlarged and  globular in shape.  Severe diffuse hypokinesis is noted.  The ejection  fraction is measured at 25% and this agrees with the subjective  appearance of the contractility. The apex is well-visualized.  No  evidence of mural thrombus. No mitral regurgitation.     IMPRESSION  1.  Near-normal coronary arteriograms.  2.  Dilated globular left ventricle with diffuse hypokinesis and  ejection fraction of 25%.  3.  Normal left heart pressures at rest.        Radiology test Review:  CXR 1/12/2018:   Pulmonary edema with bibasilar atelectasis or dependent edema with associated bilateral pleural effusions.     Head Ct:   1.  No evidence of acute intracranial process.    2.  Minimal atrophy.    3.  Old left basal ganglia lacunar infarct.     Impression and Medical Decision Making:  # Acute on chronic left sided systolic heart failure, EF 25%, non-ischemic  # Severe mitral regurgitation  # Moderate aortic regurgitation  # history of Medtronic CRT-D placement, original RV lead placed 1998  # h/o lung adenocarcinoma, [id number]  # Low grade NHL follicular lymphoma, 2012,  followed by Dr. Nina Rodríguez, awaiting PET/CT and treatment  # Diabetes   # Paroxysmal atrial fibrillation     Recommendations:  # Continue diuresis, increase lasix to 40mg IV now, then 20mg IV bid  # switched coreg to metoprolol 12.5mg PO bid due to symptomatic hypotension and severe fatigue.  May need to stop altogether, but she remains quite tachycardic.  # DC'd entresto due to symptomatic hypotension.   # DC'd aspirin, cont rivaroxaban 20mg PO daily for atrial fibrillation  # hold spironolactone due to continued symptoms.  # will trial trazodone as sleep aid, meclizine for dizziness when lying down  # will consider thoracentesis if fluid status does not improve by tomorrow.      Discussed above recommendations with Dr. Keller.     Thank you for allowing me to participate in the care of this patient, I will continue to follow.  Please contact me with any questions.      Gilberto Dillon MD  Cardiologist, [facility] [place]   [phone]

## 2018-01-13 NOTE — PROGRESS NOTES
Aurelia Ellis Fall Risk Assessment:     Last Known Fall: No falls  Mobility: No limitations  Medications: Cardiovascular and central nervous system meds, Diuretics  Mental Status/LOC/Awareness: Awake, alert, and oriented to date, place, and person  Toileting Needs: No needs  Volume/Electrolyte Status: No problems  Communication/Sensory: No deficits  Behavior: Appropriate behavior  Aurelia Ellis Fall Risk Total: 8  Fall Risk Level: LOW RISK    Universal Fall Precautions:  call light/belongings in reach, bed in low position and locked, siderails up x 2, use non-slip footwear, adequate lighting, clutter free and spill free environment, educate on level of risk, educate to call for assistance    Fall Risk Level Interventions:   TRIAL (TELE 8, NEURO, MED BRUCE 5) Low Fall Risk Interventions  Place yellow fall risk ID band on patient: verified  Provide patient/family education based on risk assessment: completed  Educate patient/family to call staff for assistance when getting out of bed: completed  Place fall precaution signage outside patient door: verified      Patient Specific Interventions:     Medication: review medications with patient and family  Mental Status/LOC/Awareness: reinforce falls education  Toileting: instruct patient/family on the use of grab bars  Volume/Electrolyte Status: monitor blood sugars and maintain appropriate blood sugar levels if diabetic  Communication/Sensory: update plan of care on whiteboard  Behavioral: encourage patient to voice feelings  Mobility: ensure bed is locked and in lowest position

## 2018-01-14 ENCOUNTER — APPOINTMENT (OUTPATIENT)
Dept: RADIOLOGY | Facility: MEDICAL CENTER | Age: 68
DRG: 291 | End: 2018-01-14
Attending: HOSPITALIST
Payer: MEDICARE

## 2018-01-14 LAB
ANION GAP SERPL CALC-SCNC: 12 MMOL/L (ref 0–11.9)
BUN SERPL-MCNC: 58 MG/DL (ref 8–22)
CALCIUM SERPL-MCNC: 9.8 MG/DL (ref 8.5–10.5)
CFT BLD TEG: 8 MIN (ref 5–10)
CFT P HPASE BLD TEG: 7.4 MIN (ref 5–10)
CHLORIDE SERPL-SCNC: 92 MMOL/L (ref 96–112)
CLOT ANGLE BLD TEG: 65.2 DEGREES (ref 53–72)
CLOT ANGLE P HPASE BLD TEG: 69.6 DEGREES (ref 53–72)
CLOT INIT P HPASE BLD TEG: 1.5 MIN (ref 1–3)
CLOT LYSIS 30M P MA LENFR BLD TEG: 0.3 % (ref 0–8)
CLOT LYSIS 30M P MA LENFR BLD TEG: 0.8 % (ref 0–8)
CO2 SERPL-SCNC: 22 MMOL/L (ref 20–33)
CREAT SERPL-MCNC: 1.34 MG/DL (ref 0.5–1.4)
CT.EXTRINSIC BLD ROTEM: 1.8 MIN (ref 1–3)
GLUCOSE BLD-MCNC: 131 MG/DL (ref 65–99)
GLUCOSE BLD-MCNC: 148 MG/DL (ref 65–99)
GLUCOSE BLD-MCNC: 148 MG/DL (ref 65–99)
GLUCOSE BLD-MCNC: 160 MG/DL (ref 65–99)
GLUCOSE SERPL-MCNC: 158 MG/DL (ref 65–99)
INR PPP: 2.3 (ref 0.87–1.13)
INR PPP: 3.39 (ref 0.87–1.13)
LV EJECT FRACT  99904: 15
LV EJECT FRACT MOD 2C 99903: 6.93
LV EJECT FRACT MOD 4C 99902: 9.36
LV EJECT FRACT MOD BP 99901: 10.65
MAGNESIUM SERPL-MCNC: 1.7 MG/DL (ref 1.5–2.5)
MCF BLD TEG: 65.8 MM (ref 50–70)
MCF P HPASE BLD TEG: 69.4 MM (ref 50–70)
PLATELET # BLD AUTO: 219 K/UL (ref 164–446)
POTASSIUM SERPL-SCNC: 4.7 MMOL/L (ref 3.6–5.5)
PROTHROMBIN TIME: 25 SEC (ref 12–14.6)
PROTHROMBIN TIME: 34 SEC (ref 12–14.6)
SODIUM SERPL-SCNC: 126 MMOL/L (ref 135–145)
TEG ALGORITHM TGALG: NORMAL

## 2018-01-14 PROCEDURE — 93306 TTE W/DOPPLER COMPLETE: CPT | Mod: 26 | Performed by: INTERNAL MEDICINE

## 2018-01-14 PROCEDURE — 85610 PROTHROMBIN TIME: CPT | Mod: 91

## 2018-01-14 PROCEDURE — 93306 TTE W/DOPPLER COMPLETE: CPT

## 2018-01-14 PROCEDURE — 82962 GLUCOSE BLOOD TEST: CPT | Mod: 91

## 2018-01-14 PROCEDURE — 99233 SBSQ HOSP IP/OBS HIGH 50: CPT | Performed by: HOSPITALIST

## 2018-01-14 PROCEDURE — 700111 HCHG RX REV CODE 636 W/ 250 OVERRIDE (IP): Performed by: INTERNAL MEDICINE

## 2018-01-14 PROCEDURE — 83735 ASSAY OF MAGNESIUM: CPT

## 2018-01-14 PROCEDURE — 770020 HCHG ROOM/CARE - TELE (206)

## 2018-01-14 PROCEDURE — A9270 NON-COVERED ITEM OR SERVICE: HCPCS | Performed by: INTERNAL MEDICINE

## 2018-01-14 PROCEDURE — 36415 COLL VENOUS BLD VENIPUNCTURE: CPT

## 2018-01-14 PROCEDURE — 700102 HCHG RX REV CODE 250 W/ 637 OVERRIDE(OP): Performed by: INTERNAL MEDICINE

## 2018-01-14 PROCEDURE — 80048 BASIC METABOLIC PNL TOTAL CA: CPT

## 2018-01-14 PROCEDURE — 85347 COAGULATION TIME ACTIVATED: CPT | Mod: 91

## 2018-01-14 PROCEDURE — 85384 FIBRINOGEN ACTIVITY: CPT | Mod: 91

## 2018-01-14 PROCEDURE — 85576 BLOOD PLATELET AGGREGATION: CPT | Mod: 91

## 2018-01-14 PROCEDURE — 85049 AUTOMATED PLATELET COUNT: CPT

## 2018-01-14 RX ORDER — FUROSEMIDE 10 MG/ML
20 INJECTION INTRAMUSCULAR; INTRAVENOUS DAILY
Status: DISCONTINUED | OUTPATIENT
Start: 2018-01-15 | End: 2018-01-15

## 2018-01-14 RX ADMIN — METOPROLOL TARTRATE 12.5 MG: 25 TABLET, FILM COATED ORAL at 20:48

## 2018-01-14 RX ADMIN — ACETAMINOPHEN 650 MG: 325 TABLET, FILM COATED ORAL at 08:19

## 2018-01-14 RX ADMIN — METFORMIN HYDROCHLORIDE 500 MG: 500 TABLET, FILM COATED ORAL at 17:39

## 2018-01-14 RX ADMIN — FUROSEMIDE 20 MG: 10 INJECTION, SOLUTION INTRAMUSCULAR; INTRAVENOUS at 15:08

## 2018-01-14 RX ADMIN — METFORMIN HYDROCHLORIDE 500 MG: 500 TABLET, FILM COATED ORAL at 08:19

## 2018-01-14 RX ADMIN — Medication 500 MG: at 08:19

## 2018-01-14 RX ADMIN — FUROSEMIDE 40 MG: 10 INJECTION, SOLUTION INTRAMUSCULAR; INTRAVENOUS at 08:20

## 2018-01-14 RX ADMIN — METOPROLOL TARTRATE 12.5 MG: 25 TABLET, FILM COATED ORAL at 08:20

## 2018-01-14 RX ADMIN — ALBUTEROL SULFATE 2 PUFF: 90 AEROSOL, METERED RESPIRATORY (INHALATION) at 03:11

## 2018-01-14 RX ADMIN — ACETAMINOPHEN 650 MG: 325 TABLET, FILM COATED ORAL at 20:47

## 2018-01-14 RX ADMIN — GABAPENTIN 600 MG: 300 CAPSULE ORAL at 14:27

## 2018-01-14 RX ADMIN — GABAPENTIN 600 MG: 300 CAPSULE ORAL at 08:20

## 2018-01-14 RX ADMIN — INSULIN HUMAN 2 UNITS: 100 INJECTION, SOLUTION PARENTERAL at 11:30

## 2018-01-14 RX ADMIN — GABAPENTIN 600 MG: 300 CAPSULE ORAL at 20:47

## 2018-01-14 RX ADMIN — ACETAMINOPHEN 650 MG: 325 TABLET, FILM COATED ORAL at 14:27

## 2018-01-14 RX ADMIN — TIOTROPIUM BROMIDE 1 CAPSULE: 18 CAPSULE ORAL; RESPIRATORY (INHALATION) at 11:31

## 2018-01-14 RX ADMIN — ALLOPURINOL 300 MG: 300 TABLET ORAL at 08:19

## 2018-01-14 RX ADMIN — ONDANSETRON 4 MG: 2 INJECTION INTRAMUSCULAR; INTRAVENOUS at 15:06

## 2018-01-14 ASSESSMENT — PAIN SCALES - GENERAL
PAINLEVEL_OUTOF10: 5
PAINLEVEL_OUTOF10: 3
PAINLEVEL_OUTOF10: 3
PAINLEVEL_OUTOF10: 6
PAINLEVEL_OUTOF10: 3
PAINLEVEL_OUTOF10: 3
PAINLEVEL_OUTOF10: 5

## 2018-01-14 ASSESSMENT — ENCOUNTER SYMPTOMS
PALPITATIONS: 0
NECK PAIN: 0
SORE THROAT: 0
DIARRHEA: 0
WEAKNESS: 1
DIZZINESS: 0
CHILLS: 0
ABDOMINAL PAIN: 0
DEPRESSION: 0
MYALGIAS: 0
HEADACHES: 0
COUGH: 1
DOUBLE VISION: 0
VOMITING: 0
BLURRED VISION: 0
NAUSEA: 0
STRIDOR: 0
SHORTNESS OF BREATH: 1
FEVER: 0

## 2018-01-14 ASSESSMENT — PATIENT HEALTH QUESTIONNAIRE - PHQ9
SUM OF ALL RESPONSES TO PHQ9 QUESTIONS 1 AND 2: 0
SUM OF ALL RESPONSES TO PHQ QUESTIONS 1-9: 0
1. LITTLE INTEREST OR PLEASURE IN DOING THINGS: NOT AT ALL

## 2018-01-14 NOTE — CARE PLAN
Problem: Safety  Goal: Will remain free from injury    Intervention: Provide assistance with mobility  Patient up to bathroom, steady on feet. Treaded slipper socks on. Bed in low/locked position. Call light within patient's reach. Hourly rounding in place.       Problem: Venous Thromboembolism (VTW)/Deep Vein Thrombosis (DVT) Prevention:  Goal: Patient will participate in Venous Thrombosis (VTE)/Deep Vein Thrombosis (DVT)Prevention Measures    Intervention: Assess and monitor for anticoagulation complications  Patient on xarelto.      Problem: Pain Management  Goal: Pain level will decrease to patient's comfort goal    Intervention: Follow pain managment plan developed in collaboration with patient and Interdisciplinary Team  Patient complained of generalized pain in shoulders and back. Medicated with tylenol. Will reassess.

## 2018-01-14 NOTE — PROGRESS NOTES
Patient to have thoracentesis. Patient and family aware of plan. STAT platelets and INR drawn. Consent printed and on top of chart.

## 2018-01-14 NOTE — CARE PLAN
Problem: Knowledge Deficit  Goal: Knowledge of disease process/condition, treatment plan, diagnostic tests, and medications will improve  Outcome: PROGRESSING AS EXPECTED  Patient understands POC, anticipating cardiology meeting tomorrow to decide on future care.    Problem: Respiratory:  Goal: Respiratory status will improve  Outcome: PROGRESSING SLOWER THAN EXPECTED  PT still C/O SOB, sleeps in the chair.    Problem: Respiratory:  Goal: Respiratory status will improve  Outcome: PROGRESSING SLOWER THAN EXPECTED  PT still C/O SOB, sleeps in the chair.

## 2018-01-14 NOTE — PROGRESS NOTES
Call to Shailesh, pharmacist, to clarify dose of xarelto based on pt's creatine clearance. Per Shailesh, ok to given 20 of Xarelto this evening.

## 2018-01-14 NOTE — ASSESSMENT & PLAN NOTE
I did have a chance to discuss with patient,30 min face to face about GOC and seriousness of her Heart failure. She does want to talk to Dr. Dillon about her prognosis from cardiac stand point and options in further detail however She  mention she wants to be DNR with her current condition not improving  > palliative care and Dr. Dillon also discussed patients condition from cardiac standpoint with patient and her family.

## 2018-01-14 NOTE — PALLIATIVE CARE
"Palliative Care follow-up  Call received from family that they are at BS and PC RN paged cardiology to arrange meeting time. Appreciate Dr. Dillon's prompt response and involvement in the family meeting. PC RN met with MD at bedside along with the patient up in the chair, her , 2 daughters and son in law. MD provided updates on Isatu's current situation involving her CHF, need for repeat ECHO, possible thoracentesis, concerns with diuresis and kidney function, and plan moving forward. Family and MD discussed various procedures including the LVAD, mitral clipping, MEMS procedure, transplant, as well as candidacy/extent of these procedures. Ultimately, they patient and family decided that having [facility] evaluate her to determine if they can help her condition was desired. MD offered to reach out to the team at [facility] and initiate that evaluation. They denied any other questions for the MD at that time. PC RN discussed POC further, noting that when more information has been received from [facility], the plan could then be further established. If [facility] felt she was a candidate for interventions, the patient would likely be transferred from [facility] to their institution. PC RN discussed code status again given the patient had been resting much of the conversation yesterday. The patient had an understanding of full code vs DNR, but this RN provided an explanation to ensure everyone had the same understanding. Isatu strongly felt that at this time, she wanted to be a DNR, recalling \"I went through this with my mom.\" The family struggled with her decision and were tearful, but ultimately supported her. PC RN reminded them that this changes nothing else in her POC and that if she improves down the road, this can always be changed. They expressed understanding. Isatu wished to complete the POLST reflecting these wishes and this was achieved. No further questions remained at the end of the visit but " encouraged they call with any needs or concerns.     POLST reviewed and signed by Dr. Dillon.    Updated: SANTIAGO RN/MD    Plan: code status change; POLST scan into EMR- original on chart for time of DC    Thank you for allowing Palliative Care to participate in this patient's care. Please feel free to call [phone] with any questions or concerns.

## 2018-01-14 NOTE — PROGRESS NOTES
Updated  of results of INR and platelet count.  Also notified Dr. Keller of patient's respiration rate of 24/minute while patient in bed.

## 2018-01-14 NOTE — PROGRESS NOTES
Aurelia Ellis Fall Risk Assessment:     Last Known Fall: No falls  Mobility: Dizziness/generalized weakness  Medications: Diuretics, Cardiovascular or central nervous system meds  Mental Status/LOC/Awareness: Awake, alert, and oriented to date, place, and person  Toileting Needs: Use of assistive device (Bedside commode, bedpan, urinal)  Volume/Electrolyte Status: No problems  Communication/Sensory: No deficits  Behavior: Appropriate behavior  Aurelia Ellis Fall Risk Total: 10  Fall Risk Level: LOW RISK    Universal Fall Precautions:  call light/belongings in reach, bed in low position and locked, siderails up x 2, use non-slip footwear, adequate lighting, clutter free and spill free environment, educate on level of risk, educate to call for assistance    Fall Risk Level Interventions:   TRIAL ([place]-8, NEURO, MED BRUCE 5) Low Fall Risk Interventions  Place yellow fall risk ID band on patient: verified  Provide patient/family education based on risk assessment: completed  Educate patient/family to call staff for assistance when getting out of bed: completed  Place fall precaution signage outside patient door: verifiedTRIAL ([place]-8, NEURO, MED BRUCE 5) Moderate Fall Risk Interventions  Place yellow fall risk ID band on patient: verified  Provide patient/family education based on risk assessment : completed  Educate patient/family to call staff for assistance when getting out of bed: completed  Place fall precaution signage outside patient door: verified     Patient Specific Interventions:     Medication: review medications with patient and family  Mental Status/LOC/Awareness: reinforce falls education  Toileting: provide frquent toileting  Volume/Electrolyte Status: monitor blood sugars and maintain appropriate blood sugar levels if diabetic  Communication/Sensory: update plan of care on whiteboard  Behavioral: encourage patient to voice feelings  Mobility: ensure bed is locked and in lowest position

## 2018-01-14 NOTE — PROGRESS NOTES
Pt v paced on monitor with occasional single, couplet and runs of pvcs. Pt c/o of general weakness, denies pain.

## 2018-01-14 NOTE — PROGRESS NOTES
[facility] Hospitalist Progress Note    Date of Service: 2018    Chief Complaint  67 y.o. female admitted 2018 with fluid overload    Interval Problem Update  She was recently seen in clinic by Dr. Betancourt on 12/15/17, she is awaiting implantable PA monitor and enrollment in the [employer] HF trial.    >Here for CHF with reduced EF 25%,Has hx of AICD,Elevated BNP > 4000 has been getting diuresed as BP tolerates , however continues to feel fatigued, lethargic. She remains fluid overloaded despite Lasix  >Dr. Dillon is on board and has been adjusting her cardiac medications. CRT-D device interrogated and is working well.  It did show multiple episodes of atrial fibrillation for >30 hours back in September.  She was unaware of this event.  She is not on anticoagulation. Dr. Dillon will try to reach CPMS for further cardiac dilation and options patient has poor prognosis with current heart failure  > patient started on xarelto in house    > Palliative on board- patient now DNR    Consultants/Specialty  cardiology    Disposition  home        Review of Systems   Constitutional: Positive for malaise/fatigue. Negative for chills and fever.   HENT: Positive for hearing loss. Negative for sore throat.    Eyes: Negative for blurred vision and double vision.   Respiratory: Positive for cough and shortness of breath. Negative for stridor.    Cardiovascular: Negative for chest pain, palpitations and leg swelling.   Gastrointestinal: Negative for abdominal pain, diarrhea, nausea and vomiting.   Genitourinary: Negative for urgency.   Musculoskeletal: Negative for myalgias and neck pain.   Neurological: Positive for weakness. Negative for dizziness and headaches.   Psychiatric/Behavioral: Negative for depression.      Physical Exam  Laboratory/Imaging   Hemodynamics  Temp (24hrs), Av.2 °C (97.1 °F), Min:35.8 °C (96.5 °F), Max:36.5 °C (97.7 °F)   Temperature: 35.8 °C (96.5 °F)  Pulse  Av.8  Min: 58  Max: 120    Blood  Pressure : (!) 92/68 (RN notified)      Respiratory      Respiration: 20, Pulse Oximetry: 97 %     Work Of Breathing / Effort: Moderate  RUL Breath Sounds: Diminished, RML Breath Sounds: Diminished, RLL Breath Sounds: Diminished, SHUBHAM Breath Sounds: Diminished, LLL Breath Sounds: Diminished    Fluids    Intake/Output Summary (Last 24 hours) at 01/14/18 1233  Last data filed at 01/14/18 0900   Gross per 24 hour   Intake              600 ml   Output              700 ml   Net             -100 ml       Nutrition  Orders Placed This Encounter   Procedures   • Diet Order     Standing Status:   Standing     Number of Occurrences:   1     Order Specific Question:   Diet:     Answer:   2 Gram Sodium [7]     Order Specific Question:   Consistency/Fluid modifications:     Answer:   1500 ml Fluid Restriction [9]     Physical Exam   Constitutional: She is oriented to person, place, and time. She appears well-developed. No distress (mild).   Tired, lethargic   HENT:   Head: Normocephalic and atraumatic.   Effortful speaking, slight slurred but fully understandable   Eyes: Left eye exhibits no discharge.   Neck: Neck supple. No tracheal deviation present. No thyromegaly present.   Cardiovascular: Normal rate.  Exam reveals no gallop and no friction rub.    Murmur heard.  Pulmonary/Chest: She is in respiratory distress. She has wheezes. She has rales (bibasilar).   On nasal cannula   Abdominal: She exhibits no distension. There is no tenderness. There is no rebound and no guarding.   Musculoskeletal: Normal range of motion. She exhibits edema (trace both legs). She exhibits no deformity.   No focal deficits, strength is 5/5 in all 4 extremities.   Neurological: She is alert and oriented to person, place, and time. No cranial nerve deficit. Coordination normal.   Skin: No rash noted. She is not diaphoretic. No erythema. No pallor.   Psychiatric:   Frustrated, depressed           Recent Labs      01/12/18   0353  01/13/18   4930   01/14/18   0118   SODIUM  132*  129*  126*   POTASSIUM  4.4  4.7  4.7   CHLORIDE  95*  95*  92*   CO2  27  25  22   GLUCOSE  146*  124*  158*   BUN  40*  44*  58*   CREATININE  1.18  0.96  1.34   CALCIUM  9.7  9.6  9.8         Recent Labs      01/12/18   0938   BNPBTYPENAT  4973*              Assessment/Plan     * Acute on chronic systolic heart failure (CMS-HCC)   Assessment & Plan    BNP increased to 4973, CXR with edema and b/i pleural effusions despite Lasix therapy, its difficult to get fluid off since kidney don't react well, now evolving NELSON and hyponatremia  > echo with ef 25%  >Coreg 6.25mg--> metoprolol 12.5mg bid due to symptomatic hypotension and fatigue     This is concerning as she is having worsening low output heart failure despite euvolemia and adequate diuresis. Patient remains in guarded condition. Poor prognostic sign.  > palliative discussed with patient in detail about GOC, patient now DNR    - there is very little or no clinical improvement given her severe heart failure and now kidneys not tolerating diurese, along with marginal Bp, will opt for B/L thoracentesis          Goals of care, counseling/discussion   Assessment & Plan    I did have a chance to discuss with patient,30 min face to face about GOC and seriousness of her Heart failure. She does want to talk to Dr. Dillon about her prognosis from cardiac stand point and options in further detail however She  mention she wants to be DNR with her current condition not improving  > palliative care and Dr. Dillon also discussed patients condition from cardiac standpoint with patient and her family.         A-fib (CMS-HCC)   Assessment & Plan    Start xarelto  Will involve SW         Nonsustained ventricular tachycardia (CMS-HCC)   Assessment & Plan    Picked up by tele, patient asymptomatic  Cardiology on board          Acute on chronic respiratory failure (CMS-HCC)   Assessment & Plan    Likely due to CHF with elevated BNP, continue lasix,  b/l thoracentesis today  Discussed with Dr. Dillon        DM type 2 (diabetes mellitus, type 2) (CMS-HCC)- (present on admission)   Assessment & Plan    Diabetic diet    fs with ssi    Cont metformin        Hf; GOC discussed today  Palliative on board  A.fib, on xarelto  BNP worse, CXR worse, lasix IV today BIG  Switch coreg--> metoprolol 12.5mg bid  Palliative on board    INR 3.39, will have to wait for thoracentesis till INR <2. Spoke to the pharmacist who mentioned that xarelto can sometimes falsely elevate INR, she got xarelto last night, will hold tonights dose as she needs B/L thoracentesis. Recheck INR at 1800 and if <2 then maybe IR can do her.     I spent a total of 40 minutes of time during this clinical encounter of which > 50% was devoted to counseling and coordinating care including review of records, pertinent lab data and studies, as well as discussing diagnostic evaluation and work up, planned therapeutic interventions and future disposition of care. Where indicated, the assessment and plan reflect discussion of patient with consultants, other healthcare providers, family members, and additional research needed to obtain further information in formulating the plan of care of this patient. This time includes no procedures or overlap with other providers.            Reece catheter::  No Reece  DVT: xarelto.

## 2018-01-14 NOTE — DISCHARGE PLANNING
Transfer Center:     Received call from MIN Garnica for Dr. Gilberto Dillon regarding transfer request to [facility] for possible heart transplant.    Call placed to [facility] Transfer [facility] @ [phone].  Spoke with Louie on recorded line.  Request for transfer for higher level of care initiated.  Patient information provided.  Referral MD contact information was given for MD to MD report.  Louie requested medical records to be faxed @ [phone].  Facesheet, H&P, Consults, Progress notes, and Therapy notes faxed.  Confirmation received.

## 2018-01-14 NOTE — PROGRESS NOTES
Care of patient assumed after report. Assessment completed. Patient sitting up at side of bed. New peripheral IV started with ultrasound guide. Discussed plan of care with patient. Will continue to monitor.

## 2018-01-14 NOTE — PROGRESS NOTES
Assumed care, PT sitting in chair, C/O right shoulder pain. Repositioned right arm with pillow to support shoulder. Personal items in reach, call light in reach, whiteboard updated.

## 2018-01-15 ENCOUNTER — APPOINTMENT (OUTPATIENT)
Dept: RADIOLOGY | Facility: MEDICAL CENTER | Age: 68
DRG: 291 | End: 2018-01-15
Attending: HOSPITALIST
Payer: MEDICARE

## 2018-01-15 VITALS
SYSTOLIC BLOOD PRESSURE: 96 MMHG | HEART RATE: 72 BPM | BODY MASS INDEX: 29.01 KG/M2 | WEIGHT: 157.63 LBS | TEMPERATURE: 96.7 F | HEIGHT: 62 IN | RESPIRATION RATE: 17 BRPM | DIASTOLIC BLOOD PRESSURE: 64 MMHG | OXYGEN SATURATION: 92 %

## 2018-01-15 LAB
AMYLASE FLD-CCNC: 14 U/L
ANION GAP SERPL CALC-SCNC: 8 MMOL/L (ref 0–11.9)
ANION GAP SERPL CALC-SCNC: 9 MMOL/L (ref 0–11.9)
APPEARANCE FLD: NORMAL
BASOPHILS NFR FLD: 1 %
BODY FLD TYPE: NORMAL
BUN SERPL-MCNC: 73 MG/DL (ref 8–22)
BUN SERPL-MCNC: 76 MG/DL (ref 8–22)
CALCIUM SERPL-MCNC: 9.4 MG/DL (ref 8.5–10.5)
CALCIUM SERPL-MCNC: 9.9 MG/DL (ref 8.5–10.5)
CHLORIDE SERPL-SCNC: 86 MMOL/L (ref 96–112)
CHLORIDE SERPL-SCNC: 91 MMOL/L (ref 96–112)
CO2 SERPL-SCNC: 21 MMOL/L (ref 20–33)
CO2 SERPL-SCNC: 27 MMOL/L (ref 20–33)
COLOR FLD: NORMAL
CREAT SERPL-MCNC: 1.45 MG/DL (ref 0.5–1.4)
CREAT SERPL-MCNC: 1.6 MG/DL (ref 0.5–1.4)
CSF COMMENTS 1658: NORMAL
CYTOLOGY REG CYTOL: NORMAL
EOSINOPHIL NFR FLD: 1 %
GLUCOSE BLD-MCNC: 145 MG/DL (ref 65–99)
GLUCOSE BLD-MCNC: 87 MG/DL (ref 65–99)
GLUCOSE FLD-MCNC: 165 MG/DL
GLUCOSE SERPL-MCNC: 132 MG/DL (ref 65–99)
GLUCOSE SERPL-MCNC: 156 MG/DL (ref 65–99)
HISTIOCYTES NFR FLD: 17 %
INR PPP: 2.12 (ref 0.87–1.13)
LDH FLD L TO P-CCNC: 60 U/L
LYMPHOCYTES NFR FLD: 43 %
MAGNESIUM SERPL-MCNC: 2.2 MG/DL (ref 1.5–2.5)
MESOTHL CELL NFR FLD: 8 %
MONONUC CELLS NFR FLD: 10 %
NEUTROPHILS NFR FLD: 30 %
PH FLD: 8 [PH]
POTASSIUM SERPL-SCNC: 6.2 MMOL/L (ref 3.6–5.5)
POTASSIUM SERPL-SCNC: 6.3 MMOL/L (ref 3.6–5.5)
PROT FLD-MCNC: <3 G/DL
PROTHROMBIN TIME: 23.4 SEC (ref 12–14.6)
RBC # FLD: NORMAL CELLS/UL
SODIUM SERPL-SCNC: 121 MMOL/L (ref 135–145)
SODIUM SERPL-SCNC: 121 MMOL/L (ref 135–145)
WBC # FLD: 449 CELLS/UL

## 2018-01-15 PROCEDURE — 700111 HCHG RX REV CODE 636 W/ 250 OVERRIDE (IP): Performed by: INTERNAL MEDICINE

## 2018-01-15 PROCEDURE — 82945 GLUCOSE OTHER FLUID: CPT

## 2018-01-15 PROCEDURE — 88305 TISSUE EXAM BY PATHOLOGIST: CPT

## 2018-01-15 PROCEDURE — 700102 HCHG RX REV CODE 250 W/ 637 OVERRIDE(OP): Performed by: INTERNAL MEDICINE

## 2018-01-15 PROCEDURE — 700102 HCHG RX REV CODE 250 W/ 637 OVERRIDE(OP): Performed by: HOSPITALIST

## 2018-01-15 PROCEDURE — 82962 GLUCOSE BLOOD TEST: CPT

## 2018-01-15 PROCEDURE — 85610 PROTHROMBIN TIME: CPT

## 2018-01-15 PROCEDURE — A9270 NON-COVERED ITEM OR SERVICE: HCPCS | Performed by: INTERNAL MEDICINE

## 2018-01-15 PROCEDURE — 87116 MYCOBACTERIA CULTURE: CPT

## 2018-01-15 PROCEDURE — 87206 SMEAR FLUORESCENT/ACID STAI: CPT

## 2018-01-15 PROCEDURE — 87102 FUNGUS ISOLATION CULTURE: CPT

## 2018-01-15 PROCEDURE — 80048 BASIC METABOLIC PNL TOTAL CA: CPT

## 2018-01-15 PROCEDURE — 700101 HCHG RX REV CODE 250: Performed by: HOSPITALIST

## 2018-01-15 PROCEDURE — 700111 HCHG RX REV CODE 636 W/ 250 OVERRIDE (IP): Performed by: HOSPITALIST

## 2018-01-15 PROCEDURE — 87015 SPECIMEN INFECT AGNT CONCNTJ: CPT

## 2018-01-15 PROCEDURE — 83735 ASSAY OF MAGNESIUM: CPT

## 2018-01-15 PROCEDURE — 71045 X-RAY EXAM CHEST 1 VIEW: CPT

## 2018-01-15 PROCEDURE — 82150 ASSAY OF AMYLASE: CPT

## 2018-01-15 PROCEDURE — 89051 BODY FLUID CELL COUNT: CPT

## 2018-01-15 PROCEDURE — 700105 HCHG RX REV CODE 258: Performed by: HOSPITALIST

## 2018-01-15 PROCEDURE — 84157 ASSAY OF PROTEIN OTHER: CPT

## 2018-01-15 PROCEDURE — A9270 NON-COVERED ITEM OR SERVICE: HCPCS | Performed by: HOSPITALIST

## 2018-01-15 PROCEDURE — 36415 COLL VENOUS BLD VENIPUNCTURE: CPT

## 2018-01-15 PROCEDURE — 87070 CULTURE OTHR SPECIMN AEROBIC: CPT

## 2018-01-15 PROCEDURE — 83615 LACTATE (LD) (LDH) ENZYME: CPT

## 2018-01-15 PROCEDURE — 0W993ZX DRAINAGE OF RIGHT PLEURAL CAVITY, PERCUTANEOUS APPROACH, DIAGNOSTIC: ICD-10-PCS | Performed by: RADIOLOGY

## 2018-01-15 PROCEDURE — 87205 SMEAR GRAM STAIN: CPT

## 2018-01-15 PROCEDURE — 88112 CYTOPATH CELL ENHANCE TECH: CPT

## 2018-01-15 PROCEDURE — 32555 ASPIRATE PLEURA W/ IMAGING: CPT | Mod: RT

## 2018-01-15 PROCEDURE — 94660 CPAP INITIATION&MGMT: CPT

## 2018-01-15 PROCEDURE — 83986 ASSAY PH BODY FLUID NOS: CPT

## 2018-01-15 RX ORDER — SODIUM POLYSTYRENE SULFONATE 15 G/60ML
30 SUSPENSION ORAL; RECTAL ONCE
Status: COMPLETED | OUTPATIENT
Start: 2018-01-15 | End: 2018-01-15

## 2018-01-15 RX ORDER — FUROSEMIDE 10 MG/ML
20 INJECTION INTRAMUSCULAR; INTRAVENOUS
Status: DISCONTINUED | OUTPATIENT
Start: 2018-01-16 | End: 2018-01-15

## 2018-01-15 RX ORDER — DEXTROSE MONOHYDRATE 25 G/50ML
25 INJECTION, SOLUTION INTRAVENOUS ONCE
Status: COMPLETED | OUTPATIENT
Start: 2018-01-15 | End: 2018-01-15

## 2018-01-15 RX ORDER — METOPROLOL SUCCINATE 25 MG/1
25 TABLET, EXTENDED RELEASE ORAL
Status: DISCONTINUED | OUTPATIENT
Start: 2018-01-15 | End: 2018-01-15 | Stop reason: HOSPADM

## 2018-01-15 RX ORDER — TRAZODONE HYDROCHLORIDE 50 MG/1
50 TABLET ORAL
Qty: 30 TAB | Refills: 3 | Status: SHIPPED | OUTPATIENT
Start: 2018-01-15

## 2018-01-15 RX ORDER — MECLIZINE HCL 12.5 MG/1
12.5 TABLET ORAL 3 TIMES DAILY PRN
Qty: 30 TAB | Refills: 0 | Status: SHIPPED | OUTPATIENT
Start: 2018-01-15

## 2018-01-15 RX ADMIN — ACETAMINOPHEN 650 MG: 325 TABLET, FILM COATED ORAL at 08:57

## 2018-01-15 RX ADMIN — ALLOPURINOL 300 MG: 300 TABLET ORAL at 08:46

## 2018-01-15 RX ADMIN — TIOTROPIUM BROMIDE 1 CAPSULE: 18 CAPSULE ORAL; RESPIRATORY (INHALATION) at 08:47

## 2018-01-15 RX ADMIN — STANDARDIZED SENNA CONCENTRATE AND DOCUSATE SODIUM 2 TABLET: 8.6; 5 TABLET, FILM COATED ORAL at 08:47

## 2018-01-15 RX ADMIN — DEXTROSE MONOHYDRATE 50 ML: 25 INJECTION, SOLUTION INTRAVENOUS at 09:35

## 2018-01-15 RX ADMIN — SODIUM POLYSTYRENE SULFONATE 30 G: 15 SUSPENSION ORAL; RECTAL at 09:04

## 2018-01-15 RX ADMIN — CALCIUM GLUCONATE 1000 MG: 94 INJECTION, SOLUTION INTRAVENOUS at 09:35

## 2018-01-15 RX ADMIN — Medication 500 MG: at 08:46

## 2018-01-15 RX ADMIN — INSULIN HUMAN 10 UNITS: 100 INJECTION, SOLUTION PARENTERAL at 09:35

## 2018-01-15 RX ADMIN — ONDANSETRON 4 MG: 2 INJECTION INTRAMUSCULAR; INTRAVENOUS at 00:00

## 2018-01-15 RX ADMIN — METFORMIN HYDROCHLORIDE 500 MG: 500 TABLET, FILM COATED ORAL at 08:47

## 2018-01-15 RX ADMIN — ONDANSETRON 4 MG: 2 INJECTION INTRAMUSCULAR; INTRAVENOUS at 08:47

## 2018-01-15 RX ADMIN — GABAPENTIN 600 MG: 300 CAPSULE ORAL at 08:49

## 2018-01-15 RX ADMIN — METOPROLOL SUCCINATE 25 MG: 25 TABLET, EXTENDED RELEASE ORAL at 13:50

## 2018-01-15 RX ADMIN — GABAPENTIN 600 MG: 300 CAPSULE ORAL at 13:50

## 2018-01-15 ASSESSMENT — PAIN SCALES - GENERAL
PAINLEVEL_OUTOF10: 3
PAINLEVEL_OUTOF10: 2
PAINLEVEL_OUTOF10: 3
PAINLEVEL_OUTOF10: 5
PAINLEVEL_OUTOF10: 0

## 2018-01-15 NOTE — PROGRESS NOTES
Aurelia Ellis Fall Risk Assessment:     Last Known Fall: No falls  Mobility: Dizziness/generalized weakness  Medications: Diuretics, Cardiovascular or central nervous system meds  Mental Status/LOC/Awareness: Awake, alert, and oriented to date, place, and person  Toileting Needs: No needs  Volume/Electrolyte Status: No problems  Communication/Sensory: No deficits  Behavior: Appropriate behavior  Aurelia Ellis Fall Risk Total: 8  Fall Risk Level: LOW RISK    Universal Fall Precautions:  call light/belongings in reach, bed in low position and locked, wheelchairs and assistive devices out of sight, siderails up x 2, use non-slip footwear, adequate lighting, clutter free and spill free environment, educate on level of risk, educate to call for assistance    Fall Risk Level Interventions:   TRIAL ([facility], NEURO, MED BRUCE 5) Low Fall Risk Interventions  Place yellow fall risk ID band on patient: verified  Provide patient/family education based on risk assessment: completed  Educate patient/family to call staff for assistance when getting out of bed: completed  Place fall precaution signage outside patient door: verifiedTRIAL ([facility], NEURO, MED BRUCE 5) Moderate Fall Risk Interventions  Place yellow fall risk ID band on patient: verified  Provide patient/family education based on risk assessment : completed  Educate patient/family to call staff for assistance when getting out of bed: completed  Place fall precaution signage outside patient door: verified     Patient Specific Interventions:     Medication: review medications with patient and family  Mental Status/LOC/Awareness: reinforce falls education  Toileting: provide frquent toileting  Volume/Electrolyte Status: monitor blood sugars and maintain appropriate blood sugar levels if diabetic  Communication/Sensory: update plan of care on whiteboard  Behavioral: encourage patient to voice feelings  Mobility: ensure bed is locked and in lowest position

## 2018-01-15 NOTE — PROGRESS NOTES
Assumed care, PT sitting in chair, A/O, C/O back pain 5/10, repositioned in chair. Personal items and call bell in reach, whiteboard updated.

## 2018-01-15 NOTE — DISCHARGE PLANNING
Transfer Center:    Received call from [facility] around 2010.  Spoke with Key.  She reported that patient has been accepted by Dr. Heather Ewing.  She had [facility].  She reported that Transfer Agreement and D/C summary must be faxed for room assignment.    Call placed to on-call Hospitalist since D/C summary was not found on chart.  Spoke with Dr. Almanza.  MD reported that she is unable to transcribe the notes required.  D/C summary will have to wait until Attending MD comes in am.    Call placed to cardiologist, Dr. Dillon.  MD reported that he is unable to transcribe the notes at this time.    Call placed to the bedside RN, Salud.  Updated on pt's status.  Asked if she can inform the patient of pending transfer.  She reported that pt had asked to wait until morning so she can see her family.  She was also concern about a thoracentesis procedure that was discussed with her attending MD.    Call placed to daughter, Anastasia Dang.  She reported that she will escorting her mother during the transfer.  Her father will be driving down to the facility.    Call placed to [facility] @ [phone].  Spoke with Key. Updated that D/C summary will be not available until morning.  She requested for the Transfer Agreement form to be filled and faxed tonight.  She reported that we may lose the bed tonight.  Key was informed that a CM will be in at 0700 and will reach out to the Attending MD for the required documents.      Transfer Agreement form signed by required parties.  Faxed to [phone].  Confirmation received.  Copy of chart made and placed outside pt's room: 1 for the accepting facility and another for transporting crew.  Copy of images in disc placed in the facility's chart.  Bedside RN updated.

## 2018-01-15 NOTE — OR SURGEON
Immediate Post- Operative Note        PostOp Diagnosis: ascites      Procedure(s): US guided R thoracentesis      Estimated Blood Loss: Less than 5 ml        Complications: None            1/15/2018     12:06 PM     Gilbert Leo

## 2018-01-15 NOTE — DISCHARGE PLANNING
Medical Social Work    Directions to facility provided to pt's dtr Anastasia.     COBRA packet completed with updated imaging disk    Packet placed in pt's chart

## 2018-01-15 NOTE — DISCHARGE PLANNING
Transfer Center:    Call placed to [facility] @ [phone].  Spoke with Missael.  Reported flight crew will be bedside in 40 minutes with 60 mins flight time.  Pt's daughter, Anastasia Dang will be escorting in-flight.      Father will be driving to facility.  Directions was faxed to unit's SW to give to family.    Call placed to bedside RN for updates.  Pending transfer.

## 2018-01-15 NOTE — CARE PLAN
Problem: Knowledge Deficit  Goal: Knowledge of disease process/condition, treatment plan, diagnostic tests, and medications will improve  Outcome: PROGRESSING AS EXPECTED  Discussed patient's paracentesis for tomorrow.    Problem: Discharge Barriers/Planning  Goal: Patient's continuum of care needs will be met  Outcome: PROGRESSING AS EXPECTED  Discussed PT's transfer to [facility] tomorrow.

## 2018-01-15 NOTE — PALLIATIVE CARE
Palliative Care follow-up  PC RN visited Isatu at bedside to check in and provide support. She was up in the chair with RN and MD at . Discussed plan for transfer to [facility] for further evaluation. She was appreciative of all the help this RN provided. PC RN encouraged Isatu to have her family reach out for any needs or concerns. She was agreeable.      Plan: transfer to [facility] when arranged; discussed with Dr. Keller    Thank you for allowing Palliative Care to participate in this patient's care. Please feel free to call [phone] with any questions or concerns.

## 2018-01-15 NOTE — PROGRESS NOTES
Patient returned from thoracentesis. Sitting on edge of bed. Patient wished to use bathroom, assisted patient as she stated she was feeling slightly dizzy. Educated patient and family to call before getting out of bed if dizziness continues. Patient assessed, band-aid noted to right side back, currently clean dry and intact.

## 2018-01-15 NOTE — PROGRESS NOTES
Cardiology Follow-up Consult Note    Date of note:    1/14/2018      Consulting Physician: Celena Keller M.D.    Patient ID:    Name:   Isatu Dang   YOB: 1950  Age:   67 y.o.  female   MRN:   [id number]      Reason for Consultation: CHF    HPI/Patient ID:    Isatu Dang is a 67 y.o.-year-old female with a history of Stage C left sided systolic heart failure (EF 25%), Medtronic CRT-D, severe functional mitral regurgitation,  hypertension, hypercholesterolemia who presents with SOB     She was recently seen in clinic by Dr. Betancourt on 12/15/17, she is awaiting implantable PA monitor and enrollment in the [employer] HF trial.  After that visit, she left for [place].  She presented on 1/7/2018 with worsening SOB, orthopnea, PND, and LE swelling for 1 week in the setting of possible sodium dietary indescretion. BNP was elevated at 2800.       Monitor review has shown 100% paced beats, multiple PVCs and ectopic beats, and short runs of NSVT (asymptomatic) as well as SVT. She has also been hypotensive, cardiology was consulted for assistance in management.      Currently she is asymptomatic except for abdominal pain.       CRT-D device interrogated and is working well.  It did show multiple episodes of atrial fibrillation for >30 hours back in September.  She was unaware of this event.  She is not on anticoagulation.  She denies history of diabetes, CVA, or PVD.     Interim Events:  # tele reviewed, occasional NSVT, PVCs  # worsening renal function on higher dose lasix, I/O charted likely inaccurate. No weight measured today.   # continued extreme lethargy, lightheadedness and shortness of breath. She has severe insomnia, mildly improved last night  # Patient denies chest pain, palpitations, melena, BRBPR, hematemesis, cough, new numbness, tingling, or focal weakness.   # had family meeting today, discussed possibility of transfer to [facility] for LVAD/advanced therapy consideration.       Medications:  "Reviewed in MAR      Allergies   Allergen Reactions   • Tape Contact Dermatitis   • Ace Inhibitors Cough     cough           Physical Exam  Body mass index is 28.83 kg/m².  Blood pressure (!) 82/58, pulse 98, temperature 36.1 °C (97 °F), resp. rate 20, height 1.575 m (5' 2\"), weight 71.5 kg (157 lb 10.1 oz), last menstrual period 11/05/1994, SpO2 95 %.  Vitals:    01/14/18 0600 01/14/18 0900 01/14/18 1400 01/14/18 1600   BP: 104/64 (!) 92/68 (!) 99/60 (!) 82/58   Pulse: (!) 102 (!) 105 (!) 106 98   Resp: 16 20 (!) 24 20   Temp:  35.8 °C (96.5 °F) 35.9 °C (96.7 °F) 36.1 °C (97 °F)   SpO2: 98% 97% 98% 95%   Weight:       Height:         Oxygen Therapy:  Pulse Oximetry: 95 %, O2 (LPM): 2, O2 Delivery: Silicone Nasal Cannula    General: Anxious, looks ill.   Eyes: nl conjunctiva  ENT: OP clear  Neck: + JVD, no carotid bruits  Lungs: increased, bibasilar crackles  Heart: RRR, 2/6 systolic murmur at apex, no rubs or gallops, trace edema bilateral lower extremities. No LV/RV heave on cardiac palpatation. 2+ bilateral radial pulses.  2+ bilateral dp pulses.   Abdomen: soft, non tender, non distended, no masses, normal bowel sounds.  No HSM.  Extremities/MSK: no clubbing, no cyanosis  Neurological: No focal sensory deficits  Psychiatric: Appropriate affect, A/O x 3  Skin: Warm extremities    Labs (personally reviewed and notable for):   Lab Results   Component Value Date/Time    SODIUM 126 (L) 01/14/2018 01:18 AM    POTASSIUM 4.7 01/14/2018 01:18 AM    CHLORIDE 92 (L) 01/14/2018 01:18 AM    CO2 22 01/14/2018 01:18 AM    GLUCOSE 158 (H) 01/14/2018 01:18 AM    BUN 58 (H) 01/14/2018 01:18 AM    CREATININE 1.34 01/14/2018 01:18 AM    CREATININE 0.7 03/25/2008 10:20 AM      Lab Results   Component Value Date/Time    WBC 10.4 01/11/2018 08:32 AM    RBC 4.96 01/11/2018 08:32 AM    HEMOGLOBIN 15.4 01/11/2018 08:32 AM    HEMATOCRIT 44.6 01/11/2018 08:32 AM    MCV 89.9 01/11/2018 08:32 AM    MCH 31.0 01/11/2018 08:32 AM    MCHC 34.5 " 01/11/2018 08:32 AM    MPV 11.0 01/11/2018 08:32 AM    NEUTSPOLYS 66.20 01/11/2018 08:32 AM    LYMPHOCYTES 18.10 (L) 01/11/2018 08:32 AM    MONOCYTES 6.90 01/11/2018 08:32 AM    EOSINOPHILS 6.60 01/11/2018 08:32 AM    BASOPHILS 1.30 01/11/2018 08:32 AM        BNP 1/7/2018 - 2800  Trop 0.05  Creatinine 0.9      Cardiac Imaging and Procedures Review:    EKG dated 1/7/2018: My personal interpretation is A sensed, v paced at 122     Echo dated 12/18/2017:  Left Ventricle  Left ventricle is moderately dilated. Normal left ventricular wall   thickness. Severely reduced left ventricular systolic function. Left   ventricular ejection fraction is visually estimated to be 25%.   Hypokinesis of the inferior and lateral walls. Contractility is best   preserved in the anterior wall and the septum.    Right Ventricle  Normal right ventricular size and systolic function.    Right Atrium  Normal right atrial size. Dilated inferior vena cava with inspiratory   collapse.    Left Atrium  Severely dilated left atrium. Left atrial volume index is 54 mL/sq m.    Mitral Valve  Severe, functional mitral regurgitation. ERO by PISA method is 0.4 sq   cm. The PISA radius is 0.8 cm.    Aortic Valve  Aortic sclerosis without stenosis. Moderate (borderline severe) aortic   insufficiency. Vena contracta is 0.6 cm.    Tricuspid Valve  Moderate tricuspid regurgitation. Estimated right ventricular systolic   pressure  is 60 mmHg.    Pulmonic Valve  The pulmonic valve is not well visualized. No stenosis or regurgitation   seen.    Pericardium  No pericardial effusion seen.    Aorta  Normal ascending aorta.     [facility] (2011):   FINDINGS:  1.  Sinus rhythm with ventricular pacing and PVCs.  2.  The left ventricular pressure is 106/7 end diastolic.  After left  ventricular cineangiogram, the EDP was 13.  Systemic pressure was low-  normal throughout procedure, typically 98/48.  There was no gradient  across the left ventricular outflow tract.  3.   Fluoroscopy demonstrates endocardial lead in the right ventricle.  Fluoroscopy demonstrates no intracardiac calcification.  4.  Selective coronary arteriograms.  A.  Left main:  This vessel appears normal.  B.  Left anterior descending:  The LAD gives off one diagonal branch as  well as septal perforators and terminates basically at the apex of the  left ventricle.  Slight tortuosity in the midportion of the LAD.  No  flow limiting lesions.  No definitive plaquing.  C.  Ramus intermedius is a very small vessel and appears normal.  D.  Circumflex coronary artery is a nondominant vessel and consists of a  series of obtuse marginal vessels and a vestigial vessel in AV sulcus.  No lesions are noted in this nondominant system.  E.  Right coronary artery is a dominant vessel.  Gives off pulmonary  conus branch and posterior descending coronary artery and posterolateral  branch and AV node artery.  This dominant vessel has no lesions within  it.  5.  Left ventricular cineangiogram:  The left ventricle is enlarged and  globular in shape.  Severe diffuse hypokinesis is noted.  The ejection  fraction is measured at 25% and this agrees with the subjective  appearance of the contractility. The apex is well-visualized.  No  evidence of mural thrombus. No mitral regurgitation.     IMPRESSION  1.  Near-normal coronary arteriograms.  2.  Dilated globular left ventricle with diffuse hypokinesis and  ejection fraction of 25%.  3.  Normal left heart pressures at rest.        Radiology test Review:  CXR 1/12/2018:   Pulmonary edema with bibasilar atelectasis or dependent edema with associated bilateral pleural effusions.     Head Ct:   1.  No evidence of acute intracranial process.    2.  Minimal atrophy.    3.  Old left basal ganglia lacunar infarct.     Impression and Medical Decision Making:  # Acute on chronic left sided systolic heart failure, EF 15%, non-ischemic  # Severe mitral regurgitation  # Moderate aortic regurgitation  #  history of Medtronic CRT-D placement, original RV lead placed 1998  # h/o lung adenocarcinoma, T2N0M0  # Low grade NHL follicular lymphoma, 2012, followed by Dr. Nina Rodríguez, awaiting PET/CT and treatment  # Diabetes   # Paroxysmal atrial fibrillation  # NELSON       Recommendations:  # Decrease diuresis given NELSON  If renal function worsens, will need transfer to unit for dobutamine  # Discussed with Dr. Oc Saucedo from [facility], they will accept her for transfer to evaluate for advanced therapies  # metoprolol 12.5mg PO bid.   # DC'd entresto due to symptomatic hypotension.   # hold rivaroxaban for thora tomorrow if not transferred by then.   # hold spironolactone due to continued symptoms.    Discussed above recommendations with Dr. Keller.     Thank you for allowing me to participate in the care of this patient, I will continue to follow.  Please contact me with any questions.      Gilberto Dillon MD  Cardiologist, [facility] [place]   [phone]

## 2018-01-15 NOTE — DISCHARGE PLANNING
Spoke with SHRUTHI Brito on T7, regarding needing the transfer summary from Dr Magana in order to obtain a room for this patient at [facility].     Awaiting call back from Dr Keller. Page placed.     Chelly, floor RN gave updated report on patient.

## 2018-01-15 NOTE — DISCHARGE INSTRUCTIONS
Discharge Instructions    Discharged to other by medical transportation with relative. Discharged via ambulance, hospital escort: Yes.  Special equipment needed: Oxygen    Be sure to schedule a follow-up appointment with your primary care doctor or any specialists as instructed.     Discharge Plan:   Influenza Vaccine Indication: Not indicated: Previously immunized this influenza season and > 8 years of age    I understand that a diet low in cholesterol, fat, and sodium is recommended for good health. Unless I have been given specific instructions below for another diet, I accept this instruction as my diet prescription.   Other diet: cardiac, fluid restriction    Special Instructions:   HF Patient Discharge Instructions  · Monitor your weight daily, and maintain a weight chart, to track your weight changes.   · Activity as tolerated, unless your Doctor has ordered otherwise. Other activity order: .  · Follow a low fat, low cholesterol, low salt diet unless instructed otherwise by your Doctor. Read the labels on the back of food products and track your intake of fat, cholesterol and salt.   · Fluid Restriction Yes. If a Fluid Restriction has been ordered by your Doctor, measure fluids with a measuring cup to ensure that you are not exceeding the restriction.   · No smoking.  · Oxygen Yes. If your Doctor has ordered that you wear Oxygen at home, it is important to wear it as ordered.  · Did you receive an explanation from staff on the importance of taking each of your medications and why it is necessary to keep taking them unless your doctor says to stop? Yes  · Were all of your questions answered about how to manage your heart failure and what to do if you have increased signs and symptoms after you go home? Yes  · Do you feel like your heart failure care team involved you in the care treatment plan and allowed you to make decisions regarding your care while in the hospital and addressed any discharge needs you  might have? Yes    See the educational handout provided at discharge for more information on monitoring your daily weight, activity and diet. This also explains more about Heart Failure, symptoms of a flare-up and some of the tests that you have undergone.     Warning Signs of a Flare-Up include:  · Swelling in the ankles or lower legs.  · Shortness of breath, while at rest, or while doing normal activities.   · Shortness of breath at night when in bed, or coughing in bed.   · Requiring more pillows to sleep at night, or needing to sit up at night to sleep.  · Feeling weak, dizzy or fatigued.     When to call your Doctor:  · Call [facility] seven days a week from 8:00 a.m. to 8:00 p.m. for medical questions [phone].  · Call your Primary Care Physician or Cardiologist if:   1. You experience any pain radiating to your jaw or neck.  2. You have any difficulty breathing.  3. You experience weight gain of 3 lbs in a day or 5 lbs in a week.   4. You feel any palpitations or irregular heartbeats.  5. You become dizzy or lose consciousness.   If you have had an angiogram or had a pacemaker or AICD placed, and experience:  1. Bleeding, drainage or swelling at the surgical / puncture site.  2. Fever greater than 100.0 F  3. Shock from internal defibrillator.  4. Cool and / or numb extremities.      · Is patient discharged on Warfarin / Coumadin?   No     · Is patient Post Blood Transfusion?  No    Depression / Suicide Risk    As you are discharged from this [facility], it is important to learn how to keep safe from harming yourself.    Recognize the warning signs:  · Abrupt changes in personality, positive or negative- including increase in energy   · Giving away possessions  · Change in eating patterns- significant weight changes-  positive or negative  · Change in sleeping patterns- unable to sleep or sleeping all the time   · Unwillingness or inability to communicate  · Depression  · Unusual  sadness, discouragement and loneliness  · Talk of wanting to die  · Neglect of personal appearance   · Rebelliousness- reckless behavior  · Withdrawal from people/activities they love  · Confusion- inability to concentrate     If you or a loved one observes any of these behaviors or has concerns about self-harm, here's what you can do:  · Talk about it- your feelings and reasons for harming yourself  · Remove any means that you might use to hurt yourself (examples: pills, rope, extension cords, firearm)  · Get professional help from the community (Mental Health, Substance Abuse, psychological counseling)  · Do not be alone:Call your Safe Contact- someone whom you trust who will be there for you.  · Call your local CRISIS HOTLINE 707-4410 or 928-699-4256  · Call your local Children's Mobile Crisis Response Team [place] (819) 473-7084 or www.Zazengo  · Call the toll free National Suicide Prevention Hotlines   · National Suicide Prevention Lifeline 456-399-SHUK (0391)  · National Hope Line Network 800-SUICIDE (419-4776)

## 2018-01-15 NOTE — PROGRESS NOTES
DISCHARGE:    Patient discharged to another facility ([facility]) by air with daughter at bedside. Patient alert and oriented at time of discharge. Copies of discharge instructions sent with air transport and copy for receiving facility as well as patient. Medications will be managed by receiving facility. Patient states having all personal belongings prior to transfer. Toileted prior to transfer. Reports tolerable pain at time of transfer, denies need for medication. IV remains in place 18 gauge in left AC for use in transport and by receiving facility, saline locked.

## 2018-01-15 NOTE — PROGRESS NOTES
PHYSICIAN: DR FLOOD  TECH: SAÚL     PROCEDURE: RT THORACENTESIS     PT CAME TO ULTRASOUND DEPARTMENT FOR HER PROCEDURE OF A RT THORACENTESIS. PT VITALS WERE MONITORED BEFORE, DURING AND AFTER THE PROCEDURE. DURING THE PROCEDURE PT HEART RATE DROPPED AS LOW AS 42 BPM. DR. FLOOD HAD THE PROCEDURE STOPPED AND PT HEARTRATE WENT UP. PT WAS IN A STABLE CONDITION BEFORE, AND AFTER THE PROCEDURE WHEN SHE LEFT BACK TO HER ROOM .  200 ML WAS REMOVED BEFORE THE PROCEDURE WAS STOPPED. 200 ML WAS SENT TO LAB. A XRAY WAS TAKEN IN THE ULTRASOUND ROOM BEFORE PT WENT BACK TO HER ROOM.

## 2018-01-15 NOTE — DISCHARGE PLANNING
Transfer Center:    Received call from Lauren @ [facility].  Reported that patient has room available at this time.     Address:  [place] [address]   Accepting MD:  Dr. Talavera   Room:  310 - Bed-A   Report:  [phone]    Unit's Alisha HAWKINS and bedside RN, Ava updated on status.  Film room [phone] called for copy of new images today.  SHRUTHI will  disc prior to transfer.

## 2018-01-15 NOTE — DISCHARGE PLANNING
DC Summary and Transfer Requirements Checklist faxed to [facility],  [phone]. Confirmation received.

## 2018-01-15 NOTE — PROGRESS NOTES
Cardiology Progress Note               Author: JudsonTaylorWen To Date & Time created: 1/15/2018  1:27 PM     67 years old woman with stage D systolic heart failure. NYHA class 3, came into hospital with heart failure exacerbation. She is s/p BiV ICD. 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    Her cardiac history is as followed:  History of ventricular fibrillation status post AICD implantation in  (2nd prevention as she had V fib arrest at that time) with history of nonischemic cardiomyopathy. The patient was hospitalized in  with congestive heart failure.  The left ventricular systolic function was markedly impaired at that time.  Cardiac catheterization, however, revealed very minimal coronary artery disease at that time.      She is now followed in our office by Dr. Felix Alexis and s/p BiV ICD.      Her last hospitalization for HF exacerbation was in 2017 but has had 3 hospitalizations for HF exacerbation within the last 1.5 years. She was on Entresto for awhile last year but now cannot tolerate the therapy anymore due to low blood pressure.    Interval History:  No acute events overnight.  No telemetry events.    Chief Complaint:  Heart failure exacerbation.    Review of Systems   Unable to perform ROS: Other   Patient is sleeping. I'm not inclined to wake her up as she had troubled sleeping before and now able to sleep.   She is flat in bed however.    Physical Exam  Constitutional:   no acute distress  Eyes: No discharge.  Neck:  No JVD  Cardiovascular: regular rhythm, No murmurs, No rubs, No gallops.   Lungs:  decreased breath sounds on the right.  Abdomen: no distention, no tenderness    Hemodynamics:  Temp (24hrs), Av °C (96.8 °F), Min:35.9 °C (96.6 °F), Max:36.1 °C (97 °F)  Temperature: 36 °C (96.8 °F)  Pulse  Av.5  Min: 58  Max: 120   Blood Pressure : (!) 92/58     Respiratory:    Respiration: 16, Pulse Oximetry: 96 %     Work Of Breathing / Effort: Mild  RUL Breath Sounds:  Diminished, RML Breath Sounds: Diminished, RLL Breath Sounds: Diminished, SHUBHAM Breath Sounds: Diminished, LLL Breath Sounds: Diminished  Fluids:  Date 01/15/18 0700 - 01/16/18 0659   Shift 3116-3807 7320-5388 6288-3150 24 Hour Total   I  N  T  A  K  E   Shift Total       O  U  T  P  U  T   Urine 300   300    Shift Total 300   300   Weight (kg) 71.5 71.5 71.5 71.5          GI/Nutrition:  Orders Placed This Encounter   Procedures   • Diet Order     Standing Status:   Standing     Number of Occurrences:   1     Order Specific Question:   Diet:     Answer:   2 Gram Sodium [7]     Order Specific Question:   Consistency/Fluid modifications:     Answer:   1500 ml Fluid Restriction [9]     Lab Results:  Recent Labs      01/14/18   1308   PLATELETCT  219     Recent Labs      01/14/18   0118  01/15/18   0359  01/15/18   0807   SODIUM  126*  121*  121*   POTASSIUM  4.7  6.3*  6.2*   CHLORIDE  92*  91*  86*   CO2  22  21  27   GLUCOSE  158*  132*  156*   BUN  58*  76*  73*   CREATININE  1.34  1.45*  1.60*   CALCIUM  9.8  9.4  9.9     Recent Labs      01/14/18   1308  01/14/18   1802  01/15/18   0359   INR  3.39*  2.30*  2.12*                     Medical Decision Making, by Problem:  Active Hospital Problems    Diagnosis   • *Acute on chronic systolic heart failure (CMS-HCC) [I50.23]   • DM type 2 (diabetes mellitus, type 2) (CMS-HCC) [E11.9]   • A-fib (CMS-HCC) [I48.91]   • Goals of care, counseling/discussion [Z71.89]   • Nonsustained ventricular tachycardia (CMS-HCC) [I47.2]   • Acute on chronic respiratory failure (CMS-HCC) [J96.20]        Stage D systolic HF.       NYHA class 3.    Plan:    Await transfer to [place] for higher level of care with possible LVAD placement.  Stable now but very low reserves. Her heart failure has progressed to stage D systolic type.  Will stop Lopressor and start Toprol XL 25 mg po daily.  Low threshold to admit to [facility] for inotrope support.  Not on ACE-I or ARB or Spironolactone due to  renal dysfunction.  Potassium is high. Will continue to monitor. Will stop potassium supplements. Will hold diuresis for now.    I spent 35 minutes of critical care time during the consultation and treatment of this patient's complex and critically ill medical issues without overlapping with other physician's care.      Thank you for referring this patient to our cardiology service.  We will follow patient with you.    Devyn Barajas MD.  [facility].    Addendum:    She is now awake and eating lunch. I spent 30 minutes face to face with her daughter and patient discussing about end of life issues. I informed patient and her daughter about the progression of her heart failure which is now stage D. I discussed hospice option. Patient understands and would like to proceed with LVAD option. She signed to be DNR yesterday however.    Devyn Barajas M.D.          [employer]

## 2018-01-15 NOTE — DISCHARGE PLANNING
Spoke with Rain in the Transfer Center at [facility] who stated they received what paper work they needed. No beds available at this time and she was unable to give me an estimated time that they would have a bed. They will call us when a bed is available.

## 2018-01-15 NOTE — CARE PLAN
Problem: Safety  Goal: Will remain free from injury  Outcome: PROGRESSING AS EXPECTED      Problem: Urinary Elimination:  Goal: Ability to reestablish a normal urinary elimination pattern will improve  Outcome: PROGRESSING AS EXPECTED

## 2018-01-15 NOTE — DISCHARGE SUMMARY
CHIEF COMPLAINT ON ADMISSION  Chief Complaint   Patient presents with   • Shortness of Breath     Pt. states increasing shortness of breath since 12/30. Pt. states hx of CHF. Pt's lungs are diminished in the bases. Pt. staes having a productive cough with sob. Pt. states muscle soreness from excessive coughin. Pt's family states that pt. has been more tired than normal lately. Pt. is drowsy in triage by A&O x 4.       CODE STATUS  DNR    HPI & HOSPITAL COURSE  Isatu Dang is a 67 y.o.-year-old female with a history of Stage C left sided systolic heart failure (EF 25%), Medtronic CRT-D, severe functional mitral regurgitation,  hypertension, hypercholesterolemia who presents with SOB. She was recently seen in clinic by Dr. Betancourt on 12/15/17, she is awaiting implantable PA monitor and [facility].  After that visit, she left for [place].  She presented on 1/7/2018 with worsening SOB, orthopnea, PND, and LE swelling for 1 week in the setting of possible sodium dietary indescretion. BNP was elevated at 2800.Echo 1/14/18 showed Global hypokinesis with regional variation. Left ventricular   ejection fraction is visually estimated to be 15% .She was getingt diurese however her she did have low Bp. Monitor review has shown 100% paced beats, multiple PVCs and ectopic beats, and short runs of NSVT (asymptomatic) as well as SVT. She has also been hypotensive, cardiology was consulted for assistance in management. CRT-D device interrogated and is working well.  It did show multiple episodes of atrial fibrillation for >30 hours back in September.  She was unaware of this event.  She is not on anticoagulation.  She denies history of diabetes, CVA, or PVD. She was subsequently started on Xarelto. Her BNP continued to rise because despite diurese, BNP 4973, CXR with edema and b/l pleural effusions. HF medications were adjusted due to her symptoms of fatigue, hypotension, Including Metoprolol, entresto,  and spironolactone. She has been having worsening renal function on higher dose lasix and therefore the dosage was adjust. She was getting Lasix 20mg IV BID to 20mg PO daily throughout depending on her Bp. Family meeting was held on 1/14/18 and patient was DNR, and we initiated transfer to [facility] for LVAD, advance therapy consideration.    She had worsening pleural effusion and renal function and was symptomatic therefore decision was made to get bilateral pleural effusion 1/14/18.However her INR was elevated therefore thoracentesis was held off till her INR normalized. She is still pending bilateral thoracentesis.For her worsening renal function she was given calcium, insulin and albuterol.     Patient remains in guarded condition.      Impression and Medical Decision Making:  # Acute on chronic left sided systolic heart failure, EF 25%, non-ischemic  # Severe mitral regurgitation  # Moderate aortic regurgitation  # history of Medtronic CRT-D placement, original RV lead placed 1998  # h/o lung adenocarcinoma, [id number]  # Low grade NHL follicular lymphoma, 2012, followed by Dr. Nina Rodríguez, awaiting PET/CT and treatment  # Diabetes   # Paroxysmal atrial fibrillation      Cardiac Imaging and Procedures Review:    EKG dated 1/7/2018: My personal interpretation is A sensed, v paced at 122     Echo dated 12/18/2017:  Left Ventricle  Left ventricle is moderately dilated. Normal left ventricular wall   thickness. Severely reduced left ventricular systolic function. Left   ventricular ejection fraction is visually estimated to be 25%.   Hypokinesis of the inferior and lateral walls. Contractility is best   preserved in the anterior wall and the septum.    Right Ventricle  Normal right ventricular size and systolic function.    Right Atrium  Normal right atrial size. Dilated inferior vena cava with inspiratory   collapse.    Left Atrium  Severely dilated left atrium. Left atrial volume index is 54 mL/sq m.    Mitral  Valve  Severe, functional mitral regurgitation. ERO by PISA method is 0.4 sq   cm. The PISA radius is 0.8 cm.    Aortic Valve  Aortic sclerosis without stenosis. Moderate (borderline severe) aortic   insufficiency. Vena contracta is 0.6 cm.    Tricuspid Valve  Moderate tricuspid regurgitation. Estimated right ventricular systolic   pressure  is 60 mmHg.    Pulmonic Valve  The pulmonic valve is not well visualized. No stenosis or regurgitation   seen.    Pericardium  No pericardial effusion seen.    Aorta  Normal ascending aorta.     [facility] (2011):   FINDINGS:  1.  Sinus rhythm with ventricular pacing and PVCs.  2.  The left ventricular pressure is 106/7 end diastolic.  After left  ventricular cineangiogram, the EDP was 13.  Systemic pressure was low-  normal throughout procedure, typically 98/48.  There was no gradient  across the left ventricular outflow tract.  3.  Fluoroscopy demonstrates endocardial lead in the right ventricle.  Fluoroscopy demonstrates no intracardiac calcification.  4.  Selective coronary arteriograms.  A.  Left main:  This vessel appears normal.  B.  Left anterior descending:  The LAD gives off one diagonal branch as  well as septal perforators and terminates basically at the apex of the  left ventricle.  Slight tortuosity in the midportion of the LAD.  No  flow limiting lesions.  No definitive plaquing.  C.  Ramus intermedius is a very small vessel and appears normal.  D.  Circumflex coronary artery is a nondominant vessel and consists of a  series of obtuse marginal vessels and a vestigial vessel in AV sulcus.  No lesions are noted in this nondominant system.  E.  Right coronary artery is a dominant vessel.  Gives off pulmonary  conus branch and posterior descending coronary artery and posterolateral  branch and AV node artery.  This dominant vessel has no lesions within  it.  5.  Left ventricular cineangiogram:  The left ventricle is enlarged and  globular in shape.  Severe diffuse  hypokinesis is noted.  The ejection  fraction is measured at 25% and this agrees with the subjective  appearance of the contractility. The apex is well-visualized.  No  evidence of mural thrombus. No mitral regurgitation.     IMPRESSION  1.  Near-normal coronary arteriograms.  2.  Dilated globular left ventricle with diffuse hypokinesis and  ejection fraction of 25%.  3.  Normal left heart pressures at rest.        Radiology test Review:  CXR:   1.  Slightly improvement of diffuse interstitial edema pattern.    2.  Persistent cardiomegaly, mild interstitial edema, bibasilar atelectasis, and bilateral pleural effusions.      Head Ct:   1.  No evidence of acute intracranial process.    2.  Minimal atrophy.    3.  Old left basal ganglia lacunar infarct.  Therefore, she is discharged in guarded and stable condition with close outpatient follow-up.    SPECIFIC OUTPATIENT FOLLOW-UP  Will need set up at transfer facility    DISCHARGE PROBLEM LIST  Principal Problem:    Acute on chronic systolic heart failure (CMS-HCC) POA: Unknown  Active Problems:    DM type 2 (diabetes mellitus, type 2) (CMS-[facility]) POA: Yes    Acute on chronic respiratory failure (CMS-[facility]) POA: Unknown    Nonsustained ventricular tachycardia (CMS-[facility]) POA: Unknown    A-fib (CMS-[facility]) POA: Unknown    Goals of care, counseling/discussion POA: Unknown  Resolved Problems:    * No resolved hospital problems. *      FOLLOW UP  Future Appointments  Date Time Provider Department Center   1/23/2018 2:00 PM Gregorio Betancourt M.D. [facility] None   3/7/2018 9:00 AM [id number] PULM None   3/7/2018 10:00 AM PULMONARY DX 1 [facility] [address]   3/7/2018 10:20 AM JEAN CLAUDE Coe PULM None     Edwin Urias M.D.  [address]  [address]  [facility] [address]  [phone]    Go on 1/17/2018  PLEASE WALK IN TO SEE YOUR PHYSICIAN AT 9:00AM . THANK YOU      MEDICATIONS ON DISCHARGE   Isatu Dang   Home Medication Instructions LEE:[id number]    Printed on:01/15/18 0749    Medication Information                      albuterol (PROAIR HFA) 108 (90 BASE) MCG/ACT Aero Soln inhalation aerosol  Inhale 2 Puffs by mouth every 6 hours as needed for Shortness of Breath.             allopurinol (ZYLOPRIM) 300 MG TABS  Take 300 mg by mouth every day.             aspirin (ASA) 325 MG TABS  Take 325 mg by mouth every bedtime.             Calcium Carbonate-Vitamin D (CALCIUM 600+D) 600-200 MG-UNIT TABS  Take 2 Tabs by mouth every day.             carvedilol (COREG) 6.25 MG Tab  Take 1 Tab by mouth 2 times a day, with meals.             Cyanocobalamin (VITAMIN B-12 SL)  Place 1 Tab under tongue every day.             gabapentin (NEURONTIN) 300 MG CAPS  Take 600 mg by mouth 3 times a day.             metformin (GLUCOPHAGE) 500 MG Tab  Take 1 Tab by mouth 2 times a day, with meals.             Multiple Vitamin (MULTI-VITAMINS) TABS  Take 1 Tab by mouth every day.             sacubitril-valsartan (ENTRESTO) 24-26 MG Tab tablet  Take 1 Tab by mouth 2 Times a Day.             spironolactone (ALDACTONE) 25 MG Tab  Take 0.5 Tabs by mouth every day.             Tiotropium Bromide Monohydrate (SPIRIVA RESPIMAT) 2.5 MCG/ACT Aero Soln  Inhale 2 Inhalation by mouth every day. Assemble and prime.             torsemide (DEMADEX) 20 MG Tab  Take 2 Tabs by mouth every day.             zolpidem (AMBIEN) 10 MG Tab  TAKE ONE TABLET BY MOUTH AT BEDTIME, as needed prn insomnia                 DIET  Orders Placed This Encounter   Procedures   • Diet Order     Standing Status:   Standing     Number of Occurrences:   1     Order Specific Question:   Diet:     Answer:   2 Gram Sodium [7]     Order Specific Question:   Consistency/Fluid modifications:     Answer:   1500 ml Fluid Restriction [9]       ACTIVITY  As tolerated.  Weight bearing as tolerated      CONSULTATIONS  Cardiology  IR    PROCEDURES  Thoracentesis pending    LABORATORY  Lab Results   Component Value Date/Time    SODIUM 121 (L) 01/15/2018 03:59 AM     POTASSIUM 6.3 (H) 01/15/2018 03:59 AM    CHLORIDE 91 (L) 01/15/2018 03:59 AM    CO2 21 01/15/2018 03:59 AM    GLUCOSE 132 (H) 01/15/2018 03:59 AM    BUN 76 (HH) 01/15/2018 03:59 AM    CREATININE 1.45 (H) 01/15/2018 03:59 AM    CREATININE 0.7 03/25/2008 10:20 AM        Lab Results   Component Value Date/Time    WBC 10.4 01/11/2018 08:32 AM    HEMOGLOBIN 15.4 01/11/2018 08:32 AM    HEMATOCRIT 44.6 01/11/2018 08:32 AM    PLATELETCT 219 01/14/2018 01:08 PM        Total time of the discharge process exceeds 36 minutes

## 2018-01-16 LAB
GRAM STN SPEC: NORMAL
RHODAMINE-AURAMINE STN SPEC: NORMAL
SIGNIFICANT IND 70042: NORMAL
SIGNIFICANT IND 70042: NORMAL
SITE SITE: NORMAL
SITE SITE: NORMAL
SOURCE SOURCE: NORMAL
SOURCE SOURCE: NORMAL

## 2018-01-18 LAB
BACTERIA FLD AEROBE CULT: NORMAL
BACTERIA FLD AEROBE CULT: NORMAL
GRAM STN SPEC: NORMAL
GRAM STN SPEC: NORMAL
SIGNIFICANT IND 70042: NORMAL
SITE SITE: NORMAL
SOURCE SOURCE: NORMAL

## 2018-02-14 LAB
FUNGUS SPEC CULT: NORMAL
FUNGUS SPEC CULT: NORMAL
SIGNIFICANT IND 70042: NORMAL
SITE SITE: NORMAL
SOURCE SOURCE: NORMAL

## 2018-03-13 LAB
MYCOBACTERIUM SPEC CULT: NORMAL
RHODAMINE-AURAMINE STN SPEC: NORMAL
RHODAMINE-AURAMINE STN SPEC: NORMAL
SIGNIFICANT IND 70042: NORMAL
SITE SITE: NORMAL
SOURCE SOURCE: NORMAL

## 2018-04-05 NOTE — PROGRESS NOTES
"Heart Failure New Appointment     Had previous HF New appointment 9/14/17:  6MWT: 369 meters  MLWHF: 28    Today:  6MWT- 271 meters  MLWHF- 55    OP Heart Failure  Vitals  Appointment Type: Heart Failure New  Weight: 68.9 kg (152 lb)  How Weight Obtained: Stand Up Scale  Height: 157.5 cm (5' 2\")  BMI (Calculated): 27.8  Blood Pressure : (!) 96/62  Pulse: 88    System Assessment  NYHA Functional Class Assessment: Class III  ACC/AHA HF Stage: C (stage C-D per MD note)    Smoking Hx  Have you Ever Smoked: Yes  Have you Smoked in the Last 12 Mos: No  Confirm Quit Date: 10/20/98     Alcohol Hx  Do you Drink?: No    Illicit Drug Hx  Illicit Drug History: No    Social Hx  Social History: Lives with Spouse  Level of Support: Good  Advance Directives: Present    Education  Symptoms: Verbalizes understanding  Weighing: Verbalizes Understanding  Weight Gain Response: Verbalizes Understanding   Recording Data: Verbalizes understanding  Teach Back Failures: Teach Back Successful  Compliance: Patient is Compliant     Medications  Medication Reconciliation : Complete  Medication Counseling Provided: Needs Reinforcement  Able to Accurately Identify Medication Indications: Some  Medication Discrepancies: None  Is Patient on an Evidence Based Beta Blocker: Yes  Is Patient on ACE-1 or ARB: Yes (Entresto)    Dietary Assessment  Food Labels: Verbalizes Understanding  Foods High in Sodium: Verbalizes Understanding  Daily Sodium Intake: Verbalizes Understanding  Diet: Verbalizes Understanding  Food Preparation: Verbalizes Understanding  Eating Out Plan: Verbalizes understanding  Healthier Options: Verbalizes Understanding  Fluid Restriction: Not Applicable    MN Living with Heart Failure  Swelling in Ankles or Legs: 2  Having to Sit or Lie Down During the Day: 4  Walking About or Climbing Stairs Difficult: 4  Working Around the House or Yard Difficult: 4  Difficulty Going Away from Home: 2  Difficulty Sleeping Well at Night: 2  Difficulty " Socializing with Family or Friends: 3  Difficulty Working to Earn a Livin  Difficulty with Recreational Pastimes, Sports, Hobbies: 3  Difficulty with Sexual Activities: 0  Eating Less Foods You Like: 0  Making you Short of Breath: 4  Tired, Fatigued or Low on Energy: 4  Making you Stay in a Hospital: 5  Costing you Money for Medical Care?: 2  Giving you Side Effects from Treatments: 2  Feeling like a [place] to Family and Friends: 4  Loss of Self Control in your Life: 3  Making You Worry: 3  Difficulty to Concentrate or Remembering Things: 2  Making you Feel Depressed: 2  MLHF Total Score : 55    6 Minute Walk Test  Baseline to end of test: 6:00  Total meters walked: 271       Education Narrative  Reviewed anatomy and physiology of heart failure with patient and spouse. Went over heart failure worksheet and patient's individual HF diagnosis, EF, risk factors, general medication classes and indications, as well as personal goals.  Goals: Patient's primary goal is to maintain her current state health.    Patient received HF education previously, re-education indicated as MD believes patient to be moving into stage D HF.    Discussed daily weights, sodium restriction, worsening signs and symptoms to report to physician, heart medications, and importance of adherence to medication regimen. Emphasized recommendation from AHA/AAHFN to keep daily sodium intake between 1500mg-2000mg.     Reviewed dietary handouts, advanced care planning classes, and advance directive planning handout. Discussed dietary considerations and reviewed Seven Day Heart Healthy Meal Plan by [employer].    Invited patient and family members/friends to HF support group and encouraged patient to call Heart Failure clinic during normal business hours with any questions.  Heart Failure program card with number given to patient.        Patient and spouse state full understanding of all information given.     Elizabeth HF RN  [phone]

## 2019-09-05 NOTE — ED NOTES
Date: 2019    PATIENT:  Rachael Dao  :          2003  LIANNE:          Sep 5, 2019    Dear Jenna Jo:    I had the pleasure of seeing your patient, Rachael Dao, for a follow-up visit in the [facility] on Sep 5, 2019 at the [facility].  Rachael was last seen in this clinic 1 month ago.  Please see below for my assessment and plan of care.    Intercurrent History:    Rachael was accompanied to this appointment by her mom.  As you may recall, Rachael is a 15 year old girl with depression, anxiety, significant asthma and history of ovarian cysts who presents with class 3 obesity.  The primary contributors to Rachael's weight status include: strong hunger which may be due to a disorder in satiety regulation, mental health barriers (specifically depression, anxiety and PTSD), use of obesogenic medications (such as intermittent steroid use with asthma exacerbations), strong genetic predisposition and intake of calorically dense foods (frequently eating out, drinking SSB).  She is in the process of preparing for bariatric surgery.     Over the past month, her weight increased 4 pounds.  She is disappointed.  She states that she has been experiencing increased anxiety, primarily because school started.  She is now in 12th grade.  She notes that she has not met with her therapist since .  She endorses emotional eating.  Denies binge eating.  Tends to drink lots of liquid calories.    Also since our last visit, she tripped and injured her right foot and is now wearing a boot.     Current Medications:  Current Outpatient Rx   Medication Sig Dispense Refill     budesonide-formoterol (SYMBICORT) 160-4.5 MCG/ACT Inhaler        escitalopram (LEXAPRO) 10 MG tablet Take 10 mg by mouth daily  2     hydrocortisone 2.5 % cream Apply  to affected area 2 times a day as needed for itching or rash (mix with lotrimin cream for rash).        ERP at bedside for exam   "ibuprofen (ADVIL/MOTRIN) 200 MG tablet Take 200-600 mg by mouth       ipratropium - albuterol 0.5 mg/2.5 mg/3 mL (DUONEB) 0.5-2.5 (3) MG/3ML neb solution Inhale 3 mLs into the lungs       JUNEL FE  1.5-30 MG-MCG tablet   3     levalbuterol (XOPENEX) 1.25 MG/3ML neb solution        montelukast (SINGULAIR) 10 MG tablet   0     norethindrone-ethinyl estradiol (MICROGESTIN ) 1.5-30 MG-MCG tablet Take 1 tablet by mouth       ondansetron (ZOFRAN) 4 MG tablet Take 4 mg by mouth       predniSONE (DELTASONE) 10 MG tablet Take 3 tablets by mouth twice daily for 5 days         Physical Exam:    Vitals:    B/P:   BP Readings from Last 1 Encounters:   19 124/75 (87 %/ 83 %)*     *BP percentiles are based on the 2017 AAP Clinical Practice Guideline for girls     BP:  Blood pressure percentiles are 87 % systolic and 83 % diastolic based on the 2017 AAP Clinical Practice Guideline. Blood pressure percentile targets: 90: 126/78, 95: 129/82, 95 + 12 mmH/94. This reading is in the elevated blood pressure range (BP >= 120/80).  P:   Pulse Readings from Last 1 Encounters:   19 91     R: @LASTBRATE(1)@    Measured Weights:  Wt Readings from Last 4 Encounters:   19 135.4 kg (298 lb 6.4 oz) (>99 %)*   19 133.6 kg (294 lb 8.6 oz) (>99 %)*     * Growth percentiles are based on CDC (Girls, 2-20 Years) data.     Height:    Ht Readings from Last 4 Encounters:   19 1.68 m (5' 6.14\") (78 %)*   19 1.655 m (5' 5.16\") (64 %)*     * Growth percentiles are based on CDC (Girls, 2-20 Years) data.       Body Mass Index:  Body mass index is 47.96 kg/m .  Body Mass Index Percentile:  >99 %ile based on CDC (Girls, 2-20 Years) BMI-for-age based on body measurements available as of 2019.    Labs:    Results for orders placed or performed in visit on 19   Lipid Profile   Result Value Ref Range    Cholesterol 208 (H) <170 mg/dL    Triglycerides 150 (H) <90 mg/dL    HDL Cholesterol 56 " >45 mg/dL    LDL Cholesterol Calculated 122 (H) <110 mg/dL    Non HDL Cholesterol 152 (H) <120 mg/dL   Hemoglobin A1c   Result Value Ref Range    Hemoglobin A1C 5.3 0 - 5.6 %   ALT   Result Value Ref Range    ALT 19 0 - 50 U/L   AST   Result Value Ref Range    AST 14 0 - 35 U/L   Vitamin D Deficiency   Result Value Ref Range    Vitamin D Deficiency screening 45 20 - 75 ug/L   Basic metabolic panel   Result Value Ref Range    Sodium 139 133 - 144 mmol/L    Potassium 4.1 3.4 - 5.3 mmol/L    Chloride 108 96 - 110 mmol/L    Carbon Dioxide 25 20 - 32 mmol/L    Anion Gap 6 3 - 14 mmol/L    Glucose 79 70 - 99 mg/dL    Urea Nitrogen 10 7 - 19 mg/dL    Creatinine 0.78 0.50 - 1.00 mg/dL    GFR Estimate GFR not calculated, patient <18 years old. >60 mL/min/[1.73_m2]    GFR Estimate If Black GFR not calculated, patient <18 years old. >60 mL/min/[1.73_m2]    Calcium 9.2 9.1 - 10.3 mg/dL         Assessment:      Rachael is a 15 year old girl with depression, anxiety, significant asthma and history of ovarian cysts who presents with class 3 obesity.  She is in the process of preparing for bariatric surgery.  However, over the past month her weight is up 4 pounds.  She is struggling with emotional eating related to increased anxiety and depression symptoms.  To help support her weight management, we will start bupropion plus naltrexone.  This combination medication, trade named Contrave, is FDA approved for those older than 18.  However, given Rachael's depression symptoms (and ADHD) she may benefit from bupropion.  The naltrexone can help with cravings.      I spent a total of 25 minutes face-to-face with Rachael during today s office visit. Over 50% of this time was spent counseling the patient and/or coordinating care regarding obesity. See note for details.     Rachael s current problem list reviewed today includes:    No diagnosis found.     Care Plan:    Obesity:  Start bupropion  mg daily.  Black box warning reviewed  Start naltrexone  50 mg daily  Meet with RD today  Meet with psychology today    History of pulmonary hypertension:  Obtain cardiac echo at next visit    Depression and anxiety:  Continue Lexapro as previously prescribed  Start bupropion  Instructed family to resume psychotherapy      We are looking forward to seeing Rachael for a follow-up visit in 4 weeks.    Thank you for including me in the care of your patient.  Please do not hesitate to call with questions or concerns.    Sincerely,    Guerita Rahman MD MPH  Diplomate, American Board of Obesity Medicine    Director, Pediatric Weight Management Clinic  Department of Pediatrics  [facility] [phone]  [facility] [phone]  [facility] [phone]  [facility] [place] [phone]            CC  Copy to patient  Feliberto Kimberly   [address]  [address]  [place] [id number]

## 2022-06-22 NOTE — ED NOTES
Pt provided with full meal at this time.    Ear Wedge Repair Text: A wedge excision was completed by carrying down an excision through the full thickness of the ear and cartilage with an inward facing Burow's triangle. The wound was then closed in a layered fashion.

## 2023-09-26 NOTE — PROGRESS NOTES
12 hr cc    Sarecycline Counseling: Patient advised regarding possible photosensitivity and discoloration of the teeth, skin, lips, tongue and gums.  Patient instructed to avoid sunlight, if possible.  When exposed to sunlight, patients should wear protective clothing, sunglasses, and sunscreen.  The patient was instructed to call the office immediately if the following severe adverse effects occur:  hearing changes, easy bruising/bleeding, severe headache, or vision changes.  The patient verbalized understanding of the proper use and possible adverse effects of sarecycline.  All of the patient's questions and concerns were addressed.

## 2024-03-19 NOTE — PROGRESS NOTES
Chief Complaint   Patient presents with   • Apnea     CPAP 11       HPI:  Isatu Dang is a 66 y.o. year old female here today for follow-up on her mild ISMAEL. She is compliant on CPAP at 11 CM H20 with 2 LPM 02 bleed in. Her compliance card download indicates an AHI of 1.6 on a CPAP pressure of 11 CM with an average use of 7-8 hours at night. She tolerates her CPAP pressure well. She uses Ambien at night as needed and feels this helps her to maintain sleep. She denies any morning headaches. She does feel more refreshed on therapy.    She has a history of Follicular Lymphoma and Lung Cancer with a prior lung resection in 2012. She is followed by Oncology, Dr. Rodríguez. She had PFT's while she was in the hospital February 2016 for an exacerbation of her CHF. PFT's indicated evidence of restriction with TLC 75% which was similar to her 2013 PFT's. However, her DLCO was 64% predicted and in 2013 it was 90% predicted.   PFT's were updated 3/3/2017 and indicate an FEV1 of 0.96 L, 46% predicted with an FEV1/FVC ratio of 66 with a TLC of 74% predicted and a DLCO of 89% predicted. Her FEV1 has declined from her February 2016 PFT's in which her FEV1 was 1.32 L, 63% predicted. She was ordered repeat PFT's in 6 months which will be due in September 2017.   She notes dyspnea with exertion. She states she has not been walking as much as she had in the past. She denies any cough or mucous. She denies any wheezing. She denies any fevers or chills. She was placed on a trial of Spiriva respimat 2.5 mcg 2 puffs INH daily at her last office visit. She is using the Spiriva daily and feels it has helped some. She has used the Albuterol inhaler a couple times when she was traveling and more active.   She states she does have portable 02. She states she has required more frequent use of her 02 during the day since she has been more active and traveling more.     CT chest, abdomen and pelvis 8/4/2016;  1.  Focal opacities in the right  Left Voicemail (1st Attempt) for the patient to schedule:    Appointment type: Return Nephrology  Provider: Dr. Harrison  Return date: Approx. 6/18  Phone number: [phone]  Add'l appt(s) needed: Labs at 6 weeks (4/30) and 3 months (6/18)   middle lobe have resolved when compared to previous exam. Small bilateral pleural-based nodules are again appreciated. The nodule in the posterior left lower lobe appears broader and flatter than it did on the previous   examination. This changes of uncertain significance.  2.  Slight increase in size of heterogeneous enhancing anterior superior mediastinal mass with current measurements of 3.2 x 3.6 cm.  3.  Hepatomegaly with fatty infiltration.  4.  Interval resolution of small bilateral pleural effusions.    She continue to follow with Cardiology for her CHF. She has a biventricular AICD.     She had an Echocardigram 6/2/2017 which indicated;  CONCLUSIONS  Prior echo 1-31-16.Normal left ventricular chamber size.  Mild left ventricular hypertrophy.  Left ventricular ejection fraction is visually estimated to be 35%.  Global hypokinesis with regional variation.  Grade IV diastolic dysfunction (restrictive pattern).  Pacer/ICD wire seen in right ventricle.  Pacer/ICD wire seen in right atrium.  Aortic sclerosis without stenosis.  Moderate aortic insufficiency.  Mitral annular calcification.  Moderate tricuspid regurgitation.  Estimated right ventricular systolic pressure  is 65 mmHg.  Right heart pressures are consistent with moderate pulmonary   hypertension.  Mild pulmonic insufficiency.  Normal pericardium without effusion.    Past Medical History   Diagnosis Date   • Joint replacement          • Neuropathy    • Dilated cardiomyopathy    • Hypercholesteremia    • Obesity    • Peripheral neuropathy    • Depression    • Unspecified hemorrhagic conditions      from aspirin   • Renal cyst, acquired, left       History of benign cystic mass on the lower left kidney.   • Hypertension 1998     Pt states bp runs low on current meds   • Myocardial infarct (CMS-[facility]) 1998   • Congestive heart failure (CHF) (CMS-HCC)    • Arrhythmia    • Cardiac arrest (CMS-[facility]) 1998         • LBBB (left bundle branch block)    • Ventricular  tachycardia (CMS-HCC)    • Pain 2011     Legs pain controled on meds   • Indigestion    • Sleep apnea    • Lung cancer (CMS-HCC) 2012         • Cancer (CMS-HCC) 2012     Lymphoma to neck   • CATARACT      Beginning stages no surgery yet   • Diabetes (CMS-HCC)    • Bowel habit changes      Diarrhea.   • Dental disorder      Upper/Lower dentures.       Past Surgical History   Procedure Laterality Date   • Hysterectomy, total abdominal  1995   • Implantable cardioverter defibrillator (icd)  4/1/98, 03/2007, 10/2011   • Carpal tunnel release Right 05/07         • Carpal tunnel release  4/3/08     Performed by DEEP GREEN at [facility]   • Lipoma excision  4/3/08     Performed by DEEP GREEN at [facility]   • Recovery  10/21/2011     Performed by CHARLES, CATH-RECOVERY at [facility] SAME DAY [facility]   • Hysterectomy, total abdominal           • Tonsillectomy and adenoidectomy           • Neck mass excision  5/24/2012     Performed by JUDSON WILLIS at SURGERY SAME DAY [facility]   • Bone marrow aspiration  6/22/2012     Performed by ISAIAS EVERETT at ENDOSCOPY [facility] ORS   • Bone marrow biopsy, ndl/trocar  6/22/2012     Performed by ISAIAS EVERETT at ENDOSCOPY [facility] ORS   • Recovery  7/23/2012     Performed by CHARLES, IR-RECOVERY at SURGERY SAME DAY [facility]   • Thoracoscopy robotic  8/27/2012     Performed by GANSER, JOHN H at [facility]   • Laryngoscopy  11/14/2013     Performed by Judson Willis M.D. at SURGERY SAME DAY [facility]   • Biopsy general  11/14/2013     Performed by Judson Willis M.D. at SURGERY SAME DAY [facility] ORS   • Aicd battery change  November 2016     Generator replacement with Medtronic Viva S CRT-D [id number] implanted by Dr. Alexis. Original device implanted in 1998       Family History   Problem Relation Age of Onset   • Diabetes Mother    • Cancer Mother    • Diabetes Maternal Grandmother    • Diabetes Sister         Social History     Social History   • Marital Status:      Spouse Name: N/A   • Number of Children: N/A   • Years of Education: N/A     Occupational History   • Not on file.     Social History Main Topics   • Smoking status: Former Smoker -- 2.00 packs/day for 30 years     Types: Cigarettes     Quit date: 10/20/1998   • Smokeless tobacco: Never Used   • Alcohol Use: Yes      Comment: occasional    • Drug Use: No   • Sexual Activity: Not Currently     Other Topics Concern   • Not on file     Social History Narrative         ROS:  Constitutional: Denies fevers, chills, sweats, fatigue, weight loss  Eyes: Denies glasses, vision loss, pain, drainage, double vision  Ears/Nose/Mouth/Throat: Denies rhinitis, nasal congestion, ear ache, difficulty hearing, sore throat, persistent hoarseness, decayed teeth/toothache  Cardiovascular: Denies chest pain, tightness, palpitations, swelling in feet/legs, fainting, difficulty breathing when laying down  Respiratory: See HPI   GI: Denies heartburn, difficulty swallowing, nausea, vomiting, abdominal pain, diarrhea, constipation  : Denies frequent urination, painful urination  Integumentary: Denies rashes, lumps or color changes  MSK: Denies painful joints, sore muscles, and back pain.   Neurological: Denies frequent headaches, dizziness, weakness  Sleep: See HPI       Current Outpatient Prescriptions on File Prior to Visit   Medication Sig Dispense Refill   • zolpidem (AMBIEN) 10 MG Tab TAKE ONE TABLET BY MOUTH AT BEDTIME, as needed prn insomnia 90 Tab 1   • albuterol (PROAIR HFA) 108 (90 BASE) MCG/ACT Aero Soln inhalation aerosol Inhale 2 Puffs by mouth every 6 hours as needed for Shortness of Breath. 3 Inhaler 3   • carvedilol (COREG) 25 MG Tab Take 1 Tab by mouth 2 times a day, with meals. 180 Tab 3   • digoxin (LANOXIN) 125 MCG Tab Take 1 Tab by mouth every day. 90 Tab 3   • furosemide (LASIX) 40 MG Tab Take 1 Tab by mouth 2 Times a Day. 180 Tab 3   • losartan  (COZAAR) 50 MG Tab Take 1 Tab by mouth every day. 90 Tab 3   • potassium chloride SA (K-DUR) 10 MEQ Tab CR Take 1 Tab by mouth every day. 90 Tab 3   • Tiotropium Bromide Monohydrate (SPIRIVA RESPIMAT) 2.5 MCG/ACT Aero Soln Inhale 2 Inhalation by mouth every day. Assemble and prime. 3 Inhaler 3   • albuterol (VENTOLIN HFA) 108 (90 BASE) MCG/ACT Aero Soln inhalation aerosol Inhale 2 Puffs by mouth every four hours as needed for Shortness of Breath (Wheezing). 1 Inhaler 6   • Cyanocobalamin (VITAMIN B-12 SL) Place  under tongue every day.     • citalopram (CELEXA) 20 MG Tab TAKE ONE TABLET BY MOUTH ONCE DAILY 90 Tab 3   • metformin (GLUCOPHAGE) 1000 MG tablet Take 1,000 mg by mouth 2 times a day, with meals.     • aspirin (ASA) 325 MG TABS Take 325 mg by mouth every day.     • Multiple Vitamin (MULTI-VITAMINS) TABS Take 1 Tab by mouth every day.     • Omega-3 Fatty Acids (FISH OIL) 300 MG CAPS Take 1 Tab by mouth every day.     • Calcium Carbonate-Vitamin D (CALCIUM 600+D) 600-200 MG-UNIT TABS Take 2 Tabs by mouth every day.     • allopurinol (ZYLOPRIM) 300 MG TABS Take 300 mg by mouth 2 times a day.     • gabapentin (NEURONTIN) 300 MG CAPS Take 600 mg by mouth 3 times a day.       No current facility-administered medications on file prior to visit.     Ace inhibitors and Tape    Blood pressure 110/62, pulse 67, resp. rate 16, height 1.524 m (5'), weight 75.751 kg (167 lb), SpO2 93 %.  PE:   Appearance: Well developed, well nourished, no acute distress  Eyes: PERRL, EOM intact, sclera white, conjunctiva moist  Ears: no lesions or deformities  Hearing: grossly intact  Nose: no lesions or deformities  Oropharynx: tongue normal, posterior pharynx without erythema or exudate  Mallampati Classification: class 4   Neck: supple, trachea midline, no masses   Respiratory effort: no intercostal retractions or use of accessory muscles  Lung auscultation: no rales, rhonchi or wheezes  Heart auscultation: no murmur rub or  gallop  Extremities: no cyanosis or edema  Abdomen: soft ,non tender, no masses  Gait and Station: normal  Digits and nails: no clubbing, cyanosis, petechiae or nodes.  Cranial nerves: grossly intact  Skin: no rashes, lesions or ulcers noted  Orientation: Oriented to time, person and place  Mood and affect: mood and affect appropriate, normal interaction with examiner  Judgement: Intact          Assessment:  1. Obstructive sleep apnea  OVERNIGHT OXIMETRY   2. Nocturnal hypoxemia     3. Mediastinal mass     4. H/O: lung cancer     5. Other type of follicular lymphoma, unspecified body region (CMS-[facility])     6. Restrictive lung disease  AMB PULMONARY FUNCTION TEST/LAB   7. Pulmonary hypertension (CMS-HCC)     8. Combined systolic and diastolic congestive heart failure, unspecified congestive heart failure chronicity (CMS-HCC)     9. Hypoxemia  AMB PULMONARY STRESS TESTING (6 MIN WALK)         Plan:      1) Continue CPAP at 11 CM H20 with 2 LPM 02 bleed in. CNOX now on her current setting to ensure adequate 02 saturations given her pulmonary hypertension noted on Echo.   2) Sleep hygiene discussed. Continue prn Ambien qhs.    3) Continue Spiriva 2.5 mcg respimat 2 puffs INH daily along with an Albuterol HFA inhaler 2 puffs q 4 hours as needed. Encouraged routine walking/exercise. Recommend updated PFT's given decline in FEV1 noted in March.   4) Continue to follow with Cardiology.  5) Continue to follow with Oncology. Request consult records from Dr. Rodríguez including recent CT imaging.    6) Continue 02 2 LPM prn exertion. Obtain 6 minute walk test to evaluate 02 requirements.   7) 1 month follow up at the Pulmonary office with results of her overnight oximetry with updated PFT's and a 6 minute walk test, sooner if needed.
